# Patient Record
Sex: FEMALE | Race: WHITE | NOT HISPANIC OR LATINO | Employment: OTHER | URBAN - METROPOLITAN AREA
[De-identification: names, ages, dates, MRNs, and addresses within clinical notes are randomized per-mention and may not be internally consistent; named-entity substitution may affect disease eponyms.]

---

## 2022-11-07 ENCOUNTER — APPOINTMENT (EMERGENCY)
Dept: RADIOLOGY | Facility: HOSPITAL | Age: 82
End: 2022-11-07

## 2022-11-07 ENCOUNTER — APPOINTMENT (EMERGENCY)
Dept: CT IMAGING | Facility: HOSPITAL | Age: 82
End: 2022-11-07

## 2022-11-07 ENCOUNTER — HOSPITAL ENCOUNTER (INPATIENT)
Facility: HOSPITAL | Age: 82
LOS: 8 days | Discharge: HOME | End: 2022-11-16
Attending: EMERGENCY MEDICINE | Admitting: STUDENT IN AN ORGANIZED HEALTH CARE EDUCATION/TRAINING PROGRAM

## 2022-11-07 DIAGNOSIS — R77.8 ELEVATED TROPONIN: ICD-10-CM

## 2022-11-07 DIAGNOSIS — I50.9 CHF EXACERBATION (HCC): Primary | ICD-10-CM

## 2022-11-07 PROBLEM — R53.1 WEAKNESS: Status: ACTIVE | Noted: 2022-11-07

## 2022-11-07 PROBLEM — I48.91 ATRIAL FIBRILLATION (HCC): Status: ACTIVE | Noted: 2022-11-07

## 2022-11-07 PROBLEM — I10 HYPERTENSION: Status: ACTIVE | Noted: 2022-11-07

## 2022-11-07 PROBLEM — I48.0 PAROXYSMAL A-FIB (HCC): Status: ACTIVE | Noted: 2022-11-07

## 2022-11-07 PROBLEM — R79.89 ELEVATED TROPONIN: Status: ACTIVE | Noted: 2022-11-07

## 2022-11-07 PROBLEM — N18.9 CKD (CHRONIC KIDNEY DISEASE): Status: ACTIVE | Noted: 2022-11-07

## 2022-11-07 PROBLEM — I50.32 CHRONIC DIASTOLIC HEART FAILURE (HCC): Status: ACTIVE | Noted: 2022-11-07

## 2022-11-07 LAB
2HR DELTA HS TROPONIN: 69 NG/L
4HR DELTA HS TROPONIN: 106 NG/L
ALBUMIN SERPL BCP-MCNC: 4.1 G/DL (ref 3.5–5)
ALP SERPL-CCNC: 69 U/L (ref 46–116)
ALT SERPL W P-5'-P-CCNC: 21 U/L (ref 12–78)
ANION GAP SERPL CALCULATED.3IONS-SCNC: 11 MMOL/L (ref 4–13)
AST SERPL W P-5'-P-CCNC: 33 U/L (ref 5–45)
BASOPHILS # BLD AUTO: 0.06 THOUSANDS/ÂΜL (ref 0–0.1)
BASOPHILS NFR BLD AUTO: 1 % (ref 0–1)
BILIRUB SERPL-MCNC: 0.58 MG/DL (ref 0.2–1)
BUN SERPL-MCNC: 26 MG/DL (ref 5–25)
CALCIUM SERPL-MCNC: 9.7 MG/DL (ref 8.3–10.1)
CARDIAC TROPONIN I PNL SERPL HS: 132 NG/L
CARDIAC TROPONIN I PNL SERPL HS: 169 NG/L
CARDIAC TROPONIN I PNL SERPL HS: 63 NG/L
CHLORIDE SERPL-SCNC: 103 MMOL/L (ref 96–108)
CO2 SERPL-SCNC: 28 MMOL/L (ref 21–32)
CREAT SERPL-MCNC: 1.37 MG/DL (ref 0.6–1.3)
CRP SERPL QL: 3.1 MG/L
EOSINOPHIL # BLD AUTO: 0.08 THOUSAND/ÂΜL (ref 0–0.61)
EOSINOPHIL NFR BLD AUTO: 1 % (ref 0–6)
ERYTHROCYTE [DISTWIDTH] IN BLOOD BY AUTOMATED COUNT: 14.3 % (ref 11.6–15.1)
ERYTHROCYTE [SEDIMENTATION RATE] IN BLOOD: 30 MM/HOUR (ref 0–29)
FLUAV RNA RESP QL NAA+PROBE: NEGATIVE
FLUBV RNA RESP QL NAA+PROBE: NEGATIVE
GFR SERPL CREATININE-BSD FRML MDRD: 35 ML/MIN/1.73SQ M
GLUCOSE SERPL-MCNC: 129 MG/DL (ref 65–140)
HCT VFR BLD AUTO: 40.7 % (ref 34.8–46.1)
HGB BLD-MCNC: 13.3 G/DL (ref 11.5–15.4)
IMM GRANULOCYTES # BLD AUTO: 0.04 THOUSAND/UL (ref 0–0.2)
IMM GRANULOCYTES NFR BLD AUTO: 1 % (ref 0–2)
INR PPP: 2.32 (ref 0.84–1.19)
LYMPHOCYTES # BLD AUTO: 1.06 THOUSANDS/ÂΜL (ref 0.6–4.47)
LYMPHOCYTES NFR BLD AUTO: 13 % (ref 14–44)
MAGNESIUM SERPL-MCNC: 2.3 MG/DL (ref 1.6–2.6)
MCH RBC QN AUTO: 29.4 PG (ref 26.8–34.3)
MCHC RBC AUTO-ENTMCNC: 32.7 G/DL (ref 31.4–37.4)
MCV RBC AUTO: 90 FL (ref 82–98)
MONOCYTES # BLD AUTO: 0.46 THOUSAND/ÂΜL (ref 0.17–1.22)
MONOCYTES NFR BLD AUTO: 6 % (ref 4–12)
NEUTROPHILS # BLD AUTO: 6.54 THOUSANDS/ÂΜL (ref 1.85–7.62)
NEUTS SEG NFR BLD AUTO: 78 % (ref 43–75)
NRBC BLD AUTO-RTO: 0 /100 WBCS
NT-PROBNP SERPL-MCNC: 1040 PG/ML
PLATELET # BLD AUTO: 166 THOUSANDS/UL (ref 149–390)
PMV BLD AUTO: 10 FL (ref 8.9–12.7)
POTASSIUM SERPL-SCNC: 3.5 MMOL/L (ref 3.5–5.3)
PROCALCITONIN SERPL-MCNC: 0.06 NG/ML
PROT SERPL-MCNC: 7.8 G/DL (ref 6.4–8.4)
PROTHROMBIN TIME: 24.9 SECONDS (ref 11.6–14.5)
RBC # BLD AUTO: 4.52 MILLION/UL (ref 3.81–5.12)
RSV RNA RESP QL NAA+PROBE: NEGATIVE
SARS-COV-2 RNA RESP QL NAA+PROBE: NEGATIVE
SODIUM SERPL-SCNC: 142 MMOL/L (ref 135–147)
WBC # BLD AUTO: 8.24 THOUSAND/UL (ref 4.31–10.16)

## 2022-11-07 RX ORDER — METOPROLOL SUCCINATE 25 MG/1
25 TABLET, EXTENDED RELEASE ORAL DAILY
Status: DISCONTINUED | OUTPATIENT
Start: 2022-11-07 | End: 2022-11-16 | Stop reason: HOSPADM

## 2022-11-07 RX ORDER — ONDANSETRON 2 MG/ML
4 INJECTION INTRAMUSCULAR; INTRAVENOUS ONCE
Status: COMPLETED | OUTPATIENT
Start: 2022-11-07 | End: 2022-11-07

## 2022-11-07 RX ORDER — WARFARIN SODIUM 2 MG/1
2 TABLET ORAL
Status: DISCONTINUED | OUTPATIENT
Start: 2022-11-07 | End: 2022-11-08

## 2022-11-07 RX ORDER — ONDANSETRON 2 MG/ML
1 INJECTION INTRAMUSCULAR; INTRAVENOUS ONCE
Status: DISCONTINUED | OUTPATIENT
Start: 2022-11-07 | End: 2022-11-12

## 2022-11-07 RX ORDER — FUROSEMIDE 10 MG/ML
40 INJECTION INTRAMUSCULAR; INTRAVENOUS ONCE
Status: COMPLETED | OUTPATIENT
Start: 2022-11-07 | End: 2022-11-07

## 2022-11-07 RX ADMIN — FUROSEMIDE 40 MG: 10 INJECTION, SOLUTION INTRAMUSCULAR; INTRAVENOUS at 18:35

## 2022-11-07 RX ADMIN — METOPROLOL SUCCINATE 25 MG: 25 TABLET, EXTENDED RELEASE ORAL at 21:20

## 2022-11-07 RX ADMIN — IOHEXOL 100 ML: 350 INJECTION, SOLUTION INTRAVENOUS at 16:40

## 2022-11-07 RX ADMIN — ONDANSETRON 4 MG: 2 INJECTION INTRAMUSCULAR; INTRAVENOUS at 16:04

## 2022-11-07 RX ADMIN — WARFARIN SODIUM 2 MG: 2 TABLET ORAL at 22:29

## 2022-11-07 RX ADMIN — SODIUM CHLORIDE 500 ML: 0.9 INJECTION, SOLUTION INTRAVENOUS at 16:05

## 2022-11-07 NOTE — ED PROVIDER NOTES
History  Chief Complaint   Patient presents with   • Dizziness     PT arrived from home VIA EMS  C/o of dizziness, N/V, and weakness  A&O x4  Patient is an 27-year-old female past medical history of hypertension, CHF, hyperlipidemia, CAD, aortic valve replacement on Coumadin presenting for lightheadedness and vomiting  Patient states that 1:00 p m  Roughly 2-1/2 hours ago she began feeling lightheaded, weak and states that at that time she had a left-sided frontal headache which was intermittent and has now wrist fully resolved  She notes that she vomited 5-6 times, bilious, nonbloody and is still feeling nauseous but states that she is not currently feeling lightheaded  She notes sweats today but denies fevers, abdominal pain, chest pain, shortness breath, rashes, vision changes, numbness or tingling, weakness, dysuria  Denies any upper respiratory symptoms or cough  Received fluid on route with some improvement  Notes 2-3 episodes of nonbloody diarrhea and same timeframe  Did not take any medications for her symptoms  None       No past medical history on file  No past surgical history on file  No family history on file  I have reviewed and agree with the history as documented  No existing history information found  No existing history information found  Review of Systems   All other systems reviewed and are negative  Physical Exam  Physical Exam  Vitals reviewed  Constitutional:       General: She is not in acute distress  Appearance: Normal appearance  She is not ill-appearing  HENT:      Mouth/Throat:      Mouth: Mucous membranes are moist    Eyes:      Extraocular Movements: Extraocular movements intact  Conjunctiva/sclera: Conjunctivae normal       Pupils: Pupils are equal, round, and reactive to light  Cardiovascular:      Rate and Rhythm: Normal rate and regular rhythm  Heart sounds: Normal heart sounds     Pulmonary:      Effort: Pulmonary effort is normal       Breath sounds: Normal breath sounds  Abdominal:      General: Abdomen is flat  Palpations: Abdomen is soft  Tenderness: There is no abdominal tenderness  Musculoskeletal:         General: No swelling  Normal range of motion  Cervical back: Neck supple  Skin:     General: Skin is warm and dry  Neurological:      General: No focal deficit present  Mental Status: She is alert  Cranial Nerves: No cranial nerve deficit  Sensory: No sensory deficit  Motor: No weakness        Coordination: Coordination normal       Comments: Became lightheaded with standing and not able to ambulate   Psychiatric:         Mood and Affect: Mood normal          Vital Signs  ED Triage Vitals   Temp Pulse Respirations Blood Pressure SpO2   -- 11/07/22 1515 11/07/22 1515 11/07/22 1515 11/07/22 1515    80 16 165/87 96 %      Temp src Heart Rate Source Patient Position - Orthostatic VS BP Location FiO2 (%)   -- 11/07/22 1515 11/07/22 1515 11/07/22 1515 --    Monitor Sitting Right arm       Pain Score       11/07/22 1520       7           Vitals:    11/07/22 1515   BP: 165/87   Pulse: 80   Patient Position - Orthostatic VS: Sitting         Visual Acuity      ED Medications  Medications   ondansetron (FOR EMS ONLY) (ZOFRAN) 4 mg/2 mL injection 4 mg (has no administration in time range)   furosemide (LASIX) injection 40 mg (has no administration in time range)   sodium chloride 0 9 % bolus 500 mL (0 mL Intravenous Stopped 11/7/22 1705)   ondansetron (ZOFRAN) injection 4 mg (4 mg Intravenous Given 11/7/22 1604)   iohexol (OMNIPAQUE) 350 MG/ML injection (SINGLE-DOSE) 100 mL (100 mL Intravenous Given 11/7/22 1640)       Diagnostic Studies  Results Reviewed     Procedure Component Value Units Date/Time    HS Troponin 0hr (reflex protocol) [[id number]]  (Abnormal) Collected: 11/07/22 1600    Lab Status: Final result Specimen: Blood from Arm, Left Updated: 11/07/22 1638     hs TnI 0hr 63 ng/L HS Troponin I 2hr [[id number]]     Lab Status: No result Specimen: Blood     HS Troponin I 4hr [[id number]]     Lab Status: No result Specimen: Blood     Magnesium [[id number]]  (Normal) Collected: 11/07/22 1600    Lab Status: Final result Specimen: Blood from Arm, Left Updated: 11/07/22 1638     Magnesium 2 3 mg/dL     NT-BNP PRO [[id number]]  (Abnormal) Collected: 11/07/22 1600    Lab Status: Final result Specimen: Blood from Arm, Left Updated: 11/07/22 1638     NT-proBNP 1,040 pg/mL     Comprehensive metabolic panel [[id number]]  (Abnormal) Collected: 11/07/22 1600    Lab Status: Final result Specimen: Blood from Arm, Left Updated: 11/07/22 1630     Sodium 142 mmol/L      Potassium 3 5 mmol/L      Chloride 103 mmol/L      CO2 28 mmol/L      ANION GAP 11 mmol/L      BUN 26 mg/dL      Creatinine 1 37 mg/dL      Glucose 129 mg/dL      Calcium 9 7 mg/dL      AST 33 U/L      ALT 21 U/L      Alkaline Phosphatase 69 U/L      Total Protein 7 8 g/dL      Albumin 4 1 g/dL      Total Bilirubin 0 58 mg/dL      eGFR 35 ml/min/1 73sq m     Narrative:      [facility] guidelines for Chronic Kidney Disease (CKD):   •  Stage 1 with normal or high GFR (GFR > 90 mL/min/1 73 square meters)  •  Stage 2 Mild CKD (GFR = 60-89 mL/min/1 73 square meters)  •  Stage 3A Moderate CKD (GFR = 45-59 mL/min/1 73 square meters)  •  Stage 3B Moderate CKD (GFR = 30-44 mL/min/1 73 square meters)  •  Stage 4 Severe CKD (GFR = 15-29 mL/min/1 73 square meters)  •  Stage 5 End Stage CKD (GFR <15 mL/min/1 73 square meters)  Note: GFR calculation is accurate only with a steady state creatinine    CBC and differential [[id number]]  (Abnormal) Collected: 11/07/22 1600    Lab Status: Final result Specimen: Blood from Arm, Left Updated: 11/07/22 1605     WBC 8 24 Thousand/uL      RBC 4 52 Million/uL      Hemoglobin 13 3 g/dL      Hematocrit 40 7 %      MCV 90 fL      MCH 29 4 pg      MCHC 32 7 g/dL      RDW 14 3 %      MPV 10 0 fL Platelets 698 Thousands/uL      nRBC 0 /100 WBCs      Neutrophils Relative 78 %      Immat GRANS % 1 %      Lymphocytes Relative 13 %      Monocytes Relative 6 %      Eosinophils Relative 1 %      Basophils Relative 1 %      Neutrophils Absolute 6 54 Thousands/µL      Immature Grans Absolute 0 04 Thousand/uL      Lymphocytes Absolute 1 06 Thousands/µL      Monocytes Absolute 0 46 Thousand/µL      Eosinophils Absolute 0 08 Thousand/µL      Basophils Absolute 0 06 Thousands/µL                  XR chest 2 views   ED Interpretation by Gray Beckwith DO (11/07 1656)   NAD      CT abdomen pelvis with contrast   Final Result by Sean Roberts MD (11/07 1711)      1  Liver appears mildly heterogeneous and there is mild periportal edema, nonspecific findings that can be seen with passive congestion  Additionally the liver contour appears slightly lobulated and cannot exclude underlying fibrotic/cirrhotic changes  Suggest correlation with liver function parameters and nonemergent Ultrasound elastography if clinically warranted  2   Endometrium is thickened in this postmenopausal patient measuring approximately 12 mm  Recommend nonemergent pelvic ultrasound  3   Right lower lobe 4 mm nodule  Based on current Fleischner Society 2017 Guidelines on incidental pulmonary nodule, no routine follow-up is needed if the patient is considered low risk for lung cancer  If the patient is considered high risk for lung    cancer, 12 month follow-up non-contrast chest CT is recommended  4   Cardiomegaly with biatrial dilation  The study was marked in EPIC for significant notification  Workstation performed: [id number]         CT head without contrast   Final Result by Carlyle Nunn DO (11/07 1702)      No acute intracranial abnormality  Chronic microangiopathic changes                    Workstation performed: [id number]                    Procedures  ECG 12 Lead Documentation Only    Date/Time: 11/7/2022 5:19 PM  Performed by: Dionna Chu DO  Authorized by: Dionna Chu DO     ECG reviewed by me, the ED Provider: yes    Patient location:  ED  Previous ECG:     Previous ECG:  Unavailable  Interpretation:     Interpretation: abnormal    Rate:     ECG rate assessment: normal    Rhythm:     Rhythm: atrial fibrillation    QRS:     QRS axis:  Normal    QRS intervals:  Normal  Conduction:     Conduction: normal    ST segments:     ST segments:  Normal  T waves:     T waves: non-specific               ED Course  ED Course as of 11/07/22 1833   Mon Nov 07, 2022   1717  patient presents with signs of CHF exacerbation, will give Lasix and admit  SBIRT 20yo+    Flowsheet Row Most Recent Value   SBIRT (23 yo +)    In order to provide better care to our patients, we are screening all of our patients for alcohol and drug use  Would it be okay to ask you these screening questions? Yes Filed at: 11/07/2022 1522   Initial Alcohol Screen: US AUDIT-C     1  How often do you have a drink containing alcohol? 0 Filed at: 11/07/2022 1522   2  How many drinks containing alcohol do you have on a typical day you are drinking? 0 Filed at: 11/07/2022 1522   3b  FEMALE Any Age, or MALE 65+: How often do you have 4 or more drinks on one occassion? 0 Filed at: 11/07/2022 1522   Audit-C Score 0 Filed at: 11/07/2022 1522   MAYUR: How many times in the past year have you    Used an illegal drug or used a prescription medication for non-medical reasons? Never Filed at: 11/07/2022 1522                    MDM  Number of Diagnoses or Management Options  Diagnosis management comments: Patient is an 22-year-old female past medical history of hypertension, hyperlipidemia, CAD, CHF, aortic valve replacement on Coumadin presenting with lightheadedness, vomiting  Patient is well-appearing bedside stable vitals and in no acute distress    She has no abdominal tenderness, no gross abnormalities on neurologic exam with the exception of difficulty ambulating, states she becomes lightheaded weak with standing  Have no concern for posterior stroke as patient denies vertigo, has normal neurologic exam, has associated diarrhea and describes dizziness as lightheadedness and weakness  Will obtain CT head as she notes headache and vomiting, CT abdomen pelvis, cardiac workup give fluids and Zofran and reassess  Disposition  Final diagnoses:   CHF exacerbation ([address] )     Time reflects when diagnosis was documented in both MDM as applicable and the Disposition within this note     Time User Action Codes Description Comment    11/7/2022  5:20 PM Sergio Mckeon Add [I50 9] CHF exacerbation [facility])       ED Disposition     ED Disposition   Admit    Condition   Stable    Date/Time   Mon Nov 7, 2022  5:20 PM    Comment   Case was discussed with Dr Glenford Denver and the patient's admission status was agreed to be Admission Status: observation status to the service of Dr Glenford Denver   Follow-up Information    None         Patient's Medications    No medications on file       No discharge procedures on file      PDMP Review     None          ED Provider  Electronically Signed by           Rita Leblanc DO  11/07/22 7754

## 2022-11-08 LAB
2HR DELTA HS TROPONIN: 67 NG/L
4HR DELTA HS TROPONIN: 75 NG/L
ANION GAP SERPL CALCULATED.3IONS-SCNC: 9 MMOL/L (ref 4–13)
ATRIAL RATE: 100 BPM
BUN SERPL-MCNC: 24 MG/DL (ref 5–25)
CALCIUM SERPL-MCNC: 9.4 MG/DL (ref 8.3–10.1)
CARDIAC TROPONIN I PNL SERPL HS: 350 NG/L
CARDIAC TROPONIN I PNL SERPL HS: 417 NG/L
CARDIAC TROPONIN I PNL SERPL HS: 425 NG/L
CHLORIDE SERPL-SCNC: 101 MMOL/L (ref 96–108)
CO2 SERPL-SCNC: 32 MMOL/L (ref 21–32)
CREAT SERPL-MCNC: 1.6 MG/DL (ref 0.6–1.3)
ERYTHROCYTE [DISTWIDTH] IN BLOOD BY AUTOMATED COUNT: 14.3 % (ref 11.6–15.1)
GFR SERPL CREATININE-BSD FRML MDRD: 29 ML/MIN/1.73SQ M
GLUCOSE SERPL-MCNC: 100 MG/DL (ref 65–140)
HCT VFR BLD AUTO: 40 % (ref 34.8–46.1)
HGB BLD-MCNC: 13.4 G/DL (ref 11.5–15.4)
INR PPP: 2.4 (ref 0.84–1.19)
MAGNESIUM SERPL-MCNC: 2.3 MG/DL (ref 1.6–2.6)
MCH RBC QN AUTO: 29.4 PG (ref 26.8–34.3)
MCHC RBC AUTO-ENTMCNC: 33.5 G/DL (ref 31.4–37.4)
MCV RBC AUTO: 88 FL (ref 82–98)
PLATELET # BLD AUTO: 194 THOUSANDS/UL (ref 149–390)
PMV BLD AUTO: 10 FL (ref 8.9–12.7)
POTASSIUM SERPL-SCNC: 3.8 MMOL/L (ref 3.5–5.3)
PROTHROMBIN TIME: 25.6 SECONDS (ref 11.6–14.5)
QRS AXIS: 55 DEGREES
QRSD INTERVAL: 96 MS
QT INTERVAL: 352 MS
QTC INTERVAL: 442 MS
RBC # BLD AUTO: 4.56 MILLION/UL (ref 3.81–5.12)
SODIUM SERPL-SCNC: 142 MMOL/L (ref 135–147)
T WAVE AXIS: 37 DEGREES
VENTRICULAR RATE: 95 BPM
WBC # BLD AUTO: 7.95 THOUSAND/UL (ref 4.31–10.16)

## 2022-11-08 RX ORDER — ASPIRIN 81 MG/1
81 TABLET ORAL DAILY
Status: DISCONTINUED | OUTPATIENT
Start: 2022-11-08 | End: 2022-11-16 | Stop reason: HOSPADM

## 2022-11-08 RX ORDER — SODIUM CHLORIDE 9 MG/ML
75 INJECTION, SOLUTION INTRAVENOUS CONTINUOUS
Status: DISPENSED | OUTPATIENT
Start: 2022-11-08 | End: 2022-11-08

## 2022-11-08 RX ORDER — WARFARIN SODIUM 1 MG/1
1 TABLET ORAL DAILY
COMMUNITY

## 2022-11-08 RX ORDER — FUROSEMIDE 20 MG/1
20 TABLET ORAL 2 TIMES DAILY
COMMUNITY

## 2022-11-08 RX ORDER — METOPROLOL SUCCINATE 25 MG/1
25 TABLET, EXTENDED RELEASE ORAL DAILY
COMMUNITY

## 2022-11-08 RX ADMIN — ASPIRIN 81 MG: 81 TABLET, COATED ORAL at 15:38

## 2022-11-08 RX ADMIN — METOPROLOL SUCCINATE 25 MG: 25 TABLET, EXTENDED RELEASE ORAL at 08:11

## 2022-11-08 RX ADMIN — SODIUM CHLORIDE 75 ML/HR: 0.9 INJECTION, SOLUTION INTRAVENOUS at 15:38

## 2022-11-08 NOTE — ED NOTES
Repeat Trop elevated, dr Inga Thompson aware     Clide Mail, RN  11/08/22 1713 [facility], RN  11/08/22 1073

## 2022-11-08 NOTE — PROGRESS NOTES
[address]  Progress Note - Chel Tolliver 1940, 80 y o  female MRN: [id number]  Unit/Bed#: -01 Encounter: [id number]  Primary Care Provider: No primary care provider on file  Date and time admitted to hospital: 11/7/2022  2:54 PM    * Weakness  Assessment & Plan  Pt presented to ED after an episode of weakness with associated dizziness and lightheadedness around 1pm followed by 5 episodes nbnb vomiting and diarrhea  CT head: no acute intracranial abnormality  CT A/P: cardiomegaly with biatrial dilation  CXR: no evidence of vascular congestion   Cardiology following  Echo ordered, cardiac catheterization recommended but patient is declining at this time  · IV zofran    Elevated troponin  Assessment & Plan  Likely in setting of intractable nausea and vomiting, continue to trend  tnl 0 hr: 63  tnl 2 hr: 132  · Repeat at 4 hr  · Cardiology following, recommended echo and cardiac cath, patient is declining cardiac catheterization at this time    Chronic diastolic heart failure (HCC)  Assessment & Plan  Wt Readings from Last 3 Encounters:   11/08/22 68 kg (150 lb)     Last echo 1/22: EF>70%,   · Will repeat echo while inpatient  · Monitor I/Os, daily weights  · Monitor electrolytes  · Continue metoprolol, torsemide    Atrial fibrillation (HCC)  Assessment & Plan  · Continue rate control meds  · Coumadin being withheld due to potential cardiac catheterization tomorrow, will re-evaluate tomorrow  · With holding heparin due to increased INR and patient's age    Hypertension  Assessment & Plan  · Continue home BP meds    CKD (chronic kidney disease)  Assessment & Plan  Lab Results   Component Value Date    EGFR 29 11/08/2022    EGFR 35 11/07/2022    CREATININE 1 60 (H) 11/08/2022    CREATININE 1 37 (H) 11/07/2022     Cr at baseline, continue to monitor        VTE Pharmacologic Prophylaxis: VTE Score: 4 Moderate Risk (Score 3-4) - Pharmacological DVT Prophylaxis Contraindicated  Sequential Compression Devices Ordered  Patient Centered Rounds: I performed bedside rounds with nursing staff today  Discussions with Specialists or Other Care Team Provider:  Cardiology    Education and Discussions with Family / Patient: Updated  () at bedside  Time Spent for Care: 30 minutes  More than 50% of total time spent on counseling and coordination of care as described above  Current Length of Stay: 0 day(s)  Current Patient Status: Inpatient   Certification Statement: The patient will continue to require additional inpatient hospital stay due to Cardiology workup for weakness  Discharge Plan: Anticipate discharge in 24-48 hrs to home  Code Status: Level 1 - Full Code    Subjective:   Patient claims to be feeling well  She reports no nausea, vomiting, weakness, near syncopal episodes, lightheadedness, palpitations, chest pain/pressure  Objective:     Vitals:   Temp (24hrs), Av 2 °F (36 8 °C), Min:97 8 °F (36 6 °C), Max:98 5 °F (36 9 °C)    Temp:  [97 8 °F (36 6 °C)-98 5 °F (36 9 °C)] 98 5 °F (36 9 °C)  HR:  [94-99] 94  Resp:  [15-18] 16  BP: ()/(58-85) 129/79  SpO2:  [93 %-96 %] 94 %  Body mass index is 29 29 kg/m²  Input and Output Summary (last 24 hours): Intake/Output Summary (Last 24 hours) at 2022 1649  Last data filed at 2022 1538  Gross per 24 hour   Intake 1460 ml   Output --   Net 1460 ml       Physical Exam:   Physical Exam  Vitals and nursing note reviewed  Constitutional:       Appearance: Normal appearance  HENT:      Head: Normocephalic  Nose: Nose normal       Mouth/Throat:      Mouth: Mucous membranes are moist       Pharynx: Oropharynx is clear  Eyes:      General: No scleral icterus  Conjunctiva/sclera: Conjunctivae normal       Pupils: Pupils are equal, round, and reactive to light  Cardiovascular:      Rate and Rhythm: Normal rate  Rhythm irregular  Pulses: Normal pulses  Heart sounds:  No murmur heard  No friction rub  No gallop  Pulmonary:      Effort: Pulmonary effort is normal  No respiratory distress  Breath sounds: Normal breath sounds  No stridor  No wheezing or rales  Abdominal:      General: Abdomen is flat  Palpations: Abdomen is soft  Musculoskeletal:         General: Normal range of motion  Cervical back: Normal range of motion and neck supple  Right lower leg: No edema  Left lower leg: No edema  Skin:     General: Skin is warm  Findings: No bruising or lesion  Neurological:      General: No focal deficit present  Mental Status: She is alert and oriented to person, place, and time  Mental status is at baseline     Psychiatric:         Mood and Affect: Mood normal           Additional Data:     Labs:  Results from last 7 days   Lab Units 11/08/22  0705 11/07/22  1600   WBC Thousand/uL 7 95 8 24   HEMOGLOBIN g/dL 13 4 13 3   HEMATOCRIT % 40 0 40 7   PLATELETS Thousands/uL 194 166   NEUTROS PCT %  --  78*   LYMPHS PCT %  --  13*   MONOS PCT %  --  6   EOS PCT %  --  1     Results from last 7 days   Lab Units 11/08/22  0705 11/07/22  1600   SODIUM mmol/L 142 142   POTASSIUM mmol/L 3 8 3 5   CHLORIDE mmol/L 101 103   CO2 mmol/L 32 28   BUN mg/dL 24 26*   CREATININE mg/dL 1 60* 1 37*   ANION GAP mmol/L 9 11   CALCIUM mg/dL 9 4 9 7   ALBUMIN g/dL  --  4 1   TOTAL BILIRUBIN mg/dL  --  0 58   ALK PHOS U/L  --  69   ALT U/L  --  21   AST U/L  --  33   GLUCOSE RANDOM mg/dL 100 129     Results from last 7 days   Lab Units 11/08/22  0713   INR  2 40*             Results from last 7 days   Lab Units 11/07/22  2241   PROCALCITONIN ng/ml 0 06       Lines/Drains:  Invasive Devices  Report    Peripheral Intravenous Line  Duration           Peripheral IV 11/07/22 Left Antecubital 1 day                      Imaging: Reviewed radiology reports from this admission including: chest xray, abdominal/pelvic CT and CT head    Recent Cultures (last 7 days):         Last 24 Hours Medication List:   Current Facility-Administered Medications   Medication Dose Route Frequency Provider Last Rate   • aspirin  81 mg Oral Daily Sneha Melara MD     • metoprolol succinate  25 mg Oral Daily Li Hogan PA-C     • ondansetron (FOR EMS ONLY)  1 each Does not apply Once Triage Protocol Emergency, MD     • sodium chloride  75 mL/hr Intravenous Continuous Sneha Melara MD 75 mL/hr (11/08/22 5847)        Today, Patient Was Seen By: Jane Ramirez    **Please Note: This note may have been constructed using a voice recognition system  ** Trilobed Flap Text: The defect edges were debeveled with a #15 scalpel blade.  Given the location of the defect and the proximity to free margins a trilobed flap was deemed most appropriate.  Using a sterile surgical marker, an appropriate trilobed flap drawn around the defect.    The area thus outlined was incised deep to adipose tissue with a #15 scalpel blade.  The skin margins were undermined to an appropriate distance in all directions utilizing iris scissors.

## 2022-11-08 NOTE — ASSESSMENT & PLAN NOTE
Pt presented to ED after an episode of weakness with associated dizziness and lightheadedness around 1pm followed by 5 episodes nbnb vomiting and diarrhea  CT head: no acute intracranial abnormality  CT A/P: cardiomegaly with biatrial dilation  CXR: no evidence of vascular congestion   Cardiology following    Echo ordered, cardiac catheterization recommended but patient is declining at this time  · IV zofran

## 2022-11-08 NOTE — ASSESSMENT & PLAN NOTE
Lab Results   Component Value Date    EGFR 35 11/07/2022    CREATININE 1 37 (H) 11/07/2022     Cr at baseline, continue to monitor

## 2022-11-08 NOTE — ASSESSMENT & PLAN NOTE
Wt Readings from Last 3 Encounters:   No data found for Wt     Last echo 1/22: EF>70%,   · Monitor I/Os, daily weights  · Monitor electrolytes  · Continue metoprolol, torsemide

## 2022-11-08 NOTE — ASSESSMENT & PLAN NOTE
Lab Results   Component Value Date    EGFR 29 11/08/2022    EGFR 35 11/07/2022    CREATININE 1 60 (H) 11/08/2022    CREATININE 1 37 (H) 11/07/2022     Cr at baseline, continue to monitor

## 2022-11-08 NOTE — PLAN OF CARE
Problem: MOBILITY - ADULT  Goal: Maintain or return to baseline ADL function  Description: INTERVENTIONS:  -  Assess patient's ability to carry out ADLs; assess patient's baseline for ADL function and identify physical deficits which impact ability to perform ADLs (bathing, care of mouth/teeth, toileting, grooming, dressing, etc )  - Assess/evaluate cause of self-care deficits   - Assess range of motion  - Assess patient's mobility; develop plan if impaired  - Assess patient's need for assistive devices and provide as appropriate  - Encourage maximum independence but intervene and supervise when necessary  - Involve family in performance of ADLs  - Assess for home care needs following discharge   - Consider OT consult to assist with ADL evaluation and planning for discharge  - Provide patient education as appropriate  11/8/2022 1520 by Tsering Womack  Outcome: Progressing  11/8/2022 1516 by Tsering Woamck  Outcome: Progressing  Goal: Maintains/Returns to pre admission functional level  Description: INTERVENTIONS:  - Perform BMAT or MOVE assessment daily    - Set and communicate daily mobility goal to care team and patient/family/caregiver  - Collaborate with rehabilitation services on mobility goals if consulted  - Perform Range of Motion 3 times a day  - Reposition patient every 3 hours    - Dangle patient 3 times a day  - Stand patient 3 times a day  - Ambulate patient 3 times a day  - Out of bed to chair 3 times a day   - Out of bed for meals 3 times a day  - Out of bed for toileting  - Record patient progress and toleration of activity level   11/8/2022 1520 by Tsering Womack  Outcome: Progressing  11/8/2022 1516 by Tsering Womack  Outcome: Progressing     Problem: Potential for Falls  Goal: Patient will remain free of falls  Description: INTERVENTIONS:  - Educate patient/family on patient safety including physical limitations  - Instruct patient to call for assistance with activity   - Consult OT/PT to assist with strengthening/mobility   - Keep Call bell within reach  - Keep bed low and locked with side rails adjusted as appropriate  - Keep care items and personal belongings within reach  - Initiate and maintain comfort rounds  - Make Fall Risk Sign visible to staff  - Offer Toileting every 3 Hours, in advance of need  - Initiate/Maintain bed alarm  - Obtain necessary fall risk management equipment:  - Apply yellow socks and bracelet for high fall risk patients  - Consider moving patient to room near nurses station  11/8/2022 1520 by Roma Redmond  Outcome: Progressing  11/8/2022 1516 by Roma Redmond  Outcome: Progressing     Problem: PAIN - ADULT  Goal: Verbalizes/displays adequate comfort level or baseline comfort level  Description: Interventions:  - Encourage patient to monitor pain and request assistance  - Assess pain using appropriate pain scale  - Administer analgesics based on type and severity of pain and evaluate response  - Implement non-pharmacological measures as appropriate and evaluate response  - Consider cultural and social influences on pain and pain management  - Notify physician/advanced practitioner if interventions unsuccessful or patient reports new pain  Outcome: Progressing     Problem: INFECTION - ADULT  Goal: Absence or prevention of progression during hospitalization  Description: INTERVENTIONS:  - Assess and monitor for signs and symptoms of infection  - Monitor lab/diagnostic results  - Monitor all insertion sites, i e  indwelling lines, tubes, and drains  - Monitor endotracheal if appropriate and nasal secretions for changes in amount and color  - Lowgap appropriate cooling/warming therapies per order  - Administer medications as ordered  - Instruct and encourage patient and family to use good hand hygiene technique  - Identify and instruct in appropriate isolation precautions for identified infection/condition  Outcome: Progressing  Goal: Absence of fever/infection during neutropenic period  Description: INTERVENTIONS:  - Monitor WBC    Outcome: Progressing     Problem: SAFETY ADULT  Goal: Maintain or return to baseline ADL function  Description: INTERVENTIONS:  -  Assess patient's ability to carry out ADLs; assess patient's baseline for ADL function and identify physical deficits which impact ability to perform ADLs (bathing, care of mouth/teeth, toileting, grooming, dressing, etc )  - Assess/evaluate cause of self-care deficits   - Assess range of motion  - Assess patient's mobility; develop plan if impaired  - Assess patient's need for assistive devices and provide as appropriate  - Encourage maximum independence but intervene and supervise when necessary  - Involve family in performance of ADLs  - Assess for home care needs following discharge   - Consider OT consult to assist with ADL evaluation and planning for discharge  - Provide patient education as appropriate  11/8/2022 1520 by Jes Bell  Outcome: Progressing  11/8/2022 1516 by Jes Bell  Outcome: Progressing  Goal: Maintains/Returns to pre admission functional level  Description: INTERVENTIONS:  - Perform BMAT or MOVE assessment daily    - Set and communicate daily mobility goal to care team and patient/family/caregiver  - Collaborate with rehabilitation services on mobility goals if consulted  - Perform Range of Motion 3 times a day  - Reposition patient every 3 hours    - Dangle patient 3 times a day  - Stand patient 3 times a day  - Ambulate patient 3 times a day  - Out of bed to chair 3 times a day   - Out of bed for meals 3 times a day  - Out of bed for toileting  - Record patient progress and toleration of activity level   11/8/2022 1520 by Jes Bell  Outcome: Progressing  11/8/2022 1516 by Jes Bell  Outcome: Progressing  Goal: Patient will remain free of falls  Description: INTERVENTIONS:  - Educate patient/family on patient safety including physical limitations  - Instruct patient to call for assistance with activity   - Consult OT/PT to assist with strengthening/mobility   - Keep Call bell within reach  - Keep bed low and locked with side rails adjusted as appropriate  - Keep care items and personal belongings within reach  - Initiate and maintain comfort rounds  - Make Fall Risk Sign visible to staff  - Offer Toileting every 3 Hours, in advance of need  - Initiate/Maintain bed alarm  - Obtain necessary fall risk management equipment:   - Apply yellow socks and bracelet for high fall risk patients  - Consider moving patient to room near nurses station  11/8/2022 1520 by Kylee Poag  Outcome: Progressing  11/8/2022 1516 by Kylee Poag  Outcome: Progressing     Problem: DISCHARGE PLANNING  Goal: Discharge to home or other facility with appropriate resources  Description: INTERVENTIONS:  - Identify barriers to discharge w/patient and caregiver  - Arrange for needed discharge resources and transportation as appropriate  - Identify discharge learning needs (meds, wound care, etc )  - Arrange for interpretive services to assist at discharge as needed  - Refer to Case Management Department for coordinating discharge planning if the patient needs post-hospital services based on physician/advanced practitioner order or complex needs related to functional status, cognitive ability, or social support system  Outcome: Progressing     Problem: Knowledge Deficit  Goal: Patient/family/caregiver demonstrates understanding of disease process, treatment plan, medications, and discharge instructions  Description: Complete learning assessment and assess knowledge base    Interventions:  - Provide teaching at level of understanding  - Provide teaching via preferred learning methods  Outcome: Progressing

## 2022-11-08 NOTE — CONSULTS
Reason for Consult / Principal Problem:Elevated troponin    Physician Requesting Consult:  Ann-Marie Sims MD    Cardiologist: Vivi        Assessment and Plan      Current Problem List   Principal Problem:    Weakness  Active Problems:    Atrial fibrillation ([address] )    Hypertension    Chronic diastolic heart failure (HCC)    Elevated troponin    CKD (chronic kidney disease)    Assessment/Plan:    1  Elevated Troponin  · Trop 63 ->132 -> 169 ->350  · Past hx of CAD and cath in 2021 that per care everywhere was negative, but do not have access to full records  · BNP 1040  · Due to history, abnormal EKG findings, and increase in troponins-cardiac catheterization recommended   · Patient declined cardiac catheterization currently and states she would like to get a second opinion with her other cardiologist  Reviewed risks including MI and even mortality, patient aware  Will watch for next 24 hours and reassess   2  Atrial Fibrillation  · Chronic A fib on Coumadin  · EKG shows A fib  3  Aortic Valve Replacement  · Patient has history of TAVR (valve in valve) in 2021  · [facility]  · On coumadin        Subjective     CC: Shortness of Breath    HPI: Marlen [facility] 80y o  year old female PMH Afib, HTN, CHF, HLD, CAD, TAVR who presents with shortness of breath and dizziness  Patient states she suddenly felt dizzy and vomited multiple times  Patient was found to have elevated troponin  EKG: A fib, nonspecific ST and T wave abnormalities    ECHO: pending    Cardiac Catheterization: 11/2021 cath was negative    CHEST X-RAY: negative    Family History: non-contributory  Historical Information   History reviewed  No pertinent past medical history  History reviewed  No pertinent surgical history    Social History   Social History     Substance and Sexual Activity   Alcohol Use None     Social History     Substance and Sexual Activity   Drug Use Not on file     Social History     Tobacco Use   Smoking Status Never Smoker   Smokeless Tobacco Never Used     Family History: History reviewed  No pertinent family history  Review of Systems:  Review of Systems   Constitutional: Positive for fatigue  Respiratory: Positive for shortness of breath  Cardiovascular: Negative for chest pain, palpitations and leg swelling  Gastrointestinal: Negative for abdominal pain  Genitourinary: Negative for dysuria  Scheduled Meds:  Current Facility-Administered Medications   Medication Dose Route Frequency Provider Last Rate   • metoprolol succinate  25 mg Oral Daily Oralia Contreras PA-C     • ondansetron (FOR EMS ONLY)  1 each Does not apply Once Triage Protocol Emergency, MD     • warfarin  2 mg Oral Daily (warfarin) Lavelle Izaguirre MD       Continuous Infusions:   PRN Meds:   all current active meds have been reviewed    No Known Allergies    Objective   Vitals: Temp (24hrs), Av 9 °F (36 6 °C), Min:97 8 °F (36 6 °C), Max:97 9 °F (36 6 °C)  Current: Temperature: 97 8 °F (36 6 °C)  Patient Vitals for the past 24 hrs:   BP Temp Temp src Pulse Resp SpO2   22 0956 96/58 -- -- 99 16 95 %   22 0811 129/80 -- -- 97 18 94 %   22 0723 124/81 97 8 °F (36 6 °C) Oral 95 16 95 %   22 0559 133/84 -- -- 95 15 96 %   22 2345 131/78 97 9 °F (36 6 °C) Oral 95 18 93 %   22 2019 153/85 -- -- 95 16 96 %   22 1515 165/87 -- -- 80 16 96 %    There is no height or weight on file to calculate BMI  Orthostatic Blood Pressures    Flowsheet Row Most Recent Value   Blood Pressure 96/58 filed at 2022 2512   Patient Position - Orthostatic VS Sitting filed at 2022 0956              Invasive Devices  Report    Peripheral Intravenous Line  Duration           Peripheral IV 22 Left Antecubital <1 day                Physical Exam:  Physical Exam  Vitals and nursing note reviewed  Constitutional:       General: She is not in acute distress       Appearance: She is well-developed  HENT:      Head: Normocephalic and atraumatic  Eyes:      Conjunctiva/sclera: Conjunctivae normal    Cardiovascular:      Rate and Rhythm: Normal rate and regular rhythm  Heart sounds: No murmur heard  Pulmonary:      Effort: Pulmonary effort is normal  No respiratory distress  Breath sounds: Normal breath sounds  Abdominal:      Palpations: Abdomen is soft  Tenderness: There is no abdominal tenderness  Musculoskeletal:      Cervical back: Neck supple  Right lower leg: No edema  Left lower leg: No edema  Skin:     General: Skin is warm and dry  Neurological:      Mental Status: She is alert and oriented to person, place, and time               Lab Results:   Results from last 7 days   Lab Units 11/08/22  0705 11/07/22  1600   WBC Thousand/uL 7 95 8 24   HEMOGLOBIN g/dL 13 4 13 3   HEMATOCRIT % 40 0 40 7   PLATELETS Thousands/uL 194 166   NEUTROS PCT %  --  78*   MONOS PCT %  --  6      Results from last 7 days   Lab Units 11/08/22  0713 11/08/22 0705 11/07/22 2112 11/07/22  1600   SODIUM mmol/L  --  142  --  142   POTASSIUM mmol/L  --  3 8  --  3 5   CHLORIDE mmol/L  --  101  --  103   CO2 mmol/L  --  32  --  28   BUN mg/dL  --  24  --  26*   CREATININE mg/dL  --  1 60*  --  1 37*   CALCIUM mg/dL  --  9 4  --  9 7   ALK PHOS U/L  --   --   --  69   ALT U/L  --   --   --  21   AST U/L  --   --   --  33   SED RATE mm/hour  --   --  30*  --    CRP mg/L  --   --   --  3 1*   MAGNESIUM mg/dL  --  2 3  --  2 3   INR  2 40*  --  2 32*  --    EGFR ml/min/1 73sq m  --  29  --  35     Results from last 7 days   Lab Units 11/08/22  0713 11/07/22 2112   INR  2 40* 2 32*             No results found for: PHART, XWW3LGU, PO2ART, NPW3VGQ, W4FBETBA, BEART, SOURCE  No components found for: HIV1X2  No results found for: HAV, HEPAIGM, HEPBIGM, HEPBCAB, HBEAG, HEPCAB  No results found for: SPEP, UPEP No results found for: HGBA1C  No results found for: CHOL No results found for: HDL No results found for: LDLCALC No results found for: TRIG  No components found for: PROCAL          Imaging: I have personally reviewed pertinent reports          [facility],[address]  Internal Medicine Residency PGY-1

## 2022-11-08 NOTE — CASE MANAGEMENT
Case Management Assessment & Discharge Planning Note    Patient name Gayle Lares  Location [address] [id number] MRN [id number]  : 1940 Date 2022       Current Admission Date: 2022  Current Admission Diagnosis:Weakness   Patient Active Problem List    Diagnosis Date Noted   • Atrial fibrillation ([facility] [address] ) 2022   • Hypertension 2022   • Weakness 2022   • Chronic diastolic heart failure ([facility] [address] ) 2022   • Elevated troponin 2022   • CKD (chronic kidney disease) 2022      LOS (days): 0  Geometric Mean LOS (GMLOS) (days):   Days to GMLOS:     OBJECTIVE:              Current admission status: Observation       Preferred Pharmacy:   [facility] [address]  [place] [address]  Phone: [phone] Fax: [phone]    Primary Care Provider: No primary care provider on file  Primary Insurance: MEDICARE  Secondary Insurance: [address],[facility] E SHIELD    ASSESSMENT:  Active Health Care Proxies    There are no active Health Care Proxies on file  Advance Directives  Does patient have a [address]?: Yes (Patient stated that everything has been done with an )  Does patient have Advance Directives?: Yes  Advance Directives: Living will, Power of  for health care  Primary Contact:  светлана "LYSSA' Borek         Readmission Root Cause  30 Day Readmission: No    Patient Information  Admitted from[de-identified] Home  Mental Status: Alert  During Assessment patient was accompanied by: Spouse  Assessment information provided by[de-identified] Patient, Spouse  Primary Caregiver: Self  Support Systems: Self, Spouse/significant other, [address] of Residence: [place] [address] do you live in?: [address] [facility] entry access options   Select all that apply : No steps to enter home  Type of Current Residence: 2 story home  Upon entering residence, is there a bedroom on the main floor (no further steps)?: Yes (Patient stated there is no need to go upstairs to loft )  Upon entering residence, is there a bathroom on the main floor (no further steps)?: Yes  In the last 12 months, was there a time when you were not able to pay the mortgage or rent on time?: No  In the last 12 months, how many places have you lived?: 1  In the last 12 months, was there a time when you did not have a steady place to sleep or slept in a shelter (including now)?: No  Homeless/housing insecurity resource given?: N/A  Living Arrangements: Lives w/ Spouse/significant other  Is patient a ?: No    Activities of Daily Living Prior to Admission  Functional Status: Independent  Completes ADLs independently?: Yes  Ambulates independently?: Yes  Does patient use assisted devices?: Yes  Assisted Devices (DME) used: [employer]  Does patient currently own DME?: Yes  What DME does the patient currently own?: [employer]  Does patient have a history of Outpatient Therapy (PT/OT)?: No  Does the patient have a history of Short-Term Rehab?: No  Does patient have a history of HHC?: Yes (Patient stated a VNA came to home  Patient could not remember name of agency used   Patient also stated she did not want [address] )  Does patient currently have [address]?: No         Patient Information Continued  Income Source: Pension/alf  Does patient have prescription coverage?: Yes  Within the past 12 months, you worried that your food would run out before you got the money to buy more : Never true  Within the past 12 months, the food you bought just didn't last and you didn't have money to get more : Never true  Food insecurity resource given?: N/A  Does patient receive dialysis treatments?: No  Does patient have a history of substance abuse?: No  Does patient have a history of Mental Health Diagnosis?: No    PHQ 2/9 Screening   Reviewed PHQ 2/9 Depression Screening Score?: No    Means of Transportation  Means of Transport to Appts[de-identified] Drives Self  In the past 12 months, has lack of transportation kept you from medical appointments or from getting medications?: No  In the past 12 months, has lack of transportation kept you from meetings, work, or from getting things needed for daily living?: No  Was application for public transport provided?: N/A        DISCHARGE DETAILS:    Discharge planning discussed with[de-identified] Patient discussed discharge with patient at bedside with   Freedom of Choice: Yes  Comments - Freedom of Choice: CM discussed freedom of choice with patient at bedside along with  as it pertains to discharge planning  CM contacted family/caregiver?: Yes ( was at bedside )  Were Treatment Team discharge recommendations reviewed with patient/caregiver?: Yes  Did patient/caregiver verbalize understanding of patient care needs?: Yes  Were patient/caregiver advised of the risks associated with not following Treatment Team discharge recommendations?: Yes         Requested [address]         Is the patient interested in [place] [address] at discharge?: No    DME Referral Provided  Referral made for DME?: No    Other Referral/Resources/Interventions Provided:  Interventions: None Indicated  Referral Comments: Patient declined needing any services  Patient stated they plan on leaving for a long vacation in january so they did not want VNA services      Would you like to participate in our [address] service program?  : No - Declined

## 2022-11-08 NOTE — ASSESSMENT & PLAN NOTE
Likely in setting of intractable nausea and vomiting, continue to trend  tnl 0 hr: 63  tnl 2 hr: 132  · Repeat at 4 hr

## 2022-11-08 NOTE — ASSESSMENT & PLAN NOTE
· Continue rate control meds  · Coumadin being withheld due to potential cardiac catheterization tomorrow, will re-evaluate tomorrow  · With holding heparin due to increased INR and patient's age

## 2022-11-08 NOTE — ASSESSMENT & PLAN NOTE
Pt presented to ED after an episode of weakness with associated dizziness and lightheadedness around 1pm followed by 5 episodes nbnb vomiting and diarrhea  CT head: no acute intracranial abnormality  CT A/P: cardiomegaly with biatrial dilation  CXR: no evidence of vascular congestion  · ESR, CRP, covid test pending  · IV zofran

## 2022-11-08 NOTE — H&P
[address] [facility] 1940, 80 y o  female MRN: [id number]  Unit/Bed#: IKS86 Encounter: [id number]  Primary Care Provider: No primary care provider on file  Date and time admitted to hospital: 11/7/2022  2:54 PM    * Weakness  Assessment & Plan  Pt presented to ED after an episode of weakness with associated dizziness and lightheadedness around 1pm followed by 5 episodes nbnb vomiting and diarrhea  CT head: no acute intracranial abnormality  CT A/P: cardiomegaly with biatrial dilation  CXR: no evidence of vascular congestion  · ESR, CRP, covid test pending  · IV zofran    Elevated troponin  Assessment & Plan  Likely in setting of intractable nausea and vomiting, continue to trend  tnl 0 hr: 63  tnl 2 hr: 132  · Repeat at 4 hr    Chronic diastolic heart failure (HCC)  Assessment & Plan  Wt Readings from Last 3 Encounters:   No data found for Wt     Last echo 1/22: EF>70%,   · Monitor I/Os, daily weights  · Monitor electrolytes  · Continue metoprolol, torsemide    Atrial fibrillation (HCC)  Assessment & Plan  · Continue rate control meds  · Continue warfarin    CKD (chronic kidney disease)  Assessment & Plan  Lab Results   Component Value Date    EGFR 35 11/07/2022    CREATININE 1 37 (H) 11/07/2022     Cr at baseline, continue to monitor    Hypertension  Assessment & Plan  · Continue home BP meds    VTE Pharmacologic Prophylaxis:   Moderate Risk (Score 3-4) - Pharmacological DVT Prophylaxis Ordered: warfarin (Coumadin)  Code Status: Level 1 - Full Code   Discussion with family: Updated  () at bedside  Anticipated Length of Stay: Patient will be admitted on an observation basis with an anticipated length of stay of less than 2 midnights secondary to lightheadedness, vomiting      Total Time for Visit, including Counseling / Coordination of Care: 60 minutes Greater than 50% of this total time spent on direct patient counseling and coordination of care     Chief Complaint: lightheadedness, vomiting    History of Present Illness:  Sage Bashir is a 80 y o  female with a PMH of hypertension, chronic diastolic heart failure, hyperlipidemia, aortic stenosis s/p AVR, afib on warfarin who presents with lightheadedness and vomiting  Around 1:00pm this afternoon, patient reports she became weak with associated dizziness and lightheadedness  She also had a headache that has since resolved  She also reports about 5 episodes of NBNB emesis with nausea, that has also resolved and about 3 episodes of diarrhea  She denies fever, chills, upper respiratory symptoms, chest pain, shortness of breath, abdominal pain, paresthesias, or vision changes  Review of Systems:  Review of Systems   Constitutional: Negative for chills, diaphoresis and fever  HENT: Negative for ear pain and sore throat  Eyes: Negative for pain and visual disturbance  Respiratory: Negative for cough and shortness of breath  Cardiovascular: Negative for chest pain, palpitations and leg swelling  Gastrointestinal: Negative for abdominal pain and vomiting  Genitourinary: Negative for dysuria and hematuria  Musculoskeletal: Negative for arthralgias and back pain  Skin: Negative for color change and rash  Neurological: Negative for seizures and syncope  All other systems reviewed and are negative  Past Medical and Surgical History:   No past medical history on file  No past surgical history on file  Meds/Allergies:  Prior to Admission medications    Not on File     I have reviewed home medications with patient personally      Allergies: No Known Allergies    Social History:  Marital Status: /Civil Union   Patient Pre-hospital Living Situation: Home  Patient Pre-hospital Level of Mobility: walks  Patient Pre-hospital Diet Restrictions: none  Substance Use History:   Social History     Substance and Sexual Activity   Alcohol Use Not on file     Social History Tobacco Use   Smoking Status Not on file   Smokeless Tobacco Not on file     Social History     Substance and Sexual Activity   Drug Use Not on file       Family History:  No family history on file  Physical Exam:     Vitals:   Blood Pressure: 153/85 (11/07/22 2019)  Pulse: 95 (11/07/22 2019)  Respirations: 16 (11/07/22 2019)  SpO2: 96 % (11/07/22 2019)    Physical Exam  Vitals and nursing note reviewed  Constitutional:       General: She is not in acute distress  Appearance: Normal appearance  She is well-developed  She is not ill-appearing or diaphoretic  HENT:      Head: Normocephalic and atraumatic  Eyes:      Conjunctiva/sclera: Conjunctivae normal    Cardiovascular:      Rate and Rhythm: Normal rate  Rhythm irregular  Heart sounds: Normal heart sounds  Pulmonary:      Effort: Pulmonary effort is normal  No respiratory distress  Breath sounds: Normal breath sounds  No rales  Abdominal:      General: Bowel sounds are normal       Palpations: Abdomen is soft  Tenderness: There is no abdominal tenderness  Musculoskeletal:      Cervical back: Normal range of motion  Right lower leg: No edema  Left lower leg: No edema  Skin:     General: Skin is warm and dry  Neurological:      General: No focal deficit present  Mental Status: She is alert and oriented to person, place, and time     Psychiatric:         Mood and Affect: Mood normal          Behavior: Behavior normal          Additional Data:     Lab Results:  Results from last 7 days   Lab Units 11/07/22  1600   WBC Thousand/uL 8 24   HEMOGLOBIN g/dL 13 3   HEMATOCRIT % 40 7   PLATELETS Thousands/uL 166   NEUTROS PCT % 78*   LYMPHS PCT % 13*   MONOS PCT % 6   EOS PCT % 1     Results from last 7 days   Lab Units 11/07/22  1600   SODIUM mmol/L 142   POTASSIUM mmol/L 3 5   CHLORIDE mmol/L 103   CO2 mmol/L 28   BUN mg/dL 26*   CREATININE mg/dL 1 37*   ANION GAP mmol/L 11   CALCIUM mg/dL 9 7   ALBUMIN g/dL 4 1 TOTAL BILIRUBIN mg/dL 0 58   ALK PHOS U/L 69   ALT U/L 21   AST U/L 33   GLUCOSE RANDOM mg/dL 129                       Imaging: Reviewed radiology reports from this admission including: CT A/P, CT head, CXR  XR chest 2 views   ED Interpretation by Paul Ash DO (11/07 1656)   NAD      CT abdomen pelvis with contrast   Final Result by Ralph Sibley MD (11/07 1711)      1  Liver appears mildly heterogeneous and there is mild periportal edema, nonspecific findings that can be seen with passive congestion  Additionally the liver contour appears slightly lobulated and cannot exclude underlying fibrotic/cirrhotic changes  Suggest correlation with liver function parameters and nonemergent Ultrasound elastography if clinically warranted  2   Endometrium is thickened in this postmenopausal patient measuring approximately 12 mm  Recommend nonemergent pelvic ultrasound  3   Right lower lobe 4 mm nodule  Based on current Fleischner Society 2017 Guidelines on incidental pulmonary nodule, no routine follow-up is needed if the patient is considered low risk for lung cancer  If the patient is considered high risk for lung    cancer, 12 month follow-up non-contrast chest CT is recommended  4   Cardiomegaly with biatrial dilation  The study was marked in EPIC for significant notification  Workstation performed: [id number]         CT head without contrast   Final Result by Army Sicard, DO (11/07 1702)      No acute intracranial abnormality  Chronic microangiopathic changes  Workstation performed: [id number]             EKG and Other Studies Reviewed on Admission:   · EKG: Atrial fibrillation  HR 95      ** Please Note: This note has been constructed using a voice recognition system   **

## 2022-11-08 NOTE — ASSESSMENT & PLAN NOTE
Wt Readings from Last 3 Encounters:   11/08/22 68 kg (150 lb)     Last echo 1/22: EF>70%,   · Will repeat echo while inpatient  · Monitor I/Os, daily weights  · Monitor electrolytes  · Continue metoprolol, torsemide

## 2022-11-09 ENCOUNTER — APPOINTMENT (INPATIENT)
Dept: NON INVASIVE DIAGNOSTICS | Facility: HOSPITAL | Age: 82
End: 2022-11-09

## 2022-11-09 LAB
ANION GAP SERPL CALCULATED.3IONS-SCNC: 7 MMOL/L (ref 4–13)
AORTIC ROOT: 3.1 CM
AORTIC VALVE MEAN VELOCITY: 9.9 M/S
APICAL FOUR CHAMBER EJECTION FRACTION: 66 %
ASCENDING AORTA: 3.6 CM
AV AREA BY CONTINUOUS VTI: 1.4 CM2
AV AREA PEAK VELOCITY: 1.4 CM2
AV LVOT MEAN GRADIENT: 1 MMHG
AV LVOT PEAK GRADIENT: 3 MMHG
AV MEAN GRADIENT: 5 MMHG
AV PEAK GRADIENT: 9 MMHG
AV VALVE AREA: 1.39 CM2
BUN SERPL-MCNC: 26 MG/DL (ref 5–25)
CALCIUM SERPL-MCNC: 9 MG/DL (ref 8.3–10.1)
CARDIAC TROPONIN I PNL SERPL HS: 234 NG/L (ref 8–18)
CHLORIDE SERPL-SCNC: 103 MMOL/L (ref 96–108)
CO2 SERPL-SCNC: 29 MMOL/L (ref 21–32)
CREAT SERPL-MCNC: 1.5 MG/DL (ref 0.6–1.3)
DOP CALC AO VTI: 31.8 CM
DOP CALC LVOT AREA: 2.54
DOP CALC LVOT DIAMETER: 1.8 CM
DOP CALC LVOT PEAK VEL VTI: 17.4 CM
DOP CALC LVOT PEAK VEL: 0.84 M/S
DOP CALC LVOT STROKE INDEX: 26.7 ML/M2
DOP CALC LVOT STROKE VOLUME: 44.26
ERYTHROCYTE [DISTWIDTH] IN BLOOD BY AUTOMATED COUNT: 14.1 % (ref 11.6–15.1)
FRACTIONAL SHORTENING: 36 (ref 28–44)
GFR SERPL CREATININE-BSD FRML MDRD: 32 ML/MIN/1.73SQ M
GLUCOSE SERPL-MCNC: 96 MG/DL (ref 65–140)
HCT VFR BLD AUTO: 39.3 % (ref 34.8–46.1)
HGB BLD-MCNC: 12.8 G/DL (ref 11.5–15.4)
INR PPP: 2.72 (ref 0.84–1.19)
INTERVENTRICULAR SEPTUM IN DIASTOLE (PARASTERNAL SHORT AXIS VIEW): 1 CM
INTERVENTRICULAR SEPTUM: 1 CM (ref 0.6–1.1)
LA/AORTA RATIO 2D: 1.45
LAAS-AP2: 28.9 CM2
LAAS-AP4: 25.3 CM2
LEFT ATRIUM SIZE: 4.5 CM
LEFT INTERNAL DIMENSION IN SYSTOLE: 2.7 CM (ref 2.1–4)
LEFT VENTRICULAR INTERNAL DIMENSION IN DIASTOLE: 4.2 CM (ref 3.5–6)
LEFT VENTRICULAR POSTERIOR WALL IN END DIASTOLE: 1.2 CM
LEFT VENTRICULAR STROKE VOLUME: 50 ML
LVSV (TEICH): 50 ML
MCH RBC QN AUTO: 28.9 PG (ref 26.8–34.3)
MCHC RBC AUTO-ENTMCNC: 32.6 G/DL (ref 31.4–37.4)
MCV RBC AUTO: 89 FL (ref 82–98)
PA SYSTOLIC PRESSURE: 50 MMHG
PLATELET # BLD AUTO: 171 THOUSANDS/UL (ref 149–390)
PMV BLD AUTO: 9.8 FL (ref 8.9–12.7)
POTASSIUM SERPL-SCNC: 3.9 MMOL/L (ref 3.5–5.3)
PROTHROMBIN TIME: 28.3 SECONDS (ref 11.6–14.5)
RBC # BLD AUTO: 4.43 MILLION/UL (ref 3.81–5.12)
SL CV LV EF: 55
SL CV PED ECHO LEFT VENTRICLE DIASTOLIC VOLUME (MOD BIPLANE) 2D: 77 ML
SL CV PED ECHO LEFT VENTRICLE SYSTOLIC VOLUME (MOD BIPLANE) 2D: 27 ML
SODIUM SERPL-SCNC: 139 MMOL/L (ref 135–147)
TR MAX PG: 37 MMHG
TR PEAK VELOCITY: 3.1 M/S
TRICUSPID ANNULAR PLANE SYSTOLIC EXCURSION: 2.2 CM
TRICUSPID VALVE PEAK REGURGITATION VELOCITY: 3.06 M/S
WBC # BLD AUTO: 5.84 THOUSAND/UL (ref 4.31–10.16)

## 2022-11-09 RX ADMIN — METOPROLOL SUCCINATE 25 MG: 25 TABLET, EXTENDED RELEASE ORAL at 08:46

## 2022-11-09 RX ADMIN — ASPIRIN 81 MG: 81 TABLET, COATED ORAL at 08:46

## 2022-11-09 NOTE — ASSESSMENT & PLAN NOTE
Likely in setting of intractable nausea and vomiting, continue to trend  tnl 0 hr: 63  tnl 2 hr: 132  · Repeat at 4 hr  · Cardiology following, read recommendations under weakness

## 2022-11-09 NOTE — PROGRESS NOTES
[facility]  Progress Note - Jason Garcia 1940, 80 y o  female MRN: [id number]  Unit/Bed#: -01 Encounter: [id number]  Primary Care Provider: No primary care provider on file  Date and time admitted to hospital: 11/7/2022  2:54 PM    * Weakness  Assessment & Plan  Pt presented to ED after an episode of weakness with associated dizziness and lightheadedness around 1pm followed by 5 episodes nbnb vomiting and diarrhea  CT head: no acute intracranial abnormality  CT A/P: cardiomegaly with biatrial dilation  CXR: no evidence of vascular congestion   Cardiology following  Awaiting for echo to be read by cardiologist, patient now agreeable to cardiac catheterization  Will need INR to decrease prior to cardiac catheterization  · IV zofran    Elevated troponin  Assessment & Plan  Likely in setting of intractable nausea and vomiting, continue to trend  tnl 0 hr: 63  tnl 2 hr: 132  · Repeat at 4 hr  · Cardiology following, read recommendations under weakness    Chronic diastolic heart failure ([address] )  Assessment & Plan  Wt Readings from Last 3 Encounters:   11/09/22 68 kg (150 lb)     Last echo 1/22: EF>70%,   · Will repeat echo while inpatient    Awaiting echo to be read by cardiologist  · Monitor I/Os, daily weights  · Monitor electrolytes  · Continue metoprolol, torsemide    Atrial fibrillation (HCC)  Assessment & Plan  · Continue rate control meds  · Coumadin being withheld due to potential cardiac catheterization tomorrow, continue to hold due to cardiac catheterization  · Will start heparin    Hypertension  Assessment & Plan  · Continue home BP meds    CKD (chronic kidney disease)  Assessment & Plan  Lab Results   Component Value Date    EGFR 32 11/09/2022    EGFR 29 11/08/2022    EGFR 35 11/07/2022    CREATININE 1 50 (H) 11/09/2022    CREATININE 1 60 (H) 11/08/2022    CREATININE 1 37 (H) 11/07/2022     Cr at baseline, continue to monitor        VTE Pharmacologic Prophylaxis: VTE Score: 4 Moderate Risk (Score 3-4) - Pharmacological DVT Prophylaxis Contraindicated  Sequential Compression Devices Ordered  Patient Centered Rounds: I performed bedside rounds with nursing staff today  Discussions with Specialists or Other Care Team Provider:  Cardiology    Education and Discussions with Family / Patient: Updated  () at bedside  Time Spent for Care: 30 minutes  More than 50% of total time spent on counseling and coordination of care as described above  Current Length of Stay: 1 day(s)  Current Patient Status: Inpatient   Certification Statement: The patient will continue to require additional inpatient hospital stay due to Cardiac catheterization  Discharge Plan: Anticipate discharge in 48 hrs to home  Code Status: Level 1 - Full Code    Subjective:   Patient is feeling well today  Initially was declining a cardiac catheterization, however talk to her outpatient cardiologist who recommended she follow the advice of the inpatient cardiology team to get a cardiac catheterization  She is now agreeable to cardiac catheterization  She denies shortness of breath, nausea, vomiting, lightheadedness, presyncopal events, chest pain/pressure, or palpitations  Objective:     Vitals:   Temp (24hrs), Av 4 °F (36 9 °C), Min:98 2 °F (36 8 °C), Max:98 5 °F (36 9 °C)    Temp:  [98 2 °F (36 8 °C)-98 5 °F (36 9 °C)] 98 2 °F (36 8 °C)  HR:  [85-94] 91  Resp:  [16] 16  BP: (105-154)/(57-95) 154/95  SpO2:  [92 %-94 %] 92 %  Body mass index is 29 29 kg/m²  Input and Output Summary (last 24 hours): Intake/Output Summary (Last 24 hours) at 2022 1305  Last data filed at 2022 0851  Gross per 24 hour   Intake 1920 ml   Output 0 ml   Net 1920 ml       Physical Exam:   Physical Exam  Vitals and nursing note reviewed  Constitutional:       Appearance: Normal appearance  HENT:      Head: Normocephalic and atraumatic        Right Ear: External ear normal       Nose: Nose normal       Mouth/Throat:      Mouth: Mucous membranes are moist       Pharynx: Oropharynx is clear  Eyes:      General: No scleral icterus  Conjunctiva/sclera: Conjunctivae normal       Pupils: Pupils are equal, round, and reactive to light  Cardiovascular:      Rate and Rhythm: Normal rate  Rhythm irregular  Pulses: Normal pulses  Heart sounds: No murmur heard  No friction rub  No gallop  Pulmonary:      Effort: Pulmonary effort is normal  No respiratory distress  Breath sounds: No stridor  No wheezing, rhonchi or rales  Musculoskeletal:         General: Normal range of motion  Cervical back: Normal range of motion and neck supple  Right lower leg: No edema  Left lower leg: No edema  Skin:     General: Skin is warm  Coloration: Skin is not jaundiced or pale  Findings: No bruising, erythema or lesion  Neurological:      General: No focal deficit present  Mental Status: She is alert and oriented to person, place, and time  Mental status is at baseline  Psychiatric:         Mood and Affect: Mood normal           Additional Data:     Labs:  Results from last 7 days   Lab Units 11/09/22  0708 11/08/22  0705 11/07/22  1600   WBC Thousand/uL 5 84   < > 8 24   HEMOGLOBIN g/dL 12 8   < > 13 3   HEMATOCRIT % 39 3   < > 40 7   PLATELETS Thousands/uL 171   < > 166   NEUTROS PCT %  --   --  78*   LYMPHS PCT %  --   --  13*   MONOS PCT %  --   --  6   EOS PCT %  --   --  1    < > = values in this interval not displayed       Results from last 7 days   Lab Units 11/09/22  0708 11/08/22  0705 11/07/22  1600   SODIUM mmol/L 139   < > 142   POTASSIUM mmol/L 3 9   < > 3 5   CHLORIDE mmol/L 103   < > 103   CO2 mmol/L 29   < > 28   BUN mg/dL 26*   < > 26*   CREATININE mg/dL 1 50*   < > 1 37*   ANION GAP mmol/L 7   < > 11   CALCIUM mg/dL 9 0   < > 9 7   ALBUMIN g/dL  --   --  4 1   TOTAL BILIRUBIN mg/dL  --   --  0 58   ALK PHOS U/L  --   --  69   ALT U/L  --   -- 21   AST U/L  --   --  33   GLUCOSE RANDOM mg/dL 96   < > 129    < > = values in this interval not displayed  Results from last 7 days   Lab Units 11/09/22  0708   INR  2 72*             Results from last 7 days   Lab Units 11/07/22  2241   PROCALCITONIN ng/ml 0 06       Lines/Drains:  Invasive Devices  Report    Peripheral Intravenous Line  Duration           Peripheral IV 11/07/22 Left Antecubital 1 day                  Telemetry:  Telemetry Orders (From admission, onward)             48 Hour Telemetry Monitoring  Continuous x 48 hours        References:    Telemetry Guidelines   Question:  Reason for 48 Hour Telemetry  Answer:  Arrhythmias Requiring Medical Therapy (eg  SVT, Vtach/fib, Bradycardia, Uncontrolled A-fib)                 Telemetry Reviewed: Not currently placed  Indication for Continued Telemetry Use: Arrthymias requiring medical therapy             Imaging: Reviewed radiology reports from this admission including: chest xray, abdominal/pelvic CT and CT head    Recent Cultures (last 7 days):         Last 24 Hours Medication List:   Current Facility-Administered Medications   Medication Dose Route Frequency Provider Last Rate   • aspirin  81 mg Oral Daily Apoorva Merino MD     • metoprolol succinate  25 mg Oral Daily Oralia Contreras PA-C     • ondansetron (FOR EMS ONLY)  1 each Does not apply Once Gray Beckwith,           Today, Patient Was Seen By: Sergio Ramirez    **Please Note: This note may have been constructed using a voice recognition system  **

## 2022-11-09 NOTE — PROGRESS NOTES
Cardiology Progress Note - Ranjan Duke 80 y o  female MRN: [id number]    Unit/Bed#: -01 Encounter: [id number]      Assessment/Plan:  1  Elevated troponins, peak troponin 425, will check 1 more troponin  Patient was symptoms suspicious for angina  Echocardiogram ordered and pending  Discussed need for cardiac catheterization to assess for CAD  Risk and benefits reviewed  Patient was initially hesitant but has now agreed  Patient was on Coumadin and INR is currently 2 7, cardiac catheterization can be done when INR is less than or equal to 1 8     2  Chronic atrial fibrillation, heart rate stable in the 90s  Continue with aspirin, Toprol  3  Aortic stenosis status post TAVR, new echocardiogram ordered and pending  Continue aspirin, metoprolol    4  Hypertension, stable 125/71  Continue with Toprol    5  STEWART, Cr 1 5, continue to monitor  Pt will need renal optimization prior to cardiac cath  Subjective:   Patient seen and examined  No significant events overnight  Im feeling good  Denies chest pain    Objective:     Vitals: Blood pressure 154/95, pulse 91, temperature 98 2 °F (36 8 °C), temperature source Oral, resp  rate 16, height 5' (1 524 m), weight 68 kg (150 lb), SpO2 92 %  , Body mass index is 29 29 kg/m² ,   Orthostatic Blood Pressures    Flowsheet Row Most Recent Value   Blood Pressure 154/95 filed at 11/09/2022 0900   Patient Position - Orthostatic VS Sitting filed at 11/09/2022 0700            Intake/Output Summary (Last 24 hours) at 11/9/2022 1008  Last data filed at 11/9/2022 0851  Gross per 24 hour   Intake 1920 ml   Output 0 ml   Net 1920 ml         Physical Exam:    GEN: Ranjan Duke appears well, alert and oriented x 3, pleasant and cooperative   HEENT: pupils equal, round, and reactive to light; extraocular muscles intact  NECK: supple, no carotid bruits   HEART: Irregularly irregular, normal S1 and S2, no murmurs, clicks, gallops or rubs   LUNGS: clear to auscultation bilaterally; no wheezes, rales, or rhonchi   ABDOMEN: normal bowel sounds, soft, no tenderness, no distention  EXTREMITIES: peripheral pulses normal; no clubbing, cyanosis, or edema      Medications:      Current Facility-Administered Medications:   •  aspirin (ECOTRIN LOW STRENGTH) EC tablet 81 mg, 81 mg, Oral, Daily, Oswald Kussmaul, MD, 81 mg at 11/09/22 0846  •  metoprolol succinate (TOPROL-XL) 24 hr tablet 25 mg, 25 mg, Oral, Daily, Trevor Senior PA-C, 25 mg at 11/09/22 0846  •  ondansetron (FOR EMS ONLY) (ZOFRAN) 4 mg/2 mL injection 4 mg, 1 each, Does not apply, Once, Luisa Darby,      Labs & Results:    Results from last 7 days   Lab Units 11/08/22  1350 11/08/22  1151 11/08/22  0909 11/07/22  2112 11/07/22  1833 11/07/22  1600   HS TNI 0HR ng/L  --   --  350*  --   --  63*   HS TNI 2HR ng/L  --  417*  --   --  132*  --    HS TNI 4HR ng/L 425*  --   --  169*  --   --    HSTNI D4 ng/L 75*  --   --  106*  --   --    NT-PRO BNP pg/mL  --   --   --   --   --  1,040*     Results from last 7 days   Lab Units 11/09/22  0708 11/08/22  0705 11/07/22  1600   WBC Thousand/uL 5 84 7 95 8 24   HEMOGLOBIN g/dL 12 8 13 4 13 3   HEMATOCRIT % 39 3 40 0 40 7   PLATELETS Thousands/uL 171 194 166         Results from last 7 days   Lab Units 11/09/22  0708 11/08/22  0705 11/07/22  1600   POTASSIUM mmol/L 3 9 3 8 3 5   CHLORIDE mmol/L 103 101 103   CO2 mmol/L 29 32 28   BUN mg/dL 26* 24 26*   CREATININE mg/dL 1 50* 1 60* 1 37*   CALCIUM mg/dL 9 0 9 4 9 7   ALK PHOS U/L  --   --  69   ALT U/L  --   --  21   AST U/L  --   --  33     Results from last 7 days   Lab Units 11/09/22  0708 11/08/22  0713 11/07/22  2112   INR  2 72* 2 40* 2 32*     Results from last 7 days   Lab Units 11/08/22  0705 11/07/22  1600   MAGNESIUM mg/dL 2 3 2 3

## 2022-11-09 NOTE — ASSESSMENT & PLAN NOTE
· Continue rate control meds  · Coumadin being withheld due to potential cardiac catheterization tomorrow, continue to hold due to cardiac catheterization  · Will start heparin

## 2022-11-09 NOTE — ASSESSMENT & PLAN NOTE
Wt Readings from Last 3 Encounters:   11/09/22 68 kg (150 lb)     Last echo 1/22: EF>70%,   · Will repeat echo while inpatient    Awaiting echo to be read by cardiologist  · Monitor I/Os, daily weights  · Monitor electrolytes  · Continue metoprolol, torsemide

## 2022-11-09 NOTE — PLAN OF CARE
Problem: MOBILITY - ADULT  Goal: Maintain or return to baseline ADL function  Description: INTERVENTIONS:  -  Assess patient's ability to carry out ADLs; assess patient's baseline for ADL function and identify physical deficits which impact ability to perform ADLs (bathing, care of mouth/teeth, toileting, grooming, dressing, etc )  - Assess/evaluate cause of self-care deficits   - Assess range of motion  - Assess patient's mobility; develop plan if impaired  - Assess patient's need for assistive devices and provide as appropriate  - Encourage maximum independence but intervene and supervise when necessary  - Involve family in performance of ADLs  - Assess for home care needs following discharge   - Consider OT consult to assist with ADL evaluation and planning for discharge  - Provide patient education as appropriate  Outcome: Progressing     Problem: Potential for Falls  Goal: Patient will remain free of falls  Description: INTERVENTIONS:  -  Assess patient's ability to carry out ADLs; assess patient's baseline for ADL function and identify physical deficits which impact ability to perform ADLs (bathing, care of mouth/teeth, toileting, grooming, dressing, etc )  - Assess/evaluate cause of self-care deficits   - Assess range of motion  - Assess patient's mobility; develop plan if impaired  - Assess patient's need for assistive devices and provide as appropriate  - Encourage maximum independence but intervene and supervise when necessary  - Involve family in performance of ADLs  - Assess for home care needs following discharge   - Consider OT consult to assist with ADL evaluation and planning for discharge  - Provide patient education as appropriate  Outcome: Progressing     Problem: INFECTION - ADULT  Goal: Absence or prevention of progression during hospitalization  Description: INTERVENTIONS:  - Assess and monitor for signs and symptoms of infection  - Monitor lab/diagnostic results  - Monitor all insertion sites, i e  indwelling lines, tubes, and drains  - Monitor endotracheal if appropriate and nasal secretions for changes in amount and color  - [place] appropriate cooling/warming therapies per order  - Administer medications as ordered  - Instruct and encourage patient and family to use good hand hygiene technique  - Identify and instruct in appropriate isolation precautions for identified infection/condition  Outcome: Progressing

## 2022-11-09 NOTE — PLAN OF CARE
Problem: MOBILITY - ADULT  Goal: Maintain or return to baseline ADL function  Description: INTERVENTIONS:  -  Assess patient's ability to carry out ADLs; assess patient's baseline for ADL function and identify physical deficits which impact ability to perform ADLs (bathing, care of mouth/teeth, toileting, grooming, dressing, etc )  - Assess/evaluate cause of self-care deficits   - Assess range of motion  - Assess patient's mobility; develop plan if impaired  - Assess patient's need for assistive devices and provide as appropriate  - Encourage maximum independence but intervene and supervise when necessary  - Involve family in performance of ADLs  - Assess for home care needs following discharge   - Consider OT consult to assist with ADL evaluation and planning for discharge  - Provide patient education as appropriate  Outcome: Progressing  Goal: Maintains/Returns to pre admission functional level  Description: INTERVENTIONS:  - Perform BMAT or MOVE assessment daily    - Set and communicate daily mobility goal to care team and patient/family/caregiver  - Collaborate with rehabilitation services on mobility goals if consulted  - Perform Range of Motion 3 times a day  - Reposition patient every 2 hours    - Dangle patient 3 times a day  - Stand patient 3 times a day  - Ambulate patient 3 times a day  - Out of bed to chair 3 times a day   - Out of bed for meals 3 times a day  - Out of bed for toileting  - Record patient progress and toleration of activity level   Outcome: Progressing     Problem: Potential for Falls  Goal: Patient will remain free of falls  Description: INTERVENTIONS:  - Educate patient/family on patient safety including physical limitations  - Instruct patient to call for assistance with activity   - Consult OT/PT to assist with strengthening/mobility   - Keep Call bell within reach  - Keep bed low and locked with side rails adjusted as appropriate  - Keep care items and personal belongings within reach  - Initiate and maintain comfort rounds  - Make Fall Risk Sign visible to staff  - Offer Toileting every 2 Hours, in advance of need  - Initiate/Maintain alarm  - Obtain necessary fall risk management equipment  - Apply yellow socks and bracelet for high fall risk patients  - Consider moving patient to room near nurses station  Outcome: Progressing     Problem: PAIN - ADULT  Goal: Verbalizes/displays adequate comfort level or baseline comfort level  Description: Interventions:  - Encourage patient to monitor pain and request assistance  - Assess pain using appropriate pain scale  - Administer analgesics based on type and severity of pain and evaluate response  - Implement non-pharmacological measures as appropriate and evaluate response  - Consider cultural and social influences on pain and pain management  - Notify physician/advanced practitioner if interventions unsuccessful or patient reports new pain  Outcome: Progressing     Problem: INFECTION - ADULT  Goal: Absence or prevention of progression during hospitalization  Description: INTERVENTIONS:  - Assess and monitor for signs and symptoms of infection  - Monitor lab/diagnostic results  - Monitor all insertion sites, i e  indwelling lines, tubes, and drains  - Monitor endotracheal if appropriate and nasal secretions for changes in amount and color  - [place] appropriate cooling/warming therapies per order  - Administer medications as ordered  - Instruct and encourage patient and family to use good hand hygiene technique  - Identify and instruct in appropriate isolation precautions for identified infection/condition  Outcome: Progressing  Goal: Absence of fever/infection during neutropenic period  Description: INTERVENTIONS:  - Monitor WBC    Outcome: Progressing     Problem: SAFETY ADULT  Goal: Maintain or return to baseline ADL function  Description: INTERVENTIONS:  -  Assess patient's ability to carry out ADLs; assess patient's baseline for ADL function and identify physical deficits which impact ability to perform ADLs (bathing, care of mouth/teeth, toileting, grooming, dressing, etc )  - Assess/evaluate cause of self-care deficits   - Assess range of motion  - Assess patient's mobility; develop plan if impaired  - Assess patient's need for assistive devices and provide as appropriate  - Encourage maximum independence but intervene and supervise when necessary  - Involve family in performance of ADLs  - Assess for home care needs following discharge   - Consider OT consult to assist with ADL evaluation and planning for discharge  - Provide patient education as appropriate  Outcome: Progressing  Goal: Maintains/Returns to pre admission functional level  Description: INTERVENTIONS:  - Perform BMAT or MOVE assessment daily    - Set and communicate daily mobility goal to care team and patient/family/caregiver  - Collaborate with rehabilitation services on mobility goals if consulted  - Perform Range of Motion 3 times a day  - Reposition patient every 2 hours    - Dangle patient 3 times a day  - Stand patient 3 times a day  - Ambulate patient 3 times a day  - Out of bed to chair 3 times a day   - Out of bed for meals 3 times a day  - Out of bed for toileting  - Record patient progress and toleration of activity level   Outcome: Progressing  Goal: Patient will remain free of falls  Description: INTERVENTIONS:  - Educate patient/family on patient safety including physical limitations  - Instruct patient to call for assistance with activity   - Consult OT/PT to assist with strengthening/mobility   - Keep Call bell within reach  - Keep bed low and locked with side rails adjusted as appropriate  - Keep care items and personal belongings within reach  - Initiate and maintain comfort rounds  - Make Fall Risk Sign visible to staff  - Offer Toileting every 2 Hours, in advance of need  - Initiate/Maintain alarm  - Obtain necessary fall risk management equipment  - Apply yellow socks and bracelet for high fall risk patients  - Consider moving patient to room near nurses station  Outcome: Progressing     Problem: DISCHARGE PLANNING  Goal: Discharge to home or other facility with appropriate resources  Description: INTERVENTIONS:  - Identify barriers to discharge w/patient and caregiver  - Arrange for needed discharge resources and transportation as appropriate  - Identify discharge learning needs (meds, wound care, etc )  - Arrange for interpretive services to assist at discharge as needed  - Refer to Case Management Department for coordinating discharge planning if the patient needs post-hospital services based on physician/advanced practitioner order or complex needs related to functional status, cognitive ability, or social support system  Outcome: Progressing     Problem: Knowledge Deficit  Goal: Patient/family/caregiver demonstrates understanding of disease process, treatment plan, medications, and discharge instructions  Description: Complete learning assessment and assess knowledge base    Interventions:  - Provide teaching at level of understanding  - Provide teaching via preferred learning methods  Outcome: Progressing

## 2022-11-09 NOTE — ASSESSMENT & PLAN NOTE
Pt presented to ED after an episode of weakness with associated dizziness and lightheadedness around 1pm followed by 5 episodes nbnb vomiting and diarrhea  CT head: no acute intracranial abnormality  CT A/P: cardiomegaly with biatrial dilation  CXR: no evidence of vascular congestion   Cardiology following  Awaiting for echo to be read by cardiologist, patient now agreeable to cardiac catheterization    Will need INR to decrease prior to cardiac catheterization  · IV zofran

## 2022-11-09 NOTE — ASSESSMENT & PLAN NOTE
Lab Results   Component Value Date    EGFR 32 11/09/2022    EGFR 29 11/08/2022    EGFR 35 11/07/2022    CREATININE 1 50 (H) 11/09/2022    CREATININE 1 60 (H) 11/08/2022    CREATININE 1 37 (H) 11/07/2022     Cr at baseline, continue to monitor

## 2022-11-10 LAB
ANION GAP SERPL CALCULATED.3IONS-SCNC: 4 MMOL/L (ref 4–13)
ATRIAL RATE: 100 BPM
BUN SERPL-MCNC: 21 MG/DL (ref 5–25)
CALCIUM SERPL-MCNC: 8.9 MG/DL (ref 8.3–10.1)
CHLORIDE SERPL-SCNC: 105 MMOL/L (ref 96–108)
CO2 SERPL-SCNC: 32 MMOL/L (ref 21–32)
CREAT SERPL-MCNC: 1.36 MG/DL (ref 0.6–1.3)
ERYTHROCYTE [DISTWIDTH] IN BLOOD BY AUTOMATED COUNT: 14.1 % (ref 11.6–15.1)
GFR SERPL CREATININE-BSD FRML MDRD: 36 ML/MIN/1.73SQ M
GLUCOSE SERPL-MCNC: 91 MG/DL (ref 65–140)
HCT VFR BLD AUTO: 37.4 % (ref 34.8–46.1)
HGB BLD-MCNC: 12.2 G/DL (ref 11.5–15.4)
INR PPP: 2.38 (ref 0.84–1.19)
MCH RBC QN AUTO: 29.4 PG (ref 26.8–34.3)
MCHC RBC AUTO-ENTMCNC: 32.6 G/DL (ref 31.4–37.4)
MCV RBC AUTO: 90 FL (ref 82–98)
PLATELET # BLD AUTO: 172 THOUSANDS/UL (ref 149–390)
PMV BLD AUTO: 9.9 FL (ref 8.9–12.7)
POTASSIUM SERPL-SCNC: 4.1 MMOL/L (ref 3.5–5.3)
PROTHROMBIN TIME: 25.5 SECONDS (ref 11.6–14.5)
QRS AXIS: 55 DEGREES
QRSD INTERVAL: 96 MS
QT INTERVAL: 352 MS
QTC INTERVAL: 442 MS
RBC # BLD AUTO: 4.15 MILLION/UL (ref 3.81–5.12)
SODIUM SERPL-SCNC: 141 MMOL/L (ref 135–147)
T WAVE AXIS: 37 DEGREES
VENTRICULAR RATE: 95 BPM
WBC # BLD AUTO: 5.49 THOUSAND/UL (ref 4.31–10.16)

## 2022-11-10 RX ORDER — FUROSEMIDE 20 MG/1
20 TABLET ORAL DAILY
Status: DISCONTINUED | OUTPATIENT
Start: 2022-11-10 | End: 2022-11-11

## 2022-11-10 RX ADMIN — FUROSEMIDE 20 MG: 20 TABLET ORAL at 16:39

## 2022-11-10 RX ADMIN — METOPROLOL SUCCINATE 25 MG: 25 TABLET, EXTENDED RELEASE ORAL at 10:12

## 2022-11-10 RX ADMIN — ASPIRIN 81 MG: 81 TABLET, COATED ORAL at 10:12

## 2022-11-10 NOTE — ASSESSMENT & PLAN NOTE
· Pt presented to ED after an episode of weakness with associated dizziness and lightheadedness around 1pm followed by 5 episodes nbnb vomiting and diarrhea  · CT head: no acute intracranial abnormality  · CT A/P: cardiomegaly with biatrial dilation  · CXR: no evidence of vascular congestion   · Cardiology following  patient now agreeable to cardiac catheterization    Will need INR to (currently 2 3) decrease (1 8)prior to cardiac catheterization  · Will start heparin when INR is 2 0  · Echo showed preserved ejection fraction of 55%, RV mildly dilated, biatrial dilation, bioprosthetic TAVR valve, and moderate pulmonary hypertension  · IV zofran

## 2022-11-10 NOTE — PLAN OF CARE
Problem: MOBILITY - ADULT  Goal: Maintain or return to baseline ADL function  Description: INTERVENTIONS:  -  Assess patient's ability to carry out ADLs; assess patient's baseline for ADL function and identify physical deficits which impact ability to perform ADLs (bathing, care of mouth/teeth, toileting, grooming, dressing, etc )  - Assess/evaluate cause of self-care deficits   - Assess range of motion  - Assess patient's mobility; develop plan if impaired  - Assess patient's need for assistive devices and provide as appropriate  - Encourage maximum independence but intervene and supervise when necessary  - Involve family in performance of ADLs  - Assess for home care needs following discharge   - Consider OT consult to assist with ADL evaluation and planning for discharge  - Provide patient education as appropriate  Outcome: Progressing     Problem: PAIN - ADULT  Goal: Verbalizes/displays adequate comfort level or baseline comfort level  Description: Interventions:  - Encourage patient to monitor pain and request assistance  - Assess pain using appropriate pain scale  - Administer analgesics based on type and severity of pain and evaluate response  - Implement non-pharmacological measures as appropriate and evaluate response  - Consider cultural and social influences on pain and pain management  - Notify physician/advanced practitioner if interventions unsuccessful or patient reports new pain  Outcome: Progressing     Problem: INFECTION - ADULT  Goal: Absence or prevention of progression during hospitalization  Description: INTERVENTIONS:  - Assess and monitor for signs and symptoms of infection  - Monitor lab/diagnostic results  - Monitor all insertion sites, i e  indwelling lines, tubes, and drains  - Monitor endotracheal if appropriate and nasal secretions for changes in amount and color  - [place] appropriate cooling/warming therapies per order  - Administer medications as ordered  - Instruct and encourage patient and family to use good hand hygiene technique  - Identify and instruct in appropriate isolation precautions for identified infection/condition  Outcome: Progressing     Problem: DISCHARGE PLANNING  Goal: Discharge to home or other facility with appropriate resources  Description: INTERVENTIONS:  - Identify barriers to discharge w/patient and caregiver  - Arrange for needed discharge resources and transportation as appropriate  - Identify discharge learning needs (meds, wound care, etc )  - Arrange for interpretive services to assist at discharge as needed  - Refer to Case Management Department for coordinating discharge planning if the patient needs post-hospital services based on physician/advanced practitioner order or complex needs related to functional status, cognitive ability, or social support system  Outcome: Progressing     Problem: Knowledge Deficit  Goal: Patient/family/caregiver demonstrates understanding of disease process, treatment plan, medications, and discharge instructions  Description: Complete learning assessment and assess knowledge base    Interventions:  - Provide teaching at level of understanding  - Provide teaching via preferred learning methods  Outcome: Progressing

## 2022-11-10 NOTE — ASSESSMENT & PLAN NOTE
Likely in setting of intractable nausea and vomiting, continue to trend  tnl 0 hr: 63  tnl 2 hr: 132  · Cardiology following, read recommendations under weakness

## 2022-11-10 NOTE — ASSESSMENT & PLAN NOTE
Wt Readings from Last 3 Encounters:   11/10/22 69 8 kg (153 lb 14 1 oz)     Last echo 1/22: EF>70%,   · Echo results under weakness  · Monitor I/Os, daily weights  · Monitor electrolytes  · Continue metoprolol, torsemide

## 2022-11-10 NOTE — PROGRESS NOTES
[facility]  Progress Note - Cynthia Zhu 1940, 80 y o  female MRN: [id number]  Unit/Bed#: -01 Encounter: [id number]  Primary Care Provider: No primary care provider on file  Date and time admitted to hospital: 11/7/2022  2:54 PM    * Weakness  Assessment & Plan  · Pt presented to ED after an episode of weakness with associated dizziness and lightheadedness around 1pm followed by 5 episodes nbnb vomiting and diarrhea  · CT head: no acute intracranial abnormality  · CT A/P: cardiomegaly with biatrial dilation  · CXR: no evidence of vascular congestion   · Cardiology following  patient now agreeable to cardiac catheterization    Will need INR to (currently 2 3) decrease (1 8)prior to cardiac catheterization  · Will start heparin when INR is 2 0  · Echo showed preserved ejection fraction of 55%, RV mildly dilated, biatrial dilation, bioprosthetic TAVR valve, and moderate pulmonary hypertension  · IV zofran    Elevated troponin  Assessment & Plan  Likely in setting of intractable nausea and vomiting, continue to trend  tnl 0 hr: 63  tnl 2 hr: 132  · Cardiology following, read recommendations under weakness    Chronic diastolic heart failure (HCC)  Assessment & Plan  Wt Readings from Last 3 Encounters:   11/10/22 69 8 kg (153 lb 14 1 oz)     Last echo 1/22: EF>70%,   · Echo results under weakness  · Monitor I/Os, daily weights  · Monitor electrolytes  · Continue metoprolol, torsemide    Atrial fibrillation (HCC)  Assessment & Plan  · Continue rate control meds  · Coumadin being withheld due to potential cardiac catheterization tomorrow, continue to hold due to cardiac catheterization  · Will start heparin at 2 0 INR    Hypertension  Assessment & Plan  · Continue home BP meds    CKD (chronic kidney disease)  Assessment & Plan  Lab Results   Component Value Date    EGFR 36 11/10/2022    EGFR 32 11/09/2022    EGFR 29 11/08/2022    CREATININE 1 36 (H) 11/10/2022    CREATININE 1 50 (H) 2022    CREATININE 1 60 (H) 2022     Cr at baseline, continue to monitor        VTE Pharmacologic Prophylaxis: VTE Score: 4 Moderate Risk (Score 3-4) - Pharmacological DVT Prophylaxis Contraindicated  Sequential Compression Devices Ordered  Patient Centered Rounds: I performed bedside rounds with nursing staff today  Discussions with Specialists or Other Care Team Provider:  Cardiology    Education and Discussions with Family / Patient: Updated  () at bedside  Time Spent for Care: 30 minutes  More than 50% of total time spent on counseling and coordination of care as described above  Current Length of Stay: 2 day(s)  Current Patient Status: Inpatient   Certification Statement: The patient will continue to require additional inpatient hospital stay due to Cardiac catheterization  Discharge Plan: Anticipate discharge in 48 hrs to home  Code Status: Level 1 - Full Code    Subjective:   Patient continues to feel well  Denies any palpitations, lightheadedness, chest pain/pressure, or shortness of breath  Objective:     Vitals:   Temp (24hrs), Av 1 °F (36 7 °C), Min:97 9 °F (36 6 °C), Max:98 2 °F (36 8 °C)    Temp:  [97 9 °F (36 6 °C)-98 2 °F (36 8 °C)] 98 1 °F (36 7 °C)  HR:  [69-83] 69  Resp:  [13-18] 17  BP: (126-134)/(71-97) 132/71  SpO2:  [93 %-94 %] 93 %  Body mass index is 30 05 kg/m²  Input and Output Summary (last 24 hours): Intake/Output Summary (Last 24 hours) at 11/10/2022 1531  Last data filed at 11/10/2022 1301  Gross per 24 hour   Intake 480 ml   Output --   Net 480 ml       Physical Exam:   Physical Exam  Vitals and nursing note reviewed  Constitutional:       Appearance: She is normal weight  HENT:      Head: Normocephalic  Nose: Nose normal       Mouth/Throat:      Mouth: Mucous membranes are moist       Pharynx: Oropharynx is clear  Eyes:      General: No scleral icterus       Conjunctiva/sclera: Conjunctivae normal       Pupils: Pupils are equal, round, and reactive to light  Cardiovascular:      Rate and Rhythm: Normal rate  Rhythm irregular  Heart sounds: No murmur heard  No friction rub  No gallop  Pulmonary:      Effort: Pulmonary effort is normal  No respiratory distress  Breath sounds: Normal breath sounds  No stridor  No wheezing, rhonchi or rales  Abdominal:      General: Abdomen is flat  Palpations: Abdomen is soft  Musculoskeletal:         General: Normal range of motion  Cervical back: Normal range of motion and neck supple  Right lower leg: Edema (Nonpitting edema) present  Left lower leg: Edema ( nonpitting edema) present  Lymphadenopathy:      Cervical: No cervical adenopathy  Skin:     General: Skin is warm  Coloration: Skin is not jaundiced or pale  Findings: No bruising, erythema or lesion  Neurological:      General: No focal deficit present  Mental Status: She is alert and oriented to person, place, and time  Mental status is at baseline  Cranial Nerves: No cranial nerve deficit  Motor: No weakness  Psychiatric:         Mood and Affect: Mood normal          Behavior: Behavior normal          Thought Content: Thought content normal           Additional Data:     Labs:  Results from last 7 days   Lab Units 11/10/22  0609 11/08/22  0705 11/07/22  1600   WBC Thousand/uL 5 49   < > 8 24   HEMOGLOBIN g/dL 12 2   < > 13 3   HEMATOCRIT % 37 4   < > 40 7   PLATELETS Thousands/uL 172   < > 166   NEUTROS PCT %  --   --  78*   LYMPHS PCT %  --   --  13*   MONOS PCT %  --   --  6   EOS PCT %  --   --  1    < > = values in this interval not displayed       Results from last 7 days   Lab Units 11/10/22  0609 11/08/22  0705 11/07/22  1600   SODIUM mmol/L 141   < > 142   POTASSIUM mmol/L 4 1   < > 3 5   CHLORIDE mmol/L 105   < > 103   CO2 mmol/L 32   < > 28   BUN mg/dL 21   < > 26*   CREATININE mg/dL 1 36*   < > 1 37*   ANION GAP mmol/L 4   < > 11   CALCIUM mg/dL 8 9 < > 9 7   ALBUMIN g/dL  --   --  4 1   TOTAL BILIRUBIN mg/dL  --   --  0 58   ALK PHOS U/L  --   --  69   ALT U/L  --   --  21   AST U/L  --   --  33   GLUCOSE RANDOM mg/dL 91   < > 129    < > = values in this interval not displayed  Results from last 7 days   Lab Units 11/10/22  0609   INR  2 38*             Results from last 7 days   Lab Units 11/07/22  2241   PROCALCITONIN ng/ml 0 06       Lines/Drains:  Invasive Devices  Report    Peripheral Intravenous Line  Duration           Peripheral IV 11/07/22 Left Antecubital 3 days                  Telemetry:  Telemetry Orders (From admission, onward)             48 Hour Telemetry Monitoring  Continuous x 48 hours        References:    Telemetry Guidelines   Question:  Reason for 48 Hour Telemetry  Answer:  Arrhythmias Requiring Medical Therapy (eg  SVT, Vtach/fib, Bradycardia, Uncontrolled A-fib)                 Telemetry Reviewed: Atrial fibrillation  HR averaging 70s to 80s  Indication for Continued Telemetry Use: Arrthymias requiring medical therapy             Imaging: Reviewed radiology reports from this admission including: chest xray, abdominal/pelvic CT and CT head    Recent Cultures (last 7 days):         Last 24 Hours Medication List:   Current Facility-Administered Medications   Medication Dose Route Frequency Provider Last Rate   • aspirin  81 mg Oral Daily Rhys Wade MD     • furosemide  20 mg Oral Daily Cesia Ramirez PA-C     • metoprolol succinate  25 mg Oral Daily Rosalie Soares PA-C     • ondansetron (FOR EMS ONLY)  1 each Does not apply Once Rascon Endo, DO          Today, Patient Was Seen By: Cesia Ramirez    **Please Note: This note may have been constructed using a voice recognition system  **

## 2022-11-10 NOTE — PROGRESS NOTES
Cardiology Progress Note - Rosangela Cuevas 80 y o  female MRN: [id number]    Unit/Bed#: -01 Encounter: [id number]      Assessment/Plan:  1  Elevated troponins, peak troponin 425, next 1 down trending to 234  Symptoms are suspicious for angina  Echocardiogram done EF 55%, RV is mildly dilated, biatrial dilation, bioprosthetic TAVR valve noted, mild MR, moderate TR, moderate pulmonary hypertension, PASP 50mmhg  Plan for cardiac catheterization when INR is less than or equal to 1 8  Start heparin when INR is less than 2      2  Chronic atrial fibrillation, heart rate stable in the 70s to 80s  Continue with aspirin, Toprol  3  Aortic stenosis status post TAVR, new echocardiogram as above  4  Hypertension, stable continue with Toprol    5  Acute kidney injury, creatinine today 1 3 down from 1 5  Patient will need renal optimization prior to cardiac catheterization  Subjective:   Patient seen and examined  No significant events overnight  Denies chest pain    Objective:     Vitals: Blood pressure 134/97, pulse 75, temperature 98 2 °F (36 8 °C), resp  rate 13, height 5' (1 524 m), weight 69 8 kg (153 lb 14 1 oz), SpO2 94 %  , Body mass index is 30 05 kg/m² ,   Orthostatic Blood Pressures    Flowsheet Row Most Recent Value   Blood Pressure 134/97 filed at 11/10/2022 4866   Patient Position - Orthostatic VS Sitting filed at 11/09/2022 0700            Intake/Output Summary (Last 24 hours) at 11/10/2022 1302  Last data filed at 11/10/2022 0800  Gross per 24 hour   Intake 240 ml   Output --   Net 240 ml         Physical Exam:    GEN: Rosangela Cuevas appears well, alert and oriented x 3, pleasant and cooperative   HEENT: pupils equal, round, and reactive to light; extraocular muscles intact  NECK: supple, no carotid bruits   HEART: Irregularly irregular, normal S1 and S2, no murmurs, clicks, gallops or rubs   LUNGS: clear to auscultation bilaterally; no wheezes, rales, or rhonchi   ABDOMEN: normal bowel sounds, soft, no tenderness, no distention  EXTREMITIES: peripheral pulses normal; no clubbing, cyanosis, or edema      Medications:      Current Facility-Administered Medications:   •  aspirin (ECOTRIN LOW STRENGTH) EC tablet 81 mg, 81 mg, Oral, Daily, Gissell Escobar MD, 81 mg at 11/10/22 1012  •  metoprolol succinate (TOPROL-XL) 24 hr tablet 25 mg, 25 mg, Oral, Daily, Joe Noguera PA-C, 25 mg at 11/10/22 1012  •  ondansetron (FOR EMS ONLY) (ZOFRAN) 4 mg/2 mL injection 4 mg, 1 each, Does not apply, Once, Gracie Beckhamtingham,      Labs & Results:    Results from last 7 days   Lab Units 11/08/22  1350 11/08/22  1151 11/08/22  0909 11/07/22  2112 11/07/22  1833 11/07/22  1600   HS TNI 0HR ng/L  --   --  350*  --   --  63*   HS TNI 2HR ng/L  --  417*  --   --  132*  --    HS TNI 4HR ng/L 425*  --   --  169*  --   --    HSTNI D4 ng/L 75*  --   --  106*  --   --    NT-PRO BNP pg/mL  --   --   --   --   --  1,040*     Results from last 7 days   Lab Units 11/10/22  0609 11/09/22  0708 11/08/22  0705   WBC Thousand/uL 5 49 5 84 7 95   HEMOGLOBIN g/dL 12 2 12 8 13 4   HEMATOCRIT % 37 4 39 3 40 0   PLATELETS Thousands/uL 172 171 194         Results from last 7 days   Lab Units 11/10/22  0609 11/09/22  0708 11/08/22  0705 11/07/22  1600   POTASSIUM mmol/L 4 1 3 9 3 8 3 5   CHLORIDE mmol/L 105 103 101 103   CO2 mmol/L 32 29 32 28   BUN mg/dL 21 26* 24 26*   CREATININE mg/dL 1 36* 1 50* 1 60* 1 37*   CALCIUM mg/dL 8 9 9 0 9 4 9 7   ALK PHOS U/L  --   --   --  69   ALT U/L  --   --   --  21   AST U/L  --   --   --  33     Results from last 7 days   Lab Units 11/10/22  0609 11/09/22  0708 11/08/22  0713   INR  2 38* 2 72* 2 40*     Results from last 7 days   Lab Units 11/08/22  0705 11/07/22  1600   MAGNESIUM mg/dL 2 3 2 3

## 2022-11-10 NOTE — PLAN OF CARE
Problem: MOBILITY - ADULT  Goal: Maintain or return to baseline ADL function  Description: INTERVENTIONS:  -  Assess patient's ability to carry out ADLs; assess patient's baseline for ADL function and identify physical deficits which impact ability to perform ADLs (bathing, care of mouth/teeth, toileting, grooming, dressing, etc )  - Assess/evaluate cause of self-care deficits   - Assess range of motion  - Assess patient's mobility; develop plan if impaired  - Assess patient's need for assistive devices and provide as appropriate  - Encourage maximum independence but intervene and supervise when necessary  - Involve family in performance of ADLs  - Assess for home care needs following discharge   - Consider OT consult to assist with ADL evaluation and planning for discharge  - Provide patient education as appropriate  Outcome: Progressing  Goal: Maintains/Returns to pre admission functional level  Description: INTERVENTIONS:  - Perform BMAT or MOVE assessment daily    - Set and communicate daily mobility goal to care team and patient/family/caregiver  - Collaborate with rehabilitation services on mobility goals if consulted  - Perform Range of Motion 3 times a day  - Reposition patient every 3 hours    - Dangle patient 3 times a day  - Stand patient 3 times a day  - Ambulate patient 3 times a day  - Out of bed to chair 3 times a day   - Out of bed for meals 3 times a day  - Out of bed for toileting  - Record patient progress and toleration of activity level   Outcome: Progressing     Problem: Potential for Falls  Goal: Patient will remain free of falls  Description: INTERVENTIONS:  - Educate patient/family on patient safety including physical limitations  - Instruct patient to call for assistance with activity   - Consult OT/PT to assist with strengthening/mobility   - Keep Call bell within reach  - Keep bed low and locked with side rails adjusted as appropriate  - Keep care items and personal belongings within reach  - Initiate and maintain comfort rounds  - Make Fall Risk Sign visible to staff  - Offer Toileting every 2 Hours, in advance of need  - Initiate/Maintain bed alarm  - Obtain necessary fall risk management equipment  - Apply yellow socks and bracelet for high fall risk patients  - Consider moving patient to room near nurses station  Outcome: Progressing     Problem: PAIN - ADULT  Goal: Verbalizes/displays adequate comfort level or baseline comfort level  Description: Interventions:  - Encourage patient to monitor pain and request assistance  - Assess pain using appropriate pain scale  - Administer analgesics based on type and severity of pain and evaluate response  - Implement non-pharmacological measures as appropriate and evaluate response  - Consider cultural and social influences on pain and pain management  - Notify physician/advanced practitioner if interventions unsuccessful or patient reports new pain  Outcome: Progressing     Problem: INFECTION - ADULT  Goal: Absence or prevention of progression during hospitalization  Description: INTERVENTIONS:  - Assess and monitor for signs and symptoms of infection  - Monitor lab/diagnostic results  - Monitor all insertion sites, i e  indwelling lines, tubes, and drains  - Monitor endotracheal if appropriate and nasal secretions for changes in amount and color  - [place] appropriate cooling/warming therapies per order  - Administer medications as ordered  - Instruct and encourage patient and family to use good hand hygiene technique  - Identify and instruct in appropriate isolation precautions for identified infection/condition  Outcome: Progressing  Goal: Absence of fever/infection during neutropenic period  Description: INTERVENTIONS:  - Monitor WBC    Outcome: Progressing     Problem: SAFETY ADULT  Goal: Maintain or return to baseline ADL function  Description: INTERVENTIONS:  -  Assess patient's ability to carry out ADLs; assess patient's baseline for ADL function and identify physical deficits which impact ability to perform ADLs (bathing, care of mouth/teeth, toileting, grooming, dressing, etc )  - Assess/evaluate cause of self-care deficits   - Assess range of motion  - Assess patient's mobility; develop plan if impaired  - Assess patient's need for assistive devices and provide as appropriate  - Encourage maximum independence but intervene and supervise when necessary  - Involve family in performance of ADLs  - Assess for home care needs following discharge   - Consider OT consult to assist with ADL evaluation and planning for discharge  - Provide patient education as appropriate  Outcome: Progressing  Goal: Maintains/Returns to pre admission functional level  Description: INTERVENTIONS:  - Perform BMAT or MOVE assessment daily    - Set and communicate daily mobility goal to care team and patient/family/caregiver  - Collaborate with rehabilitation services on mobility goals if consulted  - Perform Range of Motion 3 times a day  - Reposition patient every 3 hours    - Dangle patient 3 times a day  - Stand patient 3 times a day  - Ambulate patient 3 times a day  - Out of bed to chair 3 times a day   - Out of bed for meals 3 times a day  - Out of bed for toileting  - Record patient progress and toleration of activity level   Outcome: Progressing  Goal: Patient will remain free of falls  Description: INTERVENTIONS:  - Educate patient/family on patient safety including physical limitations  - Instruct patient to call for assistance with activity   - Consult OT/PT to assist with strengthening/mobility   - Keep Call bell within reach  - Keep bed low and locked with side rails adjusted as appropriate  - Keep care items and personal belongings within reach  - Initiate and maintain comfort rounds  - Make Fall Risk Sign visible to staff  - Offer Toileting every 2 Hours, in advance of need  - Initiate/Maintain bed alarm  - Obtain necessary fall risk management equipment  - Apply yellow socks and bracelet for high fall risk patients  - Consider moving patient to room near nurses station  Outcome: Progressing     Problem: DISCHARGE PLANNING  Goal: Discharge to home or other facility with appropriate resources  Description: INTERVENTIONS:  - Identify barriers to discharge w/patient and caregiver  - Arrange for needed discharge resources and transportation as appropriate  - Identify discharge learning needs (meds, wound care, etc )  - Arrange for interpretive services to assist at discharge as needed  - Refer to Case Management Department for coordinating discharge planning if the patient needs post-hospital services based on physician/advanced practitioner order or complex needs related to functional status, cognitive ability, or social support system  Outcome: Progressing     Problem: Knowledge Deficit  Goal: Patient/family/caregiver demonstrates understanding of disease process, treatment plan, medications, and discharge instructions  Description: Complete learning assessment and assess knowledge base    Interventions:  - Provide teaching at level of understanding  - Provide teaching via preferred learning methods  Outcome: Progressing

## 2022-11-10 NOTE — ASSESSMENT & PLAN NOTE
· Continue rate control meds  · Coumadin being withheld due to potential cardiac catheterization tomorrow, continue to hold due to cardiac catheterization  · Will start heparin at 2 0 INR

## 2022-11-10 NOTE — ASSESSMENT & PLAN NOTE
Lab Results   Component Value Date    EGFR 36 11/10/2022    EGFR 32 11/09/2022    EGFR 29 11/08/2022    CREATININE 1 36 (H) 11/10/2022    CREATININE 1 50 (H) 11/09/2022    CREATININE 1 60 (H) 11/08/2022     Cr at baseline, continue to monitor

## 2022-11-11 LAB
ANION GAP SERPL CALCULATED.3IONS-SCNC: 8 MMOL/L (ref 4–13)
APTT PPP: 203 SECONDS (ref 23–37)
APTT PPP: 35 SECONDS (ref 23–37)
BACTERIA UR QL AUTO: ABNORMAL /HPF
BILIRUB UR QL STRIP: NEGATIVE
BUN SERPL-MCNC: 22 MG/DL (ref 5–25)
CALCIUM SERPL-MCNC: 9 MG/DL (ref 8.3–10.1)
CHLORIDE SERPL-SCNC: 105 MMOL/L (ref 96–108)
CLARITY UR: CLEAR
CO2 SERPL-SCNC: 28 MMOL/L (ref 21–32)
COLOR UR: COLORLESS
CREAT SERPL-MCNC: 1.25 MG/DL (ref 0.6–1.3)
CREAT UR-MCNC: 31 MG/DL
ERYTHROCYTE [DISTWIDTH] IN BLOOD BY AUTOMATED COUNT: 13.9 % (ref 11.6–15.1)
GFR SERPL CREATININE-BSD FRML MDRD: 40 ML/MIN/1.73SQ M
GLUCOSE SERPL-MCNC: 91 MG/DL (ref 65–140)
GLUCOSE UR STRIP-MCNC: NEGATIVE MG/DL
HCT VFR BLD AUTO: 35.2 % (ref 34.8–46.1)
HGB BLD-MCNC: 11.9 G/DL (ref 11.5–15.4)
HGB UR QL STRIP.AUTO: NEGATIVE
HYALINE CASTS #/AREA URNS LPF: ABNORMAL /LPF
INR PPP: 1.83 (ref 0.84–1.19)
INR PPP: 1.95 (ref 0.84–1.19)
KETONES UR STRIP-MCNC: NEGATIVE MG/DL
LEUKOCYTE ESTERASE UR QL STRIP: ABNORMAL
MCH RBC QN AUTO: 29.5 PG (ref 26.8–34.3)
MCHC RBC AUTO-ENTMCNC: 33.8 G/DL (ref 31.4–37.4)
MCV RBC AUTO: 87 FL (ref 82–98)
MICROALBUMIN UR-MCNC: 5.5 MG/L (ref 0–20)
MICROALBUMIN/CREAT 24H UR: 18 MG/G CREATININE (ref 0–30)
MUCOUS THREADS UR QL AUTO: ABNORMAL
NITRITE UR QL STRIP: NEGATIVE
NON-SQ EPI CELLS URNS QL MICRO: ABNORMAL /HPF
PH UR STRIP.AUTO: 7 [PH]
PLATELET # BLD AUTO: 149 THOUSANDS/UL (ref 149–390)
PMV BLD AUTO: 10 FL (ref 8.9–12.7)
POTASSIUM SERPL-SCNC: 4.2 MMOL/L (ref 3.5–5.3)
PROT UR STRIP-MCNC: NEGATIVE MG/DL
PROTHROMBIN TIME: 20.7 SECONDS (ref 11.6–14.5)
PROTHROMBIN TIME: 21.9 SECONDS (ref 11.6–14.5)
RBC # BLD AUTO: 4.03 MILLION/UL (ref 3.81–5.12)
RBC #/AREA URNS AUTO: ABNORMAL /HPF
SODIUM SERPL-SCNC: 141 MMOL/L (ref 135–147)
SP GR UR STRIP.AUTO: 1.01 (ref 1–1.03)
UROBILINOGEN UR STRIP-ACNC: <2 MG/DL
WBC # BLD AUTO: 4.99 THOUSAND/UL (ref 4.31–10.16)
WBC #/AREA URNS AUTO: ABNORMAL /HPF

## 2022-11-11 RX ORDER — HEPARIN SODIUM 5000 [USP'U]/ML
5000 INJECTION, SOLUTION INTRAVENOUS; SUBCUTANEOUS EVERY 8 HOURS SCHEDULED
Status: DISCONTINUED | OUTPATIENT
Start: 2022-11-11 | End: 2022-11-11

## 2022-11-11 RX ORDER — SODIUM CHLORIDE 9 MG/ML
75 INJECTION, SOLUTION INTRAVENOUS CONTINUOUS
Status: DISCONTINUED | OUTPATIENT
Start: 2022-11-11 | End: 2022-11-11

## 2022-11-11 RX ORDER — HEPARIN SODIUM 1000 [USP'U]/ML
2800 INJECTION, SOLUTION INTRAVENOUS; SUBCUTANEOUS
Status: DISCONTINUED | OUTPATIENT
Start: 2022-11-11 | End: 2022-11-16

## 2022-11-11 RX ORDER — HEPARIN SODIUM 10000 [USP'U]/100ML
3-30 INJECTION, SOLUTION INTRAVENOUS
Status: DISCONTINUED | OUTPATIENT
Start: 2022-11-11 | End: 2022-11-16

## 2022-11-11 RX ORDER — HEPARIN SODIUM 1000 [USP'U]/ML
5600 INJECTION, SOLUTION INTRAVENOUS; SUBCUTANEOUS
Status: DISCONTINUED | OUTPATIENT
Start: 2022-11-11 | End: 2022-11-16

## 2022-11-11 RX ORDER — SODIUM CHLORIDE 9 MG/ML
125 INJECTION, SOLUTION INTRAVENOUS CONTINUOUS
Status: DISPENSED | OUTPATIENT
Start: 2022-11-11 | End: 2022-11-11

## 2022-11-11 RX ADMIN — HEPARIN SODIUM 18 UNITS/KG/HR: 10000 INJECTION, SOLUTION INTRAVENOUS at 14:07

## 2022-11-11 RX ADMIN — METOPROLOL SUCCINATE 25 MG: 25 TABLET, EXTENDED RELEASE ORAL at 08:19

## 2022-11-11 RX ADMIN — HEPARIN SODIUM 5000 UNITS: 5000 INJECTION INTRAVENOUS; SUBCUTANEOUS at 10:10

## 2022-11-11 RX ADMIN — SODIUM CHLORIDE 75 ML/HR: 0.9 INJECTION, SOLUTION INTRAVENOUS at 10:59

## 2022-11-11 RX ADMIN — FUROSEMIDE 20 MG: 20 TABLET ORAL at 08:19

## 2022-11-11 RX ADMIN — ASPIRIN 81 MG: 81 TABLET, COATED ORAL at 08:19

## 2022-11-11 NOTE — PROGRESS NOTES
General Cardiology   Progress Note   Jaron Ash 80 y o  female MRN: [id number]  Unit/Bed#: -01 Encounter: [id number]    Assessment/Plan:    1  Elevated troponin  -troponin with a peak of 425  -symptoms suspicious for angina  -TTE performed which revealed normal systolic function with an EF 55%, moderate tricuspid regurgitation, moderate pulmonary hypertension, and bioprosthetic aortic valve noted  -plan for coronary angiography today as INR is 1 83  -nephrology evaluated and okay to proceed  -telemetry monitoring    2  Chronic atrial fibrillation  -heart rates currently controlled in the 80s  -continue metoprolol succinate for rate control  -warfarin currently on hold, INR 1 38, start heparin drip for anticoagulation    3  Hypertension  -blood pressure is overall well controlled  -continue metoprolol succinate    4  Aortic stenosis s/p TAVR  -noted on TTE with no evidence of regurgitation or significant stenosis    5  Acute kidney injury on CKD sta ge III  -baseline creatinine 1 3  -evaluated by Nephrology and cleared to proceed with coronary angiography today, recommend holding diuretic temporarily    Subjective:   Patient seen and examined  No significant events since the last encounter       REVIEW OF SYSTEMS:  Constitutional:  Denies fever or chills   Eyes:  Denies change in visual acuity   HENT:  Denies nasal congestion or sore throat   Respiratory:  Denies cough, orthopnea, PND or shortness of breath   Cardiovascular:  Denies chest pain, palpitations or edema   GI:  Denies abdominal pain, nausea, vomiting, bloody stools, constipation or diarrhea   :  Denies dysuria, frequency, difficulty in urination or nocturia  Musculoskeletal:  Denies back pain or joint pain   Neurologic:  Denies headache, focal weakness or sensory changes   Endocrine:  Denies polyuria or polydipsia   Lymphatic:  Denies swollen glands   Psychiatric:  Denies depression or anxiety     Objective:   Vitals:  Vitals:    11/11/22 1115 BP: 142/88   Pulse: 73   Resp:    Temp: 98 2 °F (36 8 °C)   SpO2: 92%       Body mass index is 30 48 kg/m²  Systolic (68KCB), VKX:078 , Min:132 , AIH:245     Diastolic (06IRE), WNE:02, Min:71, Max:88      Intake/Output Summary (Last 24 hours) at 11/11/2022 1155  Last data filed at 11/11/2022 0332  Gross per 24 hour   Intake 240 ml   Output 1050 ml   Net -810 ml     Weight (last 2 days)     Date/Time Weight    11/11/22 0536 70 8 (156 09)    11/11/22 0500 70 8 (156 09)    11/10/22 0600 69 8 (153 88)    11/09/22 0900 68 (150)          Telemetry Review: No significant arrhythmias seen on telemetry review  PHYSICAL EXAMS  General:  Patient is not in acute distress, laying in the bed comfortably, awake  Head: Normocephalic, Atraumatic  HEENT: White sclera, pink conjunctiva  Neck:  Supple, no neck vein distention  Respiratory: clear to auscultation   Cardiovascular: S1-S2 normal, no murmurs, thrills, gallops, rubs, regular rhythm  GI:  Abdomen soft, nontender, non-distended  Extremities: No edema, normal pulses  Integument:  No skin rashes or ulceration  Neurologic:  Patient is awake alert, responding to command, oriented to person, place and time    LABORATORY RESULTS:      CBC with diff:   Results from last 7 days   Lab Units 11/11/22  0513 11/10/22  0609 11/09/22  0708 11/08/22  0705 11/07/22  1600   WBC Thousand/uL 4 99 5 49 5 84   < > 8 24   HEMOGLOBIN g/dL 11 9 12 2 12 8   < > 13 3   HEMATOCRIT % 35 2 37 4 39 3   < > 40 7   MCV fL 87 90 89   < > 90   PLATELETS Thousands/uL 149 172 171   < > 166   MCH pg 29 5 29 4 28 9   < > 29 4   MCHC g/dL 33 8 32 6 32 6   < > 32 7   RDW % 13 9 14 1 14 1   < > 14 3   MPV fL 10 0 9 9 9 8   < > 10 0   NRBC AUTO /100 WBCs  --   --   --   --  0    < > = values in this interval not displayed         CMP:  Results from last 7 days   Lab Units 11/11/22  0513 11/10/22  0609 11/09/22  0708 11/08/22  0705 11/07/22  1600   POTASSIUM mmol/L 4 2 4 1 3 9   < > 3 5   CHLORIDE mmol/L 105 105 103   < > 103   CO2 mmol/L 28 32 29   < > 28   BUN mg/dL 22 21 26*   < > 26*   CREATININE mg/dL 1 25 1 36* 1 50*   < > 1 37*   CALCIUM mg/dL 9 0 8 9 9 0   < > 9 7   AST U/L  --   --   --   --  33   ALT U/L  --   --   --   --  21   ALK PHOS U/L  --   --   --   --  69   EGFR ml/min/1 73sq m 40 36 32   < > 35    < > = values in this interval not displayed  BMP:  Results from last 7 days   Lab Units 11/11/22  0513 11/10/22  0609 11/09/22  0708   POTASSIUM mmol/L 4 2 4 1 3 9   CHLORIDE mmol/L 105 105 103   CO2 mmol/L 28 32 29   BUN mg/dL 22 21 26*   CREATININE mg/dL 1 25 1 36* 1 50*   CALCIUM mg/dL 9 0 8 9 9 0         Results from last 7 days   Lab Units 11/07/22  1600   NT-PRO BNP pg/mL 1,040*      Results from last 7 days   Lab Units 11/08/22  0705 11/07/22  1600   MAGNESIUM mg/dL 2 3 2 3             Results from last 7 days   Lab Units 11/11/22  0513 11/10/22  0609 11/09/22  0708 11/08/22  0713 11/07/22  2112   INR  1 83* 2 38* 2 72* 2 40* 2 32*       Lipid Profile:   No results found for: CHOL  No results found for: HDL  No results found for: LDLCALC  No results found for: TRIG    Cardiac testing:   No results found for this or any previous visit  No results found for this or any previous visit  No results found for this or any previous visit  No valid procedures specified  No results found for this or any previous visit  Meds/Allergies   all current active meds have been reviewed  Medications Prior to Admission   Medication   • furosemide (LASIX) 20 mg tablet   • metoprolol succinate (TOPROL-XL) 25 mg 24 hr tablet   • warfarin (COUMADIN) 1 mg tablet       sodium chloride, 125 mL/hr          ** Please Note: Dragon 360 Dictation voice to text software may have been used in the creation of this document   **

## 2022-11-11 NOTE — ASSESSMENT & PLAN NOTE
· Pt presented to ED after an episode of weakness with associated dizziness and lightheadedness around 1pm followed by 5 episodes nbnb vomiting and diarrhea  · CT head: no acute intracranial abnormality  · CT A/P: cardiomegaly with biatrial dilation  · CXR: no evidence of vascular congestion   · Cardiology following  patient now agreeable to cardiac catheterization    INR is 1 83, potential Cardiac cath today or monday  · Started heparin  · Echo showed preserved ejection fraction of 55%, RV mildly dilated, biatrial dilation, bioprosthetic TAVR valve, and moderate pulmonary hypertension  · IV zofran

## 2022-11-11 NOTE — ASSESSMENT & PLAN NOTE
· Pt presented to ED after an episode of weakness with associated dizziness and lightheadedness around 1pm followed by 5 episodes nbnb vomiting and diarrhea  · CT head: no acute intracranial abnormality  · CT A/P: cardiomegaly with biatrial dilation  · CXR: no evidence of vascular congestion   · Cardiology following  patient now agreeable to cardiac catheterization      · Continue heparin while holding Coumadin  · Echo showed preserved ejection fraction of 55%, RV mildly dilated, biatrial dilation, bioprosthetic TAVR valve, and moderate pulmonary hypertension  · IV zofran

## 2022-11-11 NOTE — PLAN OF CARE
Problem: MOBILITY - ADULT  Goal: Maintain or return to baseline ADL function  Description: INTERVENTIONS:  -  Assess patient's ability to carry out ADLs; assess patient's baseline for ADL function and identify physical deficits which impact ability to perform ADLs (bathing, care of mouth/teeth, toileting, grooming, dressing, etc )  - Assess/evaluate cause of self-care deficits   - Assess range of motion  - Assess patient's mobility; develop plan if impaired  - Assess patient's need for assistive devices and provide as appropriate  - Encourage maximum independence but intervene and supervise when necessary  - Involve family in performance of ADLs  - Assess for home care needs following discharge   - Consider OT consult to assist with ADL evaluation and planning for discharge  - Provide patient education as appropriate  Outcome: Progressing     Problem: MOBILITY - ADULT  Goal: Maintains/Returns to pre admission functional level  Description: INTERVENTIONS:  - Perform BMAT or MOVE assessment daily    - Set and communicate daily mobility goal to care team and patient/family/caregiver  - Collaborate with rehabilitation services on mobility goals if consulted  - Perform Range of Motion 3 times a day  - Reposition patient every 3 hours    - Dangle patient 3 times a day  - Stand patient 3 times a day  - Ambulate patient 3 times a day  - Out of bed to chair 3 times a day   - Out of bed for meals 3 times a day  - Out of bed for toileting  - Record patient progress and toleration of activity level   Outcome: Progressing

## 2022-11-11 NOTE — ASSESSMENT & PLAN NOTE
Wt Readings from Last 3 Encounters:   11/11/22 70 8 kg (156 lb 1 4 oz)     Last echo 1/22: EF>70%,   · Echo results under weakness  · Monitor I/Os, daily weights  · Monitor electrolytes  · Continue metoprolol, torsemide 16-May-2022 22:27

## 2022-11-11 NOTE — PROGRESS NOTES
[facility]  Progress Note - Terri  1940, 80 y o  female MRN: [id number]  Unit/Bed#: -01 Encounter: [id number]  Primary Care Provider: No primary care provider on file  Date and time admitted to hospital: 11/7/2022  2:54 PM    * Weakness  Assessment & Plan  · Pt presented to ED after an episode of weakness with associated dizziness and lightheadedness around 1pm followed by 5 episodes nbnb vomiting and diarrhea  · CT head: no acute intracranial abnormality  · CT A/P: cardiomegaly with biatrial dilation  · CXR: no evidence of vascular congestion   · Cardiology following  patient now agreeable to cardiac catheterization    INR is 1 83, potential Cardiac cath today or monday  · Started heparin  · Echo showed preserved ejection fraction of 55%, RV mildly dilated, biatrial dilation, bioprosthetic TAVR valve, and moderate pulmonary hypertension  · IV zofran    Elevated troponin  Assessment & Plan  Likely in setting of intractable nausea and vomiting, continue to trend  tnl 0 hr: 63  tnl 2 hr: 132  · Cardiology following, read recommendations under weakness    Chronic diastolic heart failure (HCC)  Assessment & Plan  Wt Readings from Last 3 Encounters:   11/11/22 70 8 kg (156 lb 1 4 oz)     Last echo 1/22: EF>70%,   · Echo results under weakness  · Monitor I/Os, daily weights  · Monitor electrolytes  · Continue metoprolol, torsemide    Atrial fibrillation (HCC)  Assessment & Plan  · Continue rate control meds  · Coumadin being withheld due to potential cardiac catheterization tomorrow, continue to hold due to cardiac catheterization  · Started heparin    Hypertension  Assessment & Plan  · Continue home BP meds    CKD (chronic kidney disease)  Assessment & Plan  Lab Results   Component Value Date    EGFR 40 11/11/2022    EGFR 36 11/10/2022    EGFR 32 11/09/2022    CREATININE 1 25 11/11/2022    CREATININE 1 36 (H) 11/10/2022    CREATININE 1 50 (H) 11/09/2022     Cr at baseline, continue to monitor        VTE Pharmacologic Prophylaxis: VTE Score: 4 Moderate Risk (Score 3-4) - Pharmacological DVT Prophylaxis Ordered: heparin  Patient Centered Rounds: I performed bedside rounds with nursing staff today  Discussions with Specialists or Other Care Team Provider:  Cardiology    Education and Discussions with Family / Patient: Updated  () at bedside  Time Spent for Care: 30 minutes  More than 50% of total time spent on counseling and coordination of care as described above  Current Length of Stay: 3 day(s)  Current Patient Status: Inpatient   Certification Statement: The patient will continue to require additional inpatient hospital stay due to Cardiac catheterization  Discharge Plan: Anticipate discharge in 24-48 hrs to home  Code Status: Level 1 - Full Code    Subjective:   Patient continues to feel well  Is eager to get the cardiac catheterization completed  Would like to go home  Objective:     Vitals:   Temp (24hrs), Av °F (36 7 °C), Min:97 7 °F (36 5 °C), Max:98 2 °F (36 8 °C)    Temp:  [97 7 °F (36 5 °C)-98 2 °F (36 8 °C)] 97 7 °F (36 5 °C)  HR:  [69-91] 84  Resp:  [14-17] 14  BP: (132-146)/(71-83) 146/83  SpO2:  [91 %-94 %] 94 %  Body mass index is 30 48 kg/m²  Input and Output Summary (last 24 hours): Intake/Output Summary (Last 24 hours) at 2022 1006  Last data filed at 2022 0332  Gross per 24 hour   Intake 240 ml   Output 1050 ml   Net -810 ml       Physical Exam:   Physical Exam  Vitals and nursing note reviewed  Constitutional:       Appearance: She is normal weight  HENT:      Head: Normocephalic  Nose: Nose normal       Mouth/Throat:      Mouth: Mucous membranes are moist       Pharynx: Oropharynx is clear  Eyes:      General: No scleral icterus  Conjunctiva/sclera: Conjunctivae normal       Pupils: Pupils are equal, round, and reactive to light  Cardiovascular:      Rate and Rhythm: Normal rate   Rhythm irregular  Heart sounds: No murmur heard  No friction rub  No gallop  Pulmonary:      Effort: Pulmonary effort is normal  No respiratory distress  Breath sounds: Normal breath sounds  No stridor  No wheezing, rhonchi or rales  Abdominal:      General: Abdomen is flat  Palpations: Abdomen is soft  Musculoskeletal:         General: Normal range of motion  Cervical back: Normal range of motion and neck supple  Right lower leg: No edema  Left lower leg: No edema  Lymphadenopathy:      Cervical: No cervical adenopathy  Skin:     General: Skin is warm  Coloration: Skin is not jaundiced or pale  Findings: No bruising, erythema or lesion  Neurological:      General: No focal deficit present  Mental Status: She is alert and oriented to person, place, and time  Mental status is at baseline  Cranial Nerves: No cranial nerve deficit  Motor: No weakness  Psychiatric:         Mood and Affect: Mood normal          Behavior: Behavior normal          Thought Content: Thought content normal           Additional Data:     Labs:  Results from last 7 days   Lab Units 11/11/22 0513 11/08/22 0705 11/07/22  1600   WBC Thousand/uL 4 99   < > 8 24   HEMOGLOBIN g/dL 11 9   < > 13 3   HEMATOCRIT % 35 2   < > 40 7   PLATELETS Thousands/uL 149   < > 166   NEUTROS PCT %  --   --  78*   LYMPHS PCT %  --   --  13*   MONOS PCT %  --   --  6   EOS PCT %  --   --  1    < > = values in this interval not displayed       Results from last 7 days   Lab Units 11/11/22 0513 11/08/22 0705 11/07/22  1600   SODIUM mmol/L 141   < > 142   POTASSIUM mmol/L 4 2   < > 3 5   CHLORIDE mmol/L 105   < > 103   CO2 mmol/L 28   < > 28   BUN mg/dL 22   < > 26*   CREATININE mg/dL 1 25   < > 1 37*   ANION GAP mmol/L 8   < > 11   CALCIUM mg/dL 9 0   < > 9 7   ALBUMIN g/dL  --   --  4 1   TOTAL BILIRUBIN mg/dL  --   --  0 58   ALK PHOS U/L  --   --  69   ALT U/L  --   --  21   AST U/L  --   --  33 GLUCOSE RANDOM mg/dL 91   < > 129    < > = values in this interval not displayed  Results from last 7 days   Lab Units 11/11/22  0513   INR  1 83*             Results from last 7 days   Lab Units 11/07/22  2241   PROCALCITONIN ng/ml 0 06       Lines/Drains:  Invasive Devices  Report    Peripheral Intravenous Line  Duration           Peripheral IV 11/07/22 Left Antecubital 3 days                  Telemetry:  Telemetry Orders (From admission, onward)             48 Hour Telemetry Monitoring  Continuous x 48 hours        Expiring   References:    Telemetry Guidelines   Question:  Reason for 48 Hour Telemetry  Answer:  Arrhythmias Requiring Medical Therapy (eg  SVT, Vtach/fib, Bradycardia, Uncontrolled A-fib)                 Telemetry Reviewed:  Atrial fibrillation with average heart rate 60s to 70s  Indication for Continued Telemetry Use: Arrthymias requiring medical therapy             Imaging: Reviewed radiology reports from this admission including: chest xray, abdominal/pelvic CT and CT head    Recent Cultures (last 7 days):         Last 24 Hours Medication List:   Current Facility-Administered Medications   Medication Dose Route Frequency Provider Last Rate   • aspirin  81 mg Oral Daily Alesha Dwyer MD     • furosemide  20 mg Oral Daily Jose Enrique Ramirez PA-C     • heparin (porcine)  5,000 Units Subcutaneous [facility] [address] Jose Enrique Ramirez PA-C     • metoprolol succinate  25 mg Oral Daily Ha Neil PA-C     • ondansetron (FOR EMS ONLY)  1 each Does not apply Once Millie Dash DO          Today, Patient Was Seen By: Jose Enrique Ramirez    **Please Note: This note may have been constructed using a voice recognition system  **

## 2022-11-11 NOTE — CONSULTS
Consultation - Nephrology   Trisha Walker 80 y o  female MRN: [id number]  Unit/Bed#: -01 Encounter: [id number]    Referring PHYSICIAN:  [address] [place] [address] Pkwy:  CKD and need for IV contrast    DATE OF CONSULTATION:  November 11, 2022    ADMISSION DIAGNOSIS: Weakness     CHIEF COMPLAINT     Patient admitted the hospital after weakness and dizziness and was found to a abnormal troponin requiring cardiac catheterization    HPI     Patient id possible history of CKD based on past data    Came to the hospital with dizziness and lightheadedness and on admission was found to abnormal troponin    Patient does history of aortic wall surgery in the past also history of diastolic heart failure on diuretic  Patient overall seems to be quite comfortable  Just worried about dizziness    Denies chest pain palpitation or shortness of breath now    No nausea no vomiting    PAST MEDICAL HISTORY     History reviewed  No pertinent past medical history  PAST SURGICAL HISTORY     Past Surgical History:   Procedure Laterality Date   • CARDIAC SURGERY         ALLERGIES     No Known Allergies    SOCIAL HISTORY     Social History     Substance and Sexual Activity   Alcohol Use Yes   • Alcohol/week: 3 0 standard drinks   • Types: 3 Glasses of wine per week     Social History     Substance and Sexual Activity   Drug Use Never     Social History     Tobacco Use   Smoking Status Never Smoker   Smokeless Tobacco Never Used       FAMILY HISTORY     History reviewed  No pertinent family history      CURRENT MEDICATIONS       Current Facility-Administered Medications:   •  aspirin (ECOTRIN LOW STRENGTH) EC tablet 81 mg, 81 mg, Oral, Daily, Graham Angel MD, 81 mg at 11/11/22 1072  •  furosemide (LASIX) tablet 20 mg, 20 mg, Oral, Daily, Batshevadania Fitzgerald Prator, PA-C, 20 mg at 11/11/22 0927  •  heparin (porcine) subcutaneous injection 5,000 Units, 5,000 Units, Subcutaneous, Q8H [address], Batsheva Crooked Prator, PA-C, 5,000 Units at 11/11/22 1010  •  metoprolol succinate (TOPROL-XL) 24 hr tablet 25 mg, 25 mg, Oral, Daily, Valentino Patel PA-C, 25 mg at 11/11/22 5201  •  ondansetron (FOR EMS ONLY) (ZOFRAN) 4 mg/2 mL injection 4 mg, 1 each, Does not apply, Once, [employer], DO  •  sodium chloride 0 9 % infusion, 75 mL/hr, Intravenous, Continuous, NOE Cooley, Last Rate: 75 mL/hr at 11/11/22 1059, 75 mL/hr at 11/11/22 1059    REVIEW OF SYSTEMS     Review of Systems   Constitutional: Negative for activity change and fatigue  HENT: Negative for congestion and ear discharge  Eyes: Negative for photophobia and pain  Respiratory: Negative for apnea and choking  Cardiovascular: Negative for chest pain and palpitations  Gastrointestinal: Negative for abdominal distention and blood in stool  Endocrine: Negative for heat intolerance and polyphagia  Genitourinary: Negative for flank pain and urgency  Musculoskeletal: Negative for neck pain and neck stiffness  Skin: Negative for color change and wound  Allergic/Immunologic: Negative for food allergies and immunocompromised state  Neurological: Positive for weakness and light-headedness  Negative for seizures and facial asymmetry  Hematological: Negative for adenopathy  Does not bruise/bleed easily  Psychiatric/Behavioral: Negative for self-injury and suicidal ideas         LAB RESULTS        Results from last 7 days   Lab Units 11/11/22  0513 11/10/22  0609 11/09/22  0708 11/08/22  0705 11/07/22  1600   WBC Thousand/uL 4 99 5 49 5 84 7 95 8 24   HEMOGLOBIN g/dL 11 9 12 2 12 8 13 4 13 3   HEMATOCRIT % 35 2 37 4 39 3 40 0 40 7   PLATELETS Thousands/uL 149 172 171 194 166   POTASSIUM mmol/L 4 2 4 1 3 9 3 8 3 5   CHLORIDE mmol/L 105 105 103 101 103   CO2 mmol/L 28 32 29 32 28   BUN mg/dL 22 21 26* 24 26*   CREATININE mg/dL 1 25 1 36* 1 50* 1 60* 1 37*   EGFR ml/min/1 73sq m 40 36 32 29 35   CALCIUM mg/dL 9 0 8 9 9 0 9 4 9 7   MAGNESIUM mg/dL  --   --   --  2 3 2 3 I have personally reviewed the old medical records and patient's previously known baseline creatinine level is ~ 1 3    RADIOLOGY RESULTS     No results found for this or any previous visit  Results for orders placed during the hospital encounter of 11/07/22    XR chest 2 views    Narrative  CHEST    INDICATION:   dizziness  Weakness    COMPARISON:  None    EXAM PERFORMED/VIEWS:  XR CHEST PA & LATERAL  Images: 2    FINDINGS:    Cardiomediastinal silhouette appears enlarged  A median sternotomy has been performed  Prosthetic aortic valve  The lungs are clear  No pneumothorax or pleural effusion  Osseous structures appear within normal limits for patient age  Impression  No acute cardiopulmonary disease  Workstation performed: [id number]    No results found for this or any previous visit  No results found for this or any previous visit  No results found for this or any previous visit  No results found for this or any previous visit  OBJECTIVE     Current Weight: Weight - Scale: 70 8 kg (156 lb 1 4 oz)  Vitals:    11/11/22 1115   BP: 142/88   Pulse: 73   Resp:    Temp: 98 2 °F (36 8 °C)   SpO2: 92%       Intake/Output Summary (Last 24 hours) at 11/11/2022 1149  Last data filed at 11/11/2022 0033  Gross per 24 hour   Intake 240 ml   Output 1050 ml   Net -810 ml       PHYSICAL EXAMINATION     Physical Exam  Constitutional:       General: She is not in acute distress  Appearance: She is well-developed  HENT:      Head: Normocephalic  Eyes:      General: No scleral icterus  Conjunctiva/sclera: Conjunctivae normal    Neck:      Vascular: No JVD  Cardiovascular:      Rate and Rhythm: Normal rate  Heart sounds: Murmur heard  Pulmonary:      Effort: Pulmonary effort is normal       Breath sounds: Normal breath sounds  No wheezing  Abdominal:      General: Bowel sounds are normal       Palpations: Abdomen is soft  Tenderness:  There is no abdominal tenderness  Musculoskeletal:         General: Normal range of motion  Cervical back: Neck supple  Skin:     General: Skin is warm  Findings: No rash  Neurological:      Mental Status: She is alert and oriented to person, place, and time  Psychiatric:         Behavior: Behavior normal           PLAN / RECOMMENDATIONS      CKD stage 3:  Patient baseline creatinine is about 1 3 and seems to be stable at this point  Possibly hypertensive nephrosclerosis  Patient claims she was told about possible kidney disease when she had cardiac surgery done in 2013    Abnormal troponin:  Patient will require cardiac catheterization  As kidney function is stable at this point I think patient can a cardiac catheterization  Will provide IV fluid as part of the renal protection and monitor patient closely  Will also hold diuretic temporary    Everything discussed at length with the patient and cardiologist    Hypertension:  Reasonably well controlled    Atrial fibrillation:  Rate seems to be well control at this point        Thank you for the consultation to participate in patient's care  I have personally discussed my plan with the referring physician  Jeffrey Christy MD  Nephrology  11/11/2022        Portions of the record may have been created with voice recognition software  Occasional wrong word or "sound a like" substitutions may have occurred due to the inherent limitations of voice recognition software  Read the chart carefully and recognize, using context, where substitutions have occurred

## 2022-11-11 NOTE — CASE MANAGEMENT
Case Management Discharge Planning Note    Patient name Waleska Bermudez  Location [facility] 417/-[id number] MRN [id number]  : 1940 Date 2022       Current Admission Date: 2022  Current Admission Diagnosis:Weakness   Patient Active Problem List    Diagnosis Date Noted   • Atrial fibrillation ([facility] ) 2022   • Hypertension 2022   • Weakness 2022   • Chronic diastolic heart failure ([facility] ) 2022   • Elevated troponin 2022   • CKD (chronic kidney disease) 2022      LOS (days): 3  Geometric Mean LOS (GMLOS) (days): 2 50  Days to GMLOS:-0 3     OBJECTIVE:  Risk of Unplanned Readmission Score: 9 58      Current admission status: Inpatient   Preferred Pharmacy:   [facility] DR ALEK Pope [address] [place] [address]  [address]  Phone: [phone] Fax: [phone]    Primary Care Provider: No primary care provider on file  Primary Insurance: MEDICARE  Secondary Insurance: [address] [place] [address],[facility] SHIELD    DISCHARGE DETAILS:     IMM Given (Date):: 22  IMM Given to[de-identified] Patient     Additional Comments: Per the medical team, the patient will be medically ready for discharge pending the Cardiac Cath results  The CM explained and provided a copy of the IMM  The patients  will provide transportation home at discharge

## 2022-11-11 NOTE — ASSESSMENT & PLAN NOTE
Lab Results   Component Value Date    EGFR 40 11/11/2022    EGFR 36 11/10/2022    EGFR 32 11/09/2022    CREATININE 1 25 11/11/2022    CREATININE 1 36 (H) 11/10/2022    CREATININE 1 50 (H) 11/09/2022     Cr at baseline, continue to monitor

## 2022-11-11 NOTE — PLAN OF CARE
Problem: Potential for Falls  Goal: Patient will remain free of falls  Description: INTERVENTIONS:  -  Assess patient's ability to carry out ADLs; assess patient's baseline for ADL function and identify physical deficits which impact ability to perform ADLs (bathing, care of mouth/teeth, toileting, grooming, dressing, etc )  - Assess/evaluate cause of self-care deficits   - Assess range of motion  - Assess patient's mobility; develop plan if impaired  - Assess patient's need for assistive devices and provide as appropriate  - Encourage maximum independence but intervene and supervise when necessary  - Involve family in performance of ADLs  - Assess for home care needs following discharge   - Consider OT consult to assist with ADL evaluation and planning for discharge  - Provide patient education as appropriate  Outcome: Progressing     Problem: INFECTION - ADULT  Goal: Absence or prevention of progression during hospitalization  Description: INTERVENTIONS:  - Assess and monitor for signs and symptoms of infection  - Monitor lab/diagnostic results  - Monitor all insertion sites, i e  indwelling lines, tubes, and drains  - Monitor endotracheal if appropriate and nasal secretions for changes in amount and color  - [place] appropriate cooling/warming therapies per order  - Administer medications as ordered  - Instruct and encourage patient and family to use good hand hygiene technique  - Identify and instruct in appropriate isolation precautions for identified infection/condition  Outcome: Progressing     Problem: DISCHARGE PLANNING  Goal: Discharge to home or other facility with appropriate resources  Description: INTERVENTIONS:  - Identify barriers to discharge w/patient and caregiver  - Arrange for needed discharge resources and transportation as appropriate  - Identify discharge learning needs (meds, wound care, etc )  - Arrange for interpretive services to assist at discharge as needed  - Refer to Case Management Department for coordinating discharge planning if the patient needs post-hospital services based on physician/advanced practitioner order or complex needs related to functional status, cognitive ability, or social support system  Outcome: Progressing

## 2022-11-11 NOTE — ASSESSMENT & PLAN NOTE
· Continue rate control meds  · Coumadin being withheld due to potential cardiac catheterization tomorrow, continue to hold due to cardiac catheterization  · Started heparin

## 2022-11-12 LAB
ANION GAP SERPL CALCULATED.3IONS-SCNC: 6 MMOL/L (ref 4–13)
ANION GAP SERPL CALCULATED.3IONS-SCNC: 9 MMOL/L (ref 4–13)
APTT PPP: 191 SECONDS (ref 23–37)
APTT PPP: 83 SECONDS (ref 23–37)
APTT PPP: >210 SECONDS (ref 23–37)
BASOPHILS # BLD AUTO: 0.05 THOUSANDS/ÂΜL (ref 0–0.1)
BASOPHILS # BLD AUTO: 0.06 THOUSANDS/ÂΜL (ref 0–0.1)
BASOPHILS NFR BLD AUTO: 1 % (ref 0–1)
BASOPHILS NFR BLD AUTO: 1 % (ref 0–1)
BUN SERPL-MCNC: 16 MG/DL (ref 5–25)
BUN SERPL-MCNC: 18 MG/DL (ref 5–25)
CALCIUM SERPL-MCNC: 9.1 MG/DL (ref 8.3–10.1)
CALCIUM SERPL-MCNC: 9.3 MG/DL (ref 8.3–10.1)
CHLORIDE SERPL-SCNC: 105 MMOL/L (ref 96–108)
CHLORIDE SERPL-SCNC: 108 MMOL/L (ref 96–108)
CO2 SERPL-SCNC: 25 MMOL/L (ref 21–32)
CO2 SERPL-SCNC: 28 MMOL/L (ref 21–32)
CREAT SERPL-MCNC: 1.13 MG/DL (ref 0.6–1.3)
CREAT SERPL-MCNC: 1.13 MG/DL (ref 0.6–1.3)
EOSINOPHIL # BLD AUTO: 0.27 THOUSAND/ÂΜL (ref 0–0.61)
EOSINOPHIL # BLD AUTO: 0.28 THOUSAND/ÂΜL (ref 0–0.61)
EOSINOPHIL NFR BLD AUTO: 5 % (ref 0–6)
EOSINOPHIL NFR BLD AUTO: 5 % (ref 0–6)
ERYTHROCYTE [DISTWIDTH] IN BLOOD BY AUTOMATED COUNT: 14.2 % (ref 11.6–15.1)
ERYTHROCYTE [DISTWIDTH] IN BLOOD BY AUTOMATED COUNT: 14.4 % (ref 11.6–15.1)
GFR SERPL CREATININE-BSD FRML MDRD: 45 ML/MIN/1.73SQ M
GFR SERPL CREATININE-BSD FRML MDRD: 45 ML/MIN/1.73SQ M
GLUCOSE SERPL-MCNC: 121 MG/DL (ref 65–140)
GLUCOSE SERPL-MCNC: 91 MG/DL (ref 65–140)
HCT VFR BLD AUTO: 35.8 % (ref 34.8–46.1)
HCT VFR BLD AUTO: 39.4 % (ref 34.8–46.1)
HGB BLD-MCNC: 11.8 G/DL (ref 11.5–15.4)
HGB BLD-MCNC: 12.8 G/DL (ref 11.5–15.4)
IMM GRANULOCYTES # BLD AUTO: 0.01 THOUSAND/UL (ref 0–0.2)
IMM GRANULOCYTES # BLD AUTO: 0.02 THOUSAND/UL (ref 0–0.2)
IMM GRANULOCYTES NFR BLD AUTO: 0 % (ref 0–2)
IMM GRANULOCYTES NFR BLD AUTO: 0 % (ref 0–2)
INR PPP: 1.82 (ref 0.84–1.19)
INR PPP: 2.11 (ref 0.84–1.19)
LYMPHOCYTES # BLD AUTO: 1.29 THOUSANDS/ÂΜL (ref 0.6–4.47)
LYMPHOCYTES # BLD AUTO: 1.61 THOUSANDS/ÂΜL (ref 0.6–4.47)
LYMPHOCYTES NFR BLD AUTO: 22 % (ref 14–44)
LYMPHOCYTES NFR BLD AUTO: 29 % (ref 14–44)
MCH RBC QN AUTO: 28.8 PG (ref 26.8–34.3)
MCH RBC QN AUTO: 29.1 PG (ref 26.8–34.3)
MCHC RBC AUTO-ENTMCNC: 32.5 G/DL (ref 31.4–37.4)
MCHC RBC AUTO-ENTMCNC: 33 G/DL (ref 31.4–37.4)
MCV RBC AUTO: 88 FL (ref 82–98)
MCV RBC AUTO: 89 FL (ref 82–98)
MONOCYTES # BLD AUTO: 0.48 THOUSAND/ÂΜL (ref 0.17–1.22)
MONOCYTES # BLD AUTO: 0.54 THOUSAND/ÂΜL (ref 0.17–1.22)
MONOCYTES NFR BLD AUTO: 10 % (ref 4–12)
MONOCYTES NFR BLD AUTO: 8 % (ref 4–12)
NEUTROPHILS # BLD AUTO: 3.05 THOUSANDS/ÂΜL (ref 1.85–7.62)
NEUTROPHILS # BLD AUTO: 3.74 THOUSANDS/ÂΜL (ref 1.85–7.62)
NEUTS SEG NFR BLD AUTO: 55 % (ref 43–75)
NEUTS SEG NFR BLD AUTO: 64 % (ref 43–75)
NRBC BLD AUTO-RTO: 0 /100 WBCS
NRBC BLD AUTO-RTO: 0 /100 WBCS
PLATELET # BLD AUTO: 154 THOUSANDS/UL (ref 149–390)
PLATELET # BLD AUTO: 183 THOUSANDS/UL (ref 149–390)
PMV BLD AUTO: 9.8 FL (ref 8.9–12.7)
PMV BLD AUTO: 9.9 FL (ref 8.9–12.7)
POTASSIUM SERPL-SCNC: 4 MMOL/L (ref 3.5–5.3)
POTASSIUM SERPL-SCNC: 4 MMOL/L (ref 3.5–5.3)
PROTHROMBIN TIME: 20.7 SECONDS (ref 11.6–14.5)
PROTHROMBIN TIME: 23.2 SECONDS (ref 11.6–14.5)
RBC # BLD AUTO: 4.05 MILLION/UL (ref 3.81–5.12)
RBC # BLD AUTO: 4.45 MILLION/UL (ref 3.81–5.12)
SODIUM SERPL-SCNC: 139 MMOL/L (ref 135–147)
SODIUM SERPL-SCNC: 142 MMOL/L (ref 135–147)
WBC # BLD AUTO: 5.54 THOUSAND/UL (ref 4.31–10.16)
WBC # BLD AUTO: 5.86 THOUSAND/UL (ref 4.31–10.16)

## 2022-11-12 RX ORDER — HYDRALAZINE HYDROCHLORIDE 20 MG/ML
5 INJECTION INTRAMUSCULAR; INTRAVENOUS ONCE
Status: COMPLETED | OUTPATIENT
Start: 2022-11-12 | End: 2022-11-12

## 2022-11-12 RX ADMIN — METOPROLOL SUCCINATE 25 MG: 25 TABLET, EXTENDED RELEASE ORAL at 08:25

## 2022-11-12 RX ADMIN — HEPARIN SODIUM 9 UNITS/KG/HR: 10000 INJECTION, SOLUTION INTRAVENOUS at 11:58

## 2022-11-12 RX ADMIN — ASPIRIN 81 MG: 81 TABLET, COATED ORAL at 08:25

## 2022-11-12 RX ADMIN — HYDRALAZINE HYDROCHLORIDE 5 MG: 20 INJECTION INTRAMUSCULAR; INTRAVENOUS at 23:56

## 2022-11-12 NOTE — PROGRESS NOTES
NEPHROLOGY PROGRESS NOTE    Patient: Kayden Hu               Sex: female          DOA: 11/7/2022  2:54 PM   YOB: 1940        Age:  80 y o         LOS:  LOS: 4 days       HPI     Patient with CKD requiring cardiac catheterization    SUBJECTIVE     Patient overall seems to be stable    Denies any acute complaint    No chest pain no palpitation    CURRENT MEDICATIONS       Current Facility-Administered Medications:   •  aspirin (ECOTRIN LOW STRENGTH) EC tablet 81 mg, 81 mg, Oral, Daily, Alesha Dwyer MD, 81 mg at 11/12/22 0825  •  heparin (porcine) 25,000 units in 0 45% NaCl 250 mL infusion (premix), 3-30 Units/kg/hr (Order-Specific), Intravenous, Titrated, NOE Cooley, Stopped at 11/12/22 1109  •  heparin (porcine) injection 2,800 Units, 2,800 Units, Intravenous, Q1H PRN, NOE Cooley  •  heparin (porcine) injection 5,600 Units, 5,600 Units, Intravenous, Q1H PRN, NOE Cooley  •  metoprolol succinate (TOPROL-XL) 24 hr tablet 25 mg, 25 mg, Oral, Daily, Ha Neil PA-C, 25 mg at 11/12/22 0825    OBJECTIVE     Current Weight: Weight - Scale: 69 7 kg (153 lb 10 6 oz)  Vitals:    11/12/22 0716   BP: (!) 168/101   Pulse: 87   Resp: 16   Temp: 97 8 °F (36 6 °C)   SpO2: 96%       Intake/Output Summary (Last 24 hours) at 11/12/2022 1134  Last data filed at 11/12/2022 0720  Gross per 24 hour   Intake 1047 63 ml   Output 400 ml   Net 647 63 ml       PHYSICAL EXAMINATION     Physical Exam  Constitutional:       General: She is not in acute distress  Appearance: She is well-developed  HENT:      Head: Normocephalic  Eyes:      General: No scleral icterus  Conjunctiva/sclera: Conjunctivae normal    Neck:      Vascular: No JVD  Cardiovascular:      Rate and Rhythm: Normal rate  Heart sounds: Normal heart sounds  Pulmonary:      Effort: Pulmonary effort is normal       Breath sounds: No wheezing  Abdominal:      Palpations: Abdomen is soft  Tenderness: There is no abdominal tenderness  Musculoskeletal:         General: Normal range of motion  Cervical back: Neck supple  Skin:     General: Skin is warm  Findings: No rash  Neurological:      Mental Status: She is alert and oriented to person, place, and time  Psychiatric:         Behavior: Behavior normal           LAB RESULTS     Results from last 7 days   Lab Units 11/12/22  1042 11/12/22  0358 11/11/22  0513 11/10/22  0609 11/09/22  0708 11/08/22  0705 11/07/22  1600   WBC Thousand/uL 5 86 5 54 4 99 5 49 5 84 7 95 8 24   HEMOGLOBIN g/dL 12 8 11 8 11 9 12 2 12 8 13 4 13 3   HEMATOCRIT % 39 4 35 8 35 2 37 4 39 3 40 0 40 7   PLATELETS Thousands/uL 183 154 149 172 171 194 166   POTASSIUM mmol/L 4 0 4 0 4 2 4 1 3 9 3 8 3 5   CHLORIDE mmol/L 105 108 105 105 103 101 103   CO2 mmol/L 25 28 28 32 29 32 28   BUN mg/dL 16 18 22 21 26* 24 26*   CREATININE mg/dL 1 13 1 13 1 25 1 36* 1 50* 1 60* 1 37*   EGFR ml/min/1 73sq m 45 45 40 36 32 29 35   CALCIUM mg/dL 9 3 9 1 9 0 8 9 9 0 9 4 9 7   MAGNESIUM mg/dL  --   --   --   --   --  2 3 2 3       RADIOLOGY RESULTS      No results found for this or any previous visit  Results for orders placed during the hospital encounter of 11/07/22    XR chest 2 views    Narrative  CHEST    INDICATION:   dizziness  Weakness    COMPARISON:  None    EXAM PERFORMED/VIEWS:  XR CHEST PA & LATERAL  Images: 2    FINDINGS:    Cardiomediastinal silhouette appears enlarged  A median sternotomy has been performed  Prosthetic aortic valve  The lungs are clear  No pneumothorax or pleural effusion  Osseous structures appear within normal limits for patient age  Impression  No acute cardiopulmonary disease  Workstation performed: [id number]    No results found for this or any previous visit  No results found for this or any previous visit  No results found for this or any previous visit  No results found for this or any previous visit        PLAN / RECOMMENDATIONS      CKD stage 3:  Seems to be stable    Coronary artery disease:  Need for cardiac catheterization which could not be done because of abnormal quite profile  Will provide IV fluid when it is being done    Hypertension:  Fluctuating but overall stable    Will follow    Bryan Sosa MD  Nephrology  11/12/2022        Portions of the record may have been created with voice recognition software  Occasional wrong word or "sound a like" substitutions may have occurred due to the inherent limitations of voice recognition software  Read the chart carefully and recognize, using context, where substitutions have occurred

## 2022-11-12 NOTE — PROGRESS NOTES
General Cardiology   Progress Note   Kayden Hu 80 y o  female MRN: [id number]  Unit/Bed#: -01 Encounter: [id number]    Assessment/Plan:    1  Elevated troponin  -troponin with a peak of 425  -symptoms suspicious for angina  -TTE performed which revealed normal systolic function with an EF 55%, moderate tricuspid regurgitation, moderate pulmonary hypertension, and bioprosthetic aortic valve noted  -plan for coronary angiography on Monday if  INR stays below 1 8 (repeat INRs yesterday noted to be 1 9 and 2 1)  -nephrology on board  -telemetry monitoring     2  Chronic atrial fibrillation  -heart rates currently controlled in the 80s  -continue metoprolol succinate for rate control  -warfarin currently on hold, INR 1 8 today, continue with heparin drip for anticoagulation     3  Hypertension  -blood pressure is overall well controlled  -continue metoprolol succinate     4  Aortic stenosis s/p TAVR  -noted on TTE with no evidence of regurgitation or significant stenosis     5  Acute kidney injury on CKD sta ge III  -baseline creatinine 1 3  -nephrology following for coronary angiography on Monday      Subjective:   Patient seen and examined  No significant events since the last encounter       REVIEW OF SYSTEMS:  Constitutional:  Denies fever or chills   Eyes:  Denies change in visual acuity   HENT:  Denies nasal congestion or sore throat   Respiratory:  Denies cough, orthopnea, PND or shortness of breath   Cardiovascular:  Denies chest pain, palpitations or edema   GI:  Denies abdominal pain, nausea, vomiting, bloody stools, constipation or diarrhea   :  Denies dysuria, frequency, difficulty in urination or nocturia  Musculoskeletal:  Denies back pain or joint pain   Neurologic:  Denies headache, focal weakness or sensory changes   Endocrine:  Denies polyuria or polydipsia   Lymphatic:  Denies swollen glands   Psychiatric:  Denies depression or anxiety     Objective:   Vitals:  Vitals:    11/12/22 0716 BP: (!) 168/101   Pulse: 87   Resp: 16   Temp: 97 8 °F (36 6 °C)   SpO2: 96%       Body mass index is 30 01 kg/m²  Systolic (99GOQ), ZZL:297 , Min:143 , GJW:275     Diastolic (26DMS), TJJ:22, Min:85, Max:101      Intake/Output Summary (Last 24 hours) at 11/12/2022 1209  Last data filed at 11/12/2022 0720  Gross per 24 hour   Intake 1047 63 ml   Output 400 ml   Net 647 63 ml     Weight (last 2 days)     Date/Time Weight    11/12/22 07:16:59 69 7 (153 66)    11/11/22 0536 70 8 (156 09)    11/11/22 0500 70 8 (156 09)    11/10/22 0600 69 8 (153 88)          Telemetry Review: No significant arrhythmias seen on telemetry review  PHYSICAL EXAMS  General:  Patient is not in acute distress, laying in the bed comfortably, awake  Head: Normocephalic, Atraumatic  HEENT: White sclera, pink conjunctiva  Neck:  Supple, no neck vein distention  Respiratory: clear to auscultation   Cardiovascular: S1-S2 normal, no murmurs, thrills, gallops, rubs, regular rhythm  GI:  Abdomen soft, nontender, non-distended  Extremities: No edema, normal pulses  Integument:  No skin rashes or ulceration  Neurologic:  Patient is awake alert, responding to command, oriented to person, place and time    LABORATORY RESULTS:      CBC with diff:   Results from last 7 days   Lab Units 11/12/22  1042 11/12/22  0358 11/11/22  0513 11/08/22  0705 11/07/22  1600   WBC Thousand/uL 5 86 5 54 4 99   < > 8 24   HEMOGLOBIN g/dL 12 8 11 8 11 9   < > 13 3   HEMATOCRIT % 39 4 35 8 35 2   < > 40 7   MCV fL 89 88 87   < > 90   PLATELETS Thousands/uL 183 154 149   < > 166   MCH pg 28 8 29 1 29 5   < > 29 4   MCHC g/dL 32 5 33 0 33 8   < > 32 7   RDW % 14 4 14 2 13 9   < > 14 3   MPV fL 9 9 9 8 10 0   < > 10 0   NRBC AUTO /100 WBCs 0 0  --   --  0    < > = values in this interval not displayed         CMP:  Results from last 7 days   Lab Units 11/12/22  1042 11/12/22  0358 11/11/22  0513 11/08/22  0705 11/07/22  1600   POTASSIUM mmol/L 4 0 4 0 4 2   < > 3 5 CHLORIDE mmol/L 105 108 105   < > 103   CO2 mmol/L 25 28 28   < > 28   BUN mg/dL 16 18 22   < > 26*   CREATININE mg/dL 1 13 1 13 1 25   < > 1 37*   CALCIUM mg/dL 9 3 9 1 9 0   < > 9 7   AST U/L  --   --   --   --  33   ALT U/L  --   --   --   --  21   ALK PHOS U/L  --   --   --   --  69   EGFR ml/min/1 73sq m 45 45 40   < > 35    < > = values in this interval not displayed  BMP:  Results from last 7 days   Lab Units 11/12/22  1042 11/12/22  0358 11/11/22  0513   POTASSIUM mmol/L 4 0 4 0 4 2   CHLORIDE mmol/L 105 108 105   CO2 mmol/L 25 28 28   BUN mg/dL 16 18 22   CREATININE mg/dL 1 13 1 13 1 25   CALCIUM mg/dL 9 3 9 1 9 0         Results from last 7 days   Lab Units 11/07/22  1600   NT-PRO BNP pg/mL 1,040*      Results from last 7 days   Lab Units 11/08/22  0705 11/07/22  1600   MAGNESIUM mg/dL 2 3 2 3             Results from last 7 days   Lab Units 11/12/22  1042 11/12/22  0353 11/11/22  1245 11/11/22  0513 11/10/22  0609 11/09/22  0708 11/08/22  0713   INR  1 82* 2 11* 1 95* 1 83* 2 38* 2 72* 2 40*       Lipid Profile:   No results found for: CHOL  No results found for: HDL  No results found for: LDLCALC  No results found for: TRIG    Cardiac testing:   No results found for this or any previous visit  No results found for this or any previous visit  No results found for this or any previous visit  No valid procedures specified  No results found for this or any previous visit  Meds/Allergies   all current active meds have been reviewed  Medications Prior to Admission   Medication   • furosemide (LASIX) 20 mg tablet   • metoprolol succinate (TOPROL-XL) 25 mg 24 hr tablet   • warfarin (COUMADIN) 1 mg tablet       heparin (porcine), 3-30 Units/kg/hr (Order-Specific), Last Rate: 9 Units/kg/hr (11/12/22 1158)          ** Please Note: Dragon 360 Dictation voice to text software may have been used in the creation of this document   **

## 2022-11-12 NOTE — PROGRESS NOTES
[facility]  Progress Note - Addi Sanders 1940, 80 y o  female MRN: [id number]  Unit/Bed#: -01 Encounter: [id number]  Primary Care Provider: No primary care provider on file  Date and time admitted to hospital: 11/7/2022  2:54 PM    * Weakness  Assessment & Plan  · Pt presented to ED after an episode of weakness with associated dizziness and lightheadedness around 1pm followed by 5 episodes nbnb vomiting and diarrhea  · CT head: no acute intracranial abnormality  · CT A/P: cardiomegaly with biatrial dilation  · CXR: no evidence of vascular congestion   · Cardiology following  patient now agreeable to cardiac catheterization  · Continue heparin while holding Coumadin  · Echo showed preserved ejection fraction of 55%, RV mildly dilated, biatrial dilation, bioprosthetic TAVR valve, and moderate pulmonary hypertension  · IV zofran    CKD (chronic kidney disease)  Assessment & Plan  Lab Results   Component Value Date    EGFR 45 11/12/2022    EGFR 45 11/12/2022    EGFR 40 11/11/2022    CREATININE 1 13 11/12/2022    CREATININE 1 13 11/12/2022    CREATININE 1 25 11/11/2022     · Cr at baseline, continue to monitor  · Nephrology on board for renal optimization prior to cardiac catheterization    Elevated troponin  Assessment & Plan  · Concerning for angina  · Cardiology on board, appreciate recommendations  · Plan for cardiac catheterization on Monday    Awaiting INR to be below 1 8    Chronic diastolic heart failure (HCC)  Assessment & Plan  Wt Readings from Last 3 Encounters:   11/12/22 69 7 kg (153 lb 10 6 oz)     Last echo 1/22: EF>70%,   · Echo results under weakness  · Monitor I/Os, daily weights  · Monitor electrolytes  · Continue metoprolol  · Diuretics on hold in anticipation of cardiac catheterization on Monday    Hypertension  Assessment & Plan  · Continue home BP meds      Atrial fibrillation ([facility] [address] )  Assessment & Plan  · Continue rate control meds  · Coumadin being withheld due to potential cardiac catheterization tomorrow, continue to hold due to cardiac catheterization  · Started heparin        VTE Pharmacologic Prophylaxis:   Pharmacologic: Heparin Drip  Mechanical VTE Prophylaxis in Place: Yes    Review of Systems:    Review of Systems   Constitutional: Negative for chills and fever  HENT: Negative for ear pain and sore throat  Eyes: Negative for pain and visual disturbance  Respiratory: Negative for cough and shortness of breath  Cardiovascular: Negative for chest pain and palpitations  Gastrointestinal: Negative for abdominal pain and vomiting  Genitourinary: Negative for dysuria and hematuria  Musculoskeletal: Negative for arthralgias and back pain  Skin: Negative for color change and rash  Neurological: Negative for seizures and syncope  All other systems reviewed and are negative  Past Medical and Surgical History:     History reviewed  No pertinent past medical history  Past Surgical History:   Procedure Laterality Date   • CARDIAC SURGERY         Patient Centered Rounds: I have performed bedside rounds with nursing staff today  Discussions with Specialists or Other Care Team Provider:  Nursing, nephrology    Education and Discussions with Family / Patient:  Patient and family at bedside    Time Spent for Care: 45 minutes  More than 50% of total time spent on counseling and coordination of care as described above      Current Length of Stay: 4 day(s)    Current Patient Status: Inpatient   Certification Statement: The patient will continue to require additional inpatient hospital stay due to Plan for cardiac catheterization on Monday    Discharge Plan:  Likely Monday pending cardiac catheterization    Code Status: Level 1 - Full Code      Subjective:   Denies any complaints    Objective:     Vitals:   Temp (24hrs), Av 9 °F (36 6 °C), Min:97 8 °F (36 6 °C), Max:98 1 °F (36 7 °C)    Temp:  [97 8 °F (36 6 °C)-98 1 °F (36 7 °C)] 98 1 °F (36 7 °C)  HR:  [78-89] 78  Resp:  [16-19] 19  BP: (143-168)/() 161/90  SpO2:  [94 %-96 %] 95 %  Body mass index is 30 01 kg/m²  Input and Output Summary (last 24 hours): Intake/Output Summary (Last 24 hours) at 11/12/2022 1608  Last data filed at 11/12/2022 1444  Gross per 24 hour   Intake 1047 63 ml   Output 400 ml   Net 647 63 ml       Physical Exam:     Physical Exam  Vitals and nursing note reviewed  Constitutional:       General: She is not in acute distress  Appearance: She is well-developed  She is not ill-appearing or diaphoretic  HENT:      Head: Normocephalic and atraumatic  Mouth/Throat:      Mouth: Mucous membranes are moist       Pharynx: Oropharynx is clear  Eyes:      General: No scleral icterus  Conjunctiva/sclera: Conjunctivae normal    Cardiovascular:      Rate and Rhythm: Normal rate and regular rhythm  Heart sounds: No murmur heard  Pulmonary:      Effort: Pulmonary effort is normal  No respiratory distress  Breath sounds: Normal breath sounds  No wheezing or rales  Abdominal:      General: Bowel sounds are normal       Palpations: Abdomen is soft  Tenderness: There is no abdominal tenderness  Musculoskeletal:         General: Normal range of motion  Cervical back: Normal range of motion and neck supple  Right lower leg: No edema  Left lower leg: No edema  Skin:     General: Skin is warm and dry  Neurological:      General: No focal deficit present  Mental Status: She is alert and oriented to person, place, and time     Psychiatric:         Mood and Affect: Mood normal          Behavior: Behavior normal            Additional Data:     Labs:    Results from last 7 days   Lab Units 11/12/22  1042   WBC Thousand/uL 5 86   HEMOGLOBIN g/dL 12 8   HEMATOCRIT % 39 4   PLATELETS Thousands/uL 183   NEUTROS PCT % 64   LYMPHS PCT % 22   MONOS PCT % 8   EOS PCT % 5     Results from last 7 days   Lab Units 11/12/22  1042 11/08/22  0705 11/07/22  1600   SODIUM mmol/L 139   < > 142   POTASSIUM mmol/L 4 0   < > 3 5   CHLORIDE mmol/L 105   < > 103   CO2 mmol/L 25   < > 28   BUN mg/dL 16   < > 26*   CREATININE mg/dL 1 13   < > 1 37*   ANION GAP mmol/L 9   < > 11   CALCIUM mg/dL 9 3   < > 9 7   ALBUMIN g/dL  --   --  4 1   TOTAL BILIRUBIN mg/dL  --   --  0 58   ALK PHOS U/L  --   --  69   ALT U/L  --   --  21   AST U/L  --   --  33   GLUCOSE RANDOM mg/dL 121   < > 129    < > = values in this interval not displayed  Results from last 7 days   Lab Units 11/12/22  1042   INR  1 82*             Results from last 7 days   Lab Units 11/07/22  2241   PROCALCITONIN ng/ml 0 06           * I Have Reviewed All Lab Data Listed Above  * Additional Pertinent Lab Tests Reviewed: Trevor 66 Admission Reviewed    Imaging:    Imaging Reports Reviewed Today Include:  None  Imaging Personally Reviewed by Myself Includes:  None    Recent Cultures (last 7 days):           Last 24 Hours Medication List:   Current Facility-Administered Medications   Medication Dose Route Frequency Provider Last Rate   • aspirin  81 mg Oral Daily Alesha Dwyer MD     • heparin (porcine)  3-30 Units/kg/hr (Order-Specific) Intravenous Titrated Corrie A Maryjane Hodgkin, CRNP 9 Units/kg/hr (11/12/22 1158)   • heparin (porcine)  2,800 Units Intravenous Q1H PRN NOE Cooley     • heparin (porcine)  5,600 Units Intravenous Q1H PRN NOE Cooley     • metoprolol succinate  25 mg Oral Daily Ha Neil PA-C          Today, Patient Was Seen By: Tiago Dumont MD    ** Please Note: Dictation voice to text software may have been used in the creation of this document   **

## 2022-11-12 NOTE — ASSESSMENT & PLAN NOTE
· Concerning for angina  · Cardiology on board, appreciate recommendations  · Plan for cardiac catheterization on Monday    Awaiting INR to be below 1 8

## 2022-11-12 NOTE — ASSESSMENT & PLAN NOTE
Wt Readings from Last 3 Encounters:   11/12/22 69 7 kg (153 lb 10 6 oz)     Last echo 1/22: EF>70%,   · Echo results under weakness  · Monitor I/Os, daily weights  · Monitor electrolytes  · Continue metoprolol  · Diuretics on hold in anticipation of cardiac catheterization on Monday

## 2022-11-12 NOTE — ASSESSMENT & PLAN NOTE
Lab Results   Component Value Date    EGFR 45 11/12/2022    EGFR 45 11/12/2022    EGFR 40 11/11/2022    CREATININE 1 13 11/12/2022    CREATININE 1 13 11/12/2022    CREATININE 1 25 11/11/2022     · Cr at baseline, continue to monitor  · Nephrology on board for renal optimization prior to cardiac catheterization

## 2022-11-12 NOTE — PLAN OF CARE
Problem: INFECTION - ADULT  Goal: Absence or prevention of progression during hospitalization  Description: INTERVENTIONS:  - Assess and monitor for signs and symptoms of infection  - Monitor lab/diagnostic results  - Monitor all insertion sites, i e  indwelling lines, tubes, and drains  - Monitor endotracheal if appropriate and nasal secretions for changes in amount and color  - Sylva appropriate cooling/warming therapies per order  - Administer medications as ordered  - Instruct and encourage patient and family to use good hand hygiene technique  - Identify and instruct in appropriate isolation precautions for identified infection/condition  Outcome: Progressing

## 2022-11-12 NOTE — PLAN OF CARE
Problem: CARDIOVASCULAR - ADULT  Goal: Maintains optimal cardiac output and hemodynamic stability  Description: INTERVENTIONS:  - Monitor I/O, vital signs and rhythm  - Monitor for S/S and trends of decreased cardiac output  - Administer and titrate ordered vasoactive medications to optimize hemodynamic stability  - Assess quality of pulses, skin color and temperature  - Assess for signs of decreased coronary artery perfusion  - Instruct patient to report change in severity of symptoms  Outcome: Progressing     Problem: METABOLIC, FLUID AND ELECTROLYTES - ADULT  Goal: Fluid balance maintained  Description: INTERVENTIONS:  - Monitor labs   - Monitor I/O and WT  - Instruct patient on fluid and nutrition as appropriate  - Assess for signs & symptoms of volume excess or deficit  Outcome: Progressing     Problem: HEMATOLOGIC - ADULT  Goal: Maintains hematologic stability  Description: INTERVENTIONS  - Assess for signs and symptoms of bleeding or hemorrhage  - Monitor labs  - Administer supportive blood products/factors as ordered and appropriate  Outcome: Progressing

## 2022-11-13 LAB
ANION GAP SERPL CALCULATED.3IONS-SCNC: 10 MMOL/L (ref 4–13)
APTT PPP: 69 SECONDS (ref 23–37)
APTT PPP: 80 SECONDS (ref 23–37)
BASOPHILS # BLD AUTO: 0.06 THOUSANDS/ÂΜL (ref 0–0.1)
BASOPHILS NFR BLD AUTO: 1 % (ref 0–1)
BUN SERPL-MCNC: 12 MG/DL (ref 5–25)
CALCIUM SERPL-MCNC: 9.3 MG/DL (ref 8.3–10.1)
CHLORIDE SERPL-SCNC: 108 MMOL/L (ref 96–108)
CO2 SERPL-SCNC: 24 MMOL/L (ref 21–32)
CREAT SERPL-MCNC: 1.17 MG/DL (ref 0.6–1.3)
EOSINOPHIL # BLD AUTO: 0.23 THOUSAND/ÂΜL (ref 0–0.61)
EOSINOPHIL NFR BLD AUTO: 4 % (ref 0–6)
ERYTHROCYTE [DISTWIDTH] IN BLOOD BY AUTOMATED COUNT: 14.4 % (ref 11.6–15.1)
GFR SERPL CREATININE-BSD FRML MDRD: 43 ML/MIN/1.73SQ M
GLUCOSE SERPL-MCNC: 93 MG/DL (ref 65–140)
HCT VFR BLD AUTO: 36.5 % (ref 34.8–46.1)
HGB BLD-MCNC: 12.2 G/DL (ref 11.5–15.4)
IMM GRANULOCYTES # BLD AUTO: 0.01 THOUSAND/UL (ref 0–0.2)
IMM GRANULOCYTES NFR BLD AUTO: 0 % (ref 0–2)
INR PPP: 1.79 (ref 0.84–1.19)
LYMPHOCYTES # BLD AUTO: 1.53 THOUSANDS/ÂΜL (ref 0.6–4.47)
LYMPHOCYTES NFR BLD AUTO: 27 % (ref 14–44)
MCH RBC QN AUTO: 29.3 PG (ref 26.8–34.3)
MCHC RBC AUTO-ENTMCNC: 33.4 G/DL (ref 31.4–37.4)
MCV RBC AUTO: 88 FL (ref 82–98)
MONOCYTES # BLD AUTO: 0.5 THOUSAND/ÂΜL (ref 0.17–1.22)
MONOCYTES NFR BLD AUTO: 9 % (ref 4–12)
NEUTROPHILS # BLD AUTO: 3.39 THOUSANDS/ÂΜL (ref 1.85–7.62)
NEUTS SEG NFR BLD AUTO: 59 % (ref 43–75)
NRBC BLD AUTO-RTO: 0 /100 WBCS
PLATELET # BLD AUTO: 162 THOUSANDS/UL (ref 149–390)
PMV BLD AUTO: 9.8 FL (ref 8.9–12.7)
POTASSIUM SERPL-SCNC: 4.1 MMOL/L (ref 3.5–5.3)
PROTHROMBIN TIME: 20.4 SECONDS (ref 11.6–14.5)
RBC # BLD AUTO: 4.17 MILLION/UL (ref 3.81–5.12)
SODIUM SERPL-SCNC: 142 MMOL/L (ref 135–147)
WBC # BLD AUTO: 5.72 THOUSAND/UL (ref 4.31–10.16)

## 2022-11-13 RX ORDER — SODIUM CHLORIDE 9 MG/ML
50 INJECTION, SOLUTION INTRAVENOUS CONTINUOUS
Status: DISCONTINUED | OUTPATIENT
Start: 2022-11-13 | End: 2022-11-14

## 2022-11-13 RX ORDER — AMLODIPINE BESYLATE 5 MG/1
5 TABLET ORAL DAILY
Status: DISCONTINUED | OUTPATIENT
Start: 2022-11-13 | End: 2022-11-16 | Stop reason: HOSPADM

## 2022-11-13 RX ORDER — HYDRALAZINE HYDROCHLORIDE 20 MG/ML
5 INJECTION INTRAMUSCULAR; INTRAVENOUS EVERY 6 HOURS PRN
Status: DISCONTINUED | OUTPATIENT
Start: 2022-11-13 | End: 2022-11-16 | Stop reason: HOSPADM

## 2022-11-13 RX ADMIN — SODIUM CHLORIDE 50 ML/HR: 0.9 INJECTION, SOLUTION INTRAVENOUS at 21:23

## 2022-11-13 RX ADMIN — ASPIRIN 81 MG: 81 TABLET, COATED ORAL at 09:05

## 2022-11-13 RX ADMIN — METOPROLOL SUCCINATE 25 MG: 25 TABLET, EXTENDED RELEASE ORAL at 09:06

## 2022-11-13 RX ADMIN — AMLODIPINE BESYLATE 5 MG: 5 TABLET ORAL at 11:11

## 2022-11-13 NOTE — PROGRESS NOTES
[address]  Progress Note - Ashley Phillips 1940, 80 y o  female MRN: [id number]  Unit/Bed#: -01 Encounter: [id number]  Primary Care Provider: No primary care provider on file  Date and time admitted to hospital: 11/7/2022  2:54 PM    * Weakness  Assessment & Plan  · Pt presented to ED after an episode of weakness with associated dizziness and lightheadedness around 1pm followed by 5 episodes nbnb vomiting and diarrhea  · CT head: no acute intracranial abnormality  · CT A/P: cardiomegaly with biatrial dilation  · CXR: no evidence of vascular congestion   · Cardiology following  patient now agreeable to cardiac catheterization  · Continue heparin while holding Coumadin  · Echo showed preserved ejection fraction of 55%, RV mildly dilated, biatrial dilation, bioprosthetic TAVR valve, and moderate pulmonary hypertension  · IV zofran    CKD (chronic kidney disease)  Assessment & Plan  Lab Results   Component Value Date    EGFR 43 11/13/2022    EGFR 45 11/12/2022    EGFR 45 11/12/2022    CREATININE 1 17 11/13/2022    CREATININE 1 13 11/12/2022    CREATININE 1 13 11/12/2022     · Cr at baseline, continue to monitor  · Nephrology on board for renal optimization prior to cardiac catheterization    Elevated troponin  Assessment & Plan  · Concerning for angina  · Cardiology on board, appreciate recommendations  · Plan for cardiac catheterization on Monday    Awaiting INR to be below 1 8    Chronic diastolic heart failure (HCC)  Assessment & Plan  Wt Readings from Last 3 Encounters:   11/13/22 69 2 kg (152 lb 8 9 oz)     Last echo 1/22: EF>70%,   · Echo results under weakness  · Monitor I/Os, daily weights  · Monitor electrolytes  · Continue metoprolol  · Diuretics on hold in anticipation of cardiac catheterization on Monday    Hypertension  Assessment & Plan  · Blood pressure above goal  · Continue metoprolol, diuretics on hold  · Amlodipine started  · IV hydralazine p r n   · Monitor blood pressure      Atrial fibrillation (HCC)  Assessment & Plan  · Continue rate control meds  · Coumadin being withheld due to potential cardiac catheterization tomorrow, continue to hold due to cardiac catheterization  · Continue heparin      VTE Pharmacologic Prophylaxis:   Pharmacologic: Heparin Drip  Mechanical VTE Prophylaxis in Place: Yes    Review of Systems:    Review of Systems   Constitutional: Negative for chills and fever  HENT: Negative for ear pain and sore throat  Eyes: Negative for pain and visual disturbance  Respiratory: Negative for cough and shortness of breath  Cardiovascular: Negative for chest pain and palpitations  Gastrointestinal: Negative for abdominal pain and vomiting  Genitourinary: Negative for dysuria and hematuria  Musculoskeletal: Negative for arthralgias and back pain  Skin: Negative for color change and rash  Neurological: Negative for seizures and syncope  All other systems reviewed and are negative  Past Medical and Surgical History:     History reviewed  No pertinent past medical history  Past Surgical History:   Procedure Laterality Date   • CARDIAC SURGERY         Patient Centered Rounds:     Discussions with Specialists or Other Care Team Provider:     Education and Discussions with Family / Patient:  Patient and  at bedside    Time Spent for Care: 30 minutes  More than 50% of total time spent on counseling and coordination of care as described above      Current Length of Stay: 5 day(s)    Current Patient Status: Inpatient   Certification Statement: The patient will continue to require additional inpatient hospital stay due to Cardiac catheterization planned for tomorrow    Discharge Plan:  Likely tomorrow pending cardiac cath     Code Status: Level 1 - Full Code      Subjective:   Patient denies any complaints    Objective:     Vitals:   Temp (24hrs), Av 3 °F (36 8 °C), Min:97 8 °F (36 6 °C), Max:99 2 °F (37 3 °C)    Temp:  [97 8 °F (36 6 °C)-99 2 °F (37 3 °C)] 99 2 °F (37 3 °C)  HR:  [] 100  Resp:  [18-19] 18  BP: (149-173)/() 173/108  SpO2:  [94 %-97 %] 96 %  Body mass index is 29 79 kg/m²  Input and Output Summary (last 24 hours): Intake/Output Summary (Last 24 hours) at 11/13/2022 1138  Last data filed at 11/13/2022 0709  Gross per 24 hour   Intake 632 92 ml   Output 1000 ml   Net -367 08 ml       Physical Exam:     Physical Exam  Vitals and nursing note reviewed  Constitutional:       General: She is not in acute distress  Appearance: She is well-developed  She is not ill-appearing or diaphoretic  HENT:      Head: Normocephalic and atraumatic  Mouth/Throat:      Mouth: Mucous membranes are moist       Pharynx: Oropharynx is clear  Eyes:      General: No scleral icterus  Conjunctiva/sclera: Conjunctivae normal    Cardiovascular:      Rate and Rhythm: Normal rate and regular rhythm  Heart sounds: Normal heart sounds  No murmur heard  Pulmonary:      Effort: Pulmonary effort is normal  No respiratory distress  Breath sounds: Normal breath sounds  No wheezing  Abdominal:      General: Bowel sounds are normal       Palpations: Abdomen is soft  Tenderness: There is no abdominal tenderness  Musculoskeletal:      Cervical back: Normal range of motion and neck supple  Right lower leg: No edema  Left lower leg: No edema  Skin:     General: Skin is warm and dry  Neurological:      General: No focal deficit present  Mental Status: She is alert and oriented to person, place, and time     Psychiatric:         Mood and Affect: Mood normal          Behavior: Behavior normal            Additional Data:     Labs:    Results from last 7 days   Lab Units 11/13/22  0618   WBC Thousand/uL 5 72   HEMOGLOBIN g/dL 12 2   HEMATOCRIT % 36 5   PLATELETS Thousands/uL 162   NEUTROS PCT % 59   LYMPHS PCT % 27   MONOS PCT % 9   EOS PCT % 4     Results from last 7 days   Lab Units 11/13/22  0618 11/08/22  0705 11/07/22  1600   SODIUM mmol/L 142   < > 142   POTASSIUM mmol/L 4 1   < > 3 5   CHLORIDE mmol/L 108   < > 103   CO2 mmol/L 24   < > 28   BUN mg/dL 12   < > 26*   CREATININE mg/dL 1 17   < > 1 37*   ANION GAP mmol/L 10   < > 11   CALCIUM mg/dL 9 3   < > 9 7   ALBUMIN g/dL  --   --  4 1   TOTAL BILIRUBIN mg/dL  --   --  0 58   ALK PHOS U/L  --   --  69   ALT U/L  --   --  21   AST U/L  --   --  33   GLUCOSE RANDOM mg/dL 93   < > 129    < > = values in this interval not displayed  Results from last 7 days   Lab Units 11/13/22  0618   INR  1 79*             Results from last 7 days   Lab Units 11/07/22  2241   PROCALCITONIN ng/ml 0 06           * I Have Reviewed All Lab Data Listed Above  * Additional Pertinent Lab Tests Reviewed: Trevor [address] Admission Reviewed    Imaging:    Imaging Reports Reviewed Today Include:  None  Imaging Personally Reviewed by Myself Includes:  None    Recent Cultures (last 7 days):           Last 24 Hours Medication List:   Current Facility-Administered Medications   Medication Dose Route Frequency Provider Last Rate   • amLODIPine  5 mg Oral Daily Kecia Logan MD     • aspirin  81 mg Oral Daily Colby Montalvo MD     • heparin (porcine)  3-30 Units/kg/hr (Order-Specific) Intravenous Titrated NOE Brock 9 Units/kg/hr (11/13/22 0709)   • heparin (porcine)  2,800 Units Intravenous Q1H PRN NOE Cooley     • heparin (porcine)  5,600 Units Intravenous Q1H PRN NOE Cooley     • hydrALAZINE  5 mg Intravenous Q6H PRN Anamaria Ochoa MD     • metoprolol succinate  25 mg Oral Daily Sherri Hines PA-C     • sodium chloride  50 mL/hr Intravenous Continuous Kecia Logan MD          Today, Patient Was Seen By: Ozzy Porter MD    ** Please Note: Dictation voice to text software may have been used in the creation of this document   **

## 2022-11-13 NOTE — ASSESSMENT & PLAN NOTE
· Continue rate control meds  · Coumadin being withheld due to potential cardiac catheterization tomorrow, continue to hold due to cardiac catheterization  · Continue heparin

## 2022-11-13 NOTE — PROGRESS NOTES
NEPHROLOGY PROGRESS NOTE    Patient: Chel Tolliver               Sex: female          DOA: 11/7/2022  2:54 PM   YOB: 1940        Age:  80 y o         LOS:  LOS: 5 days       HPI     Patient is CKD and coronary artery disease    SUBJECTIVE     Overall feeling well    Denies any complaint    No chest pain no palpitation    Does not appear to be short of breath    CURRENT MEDICATIONS       Current Facility-Administered Medications:   •  amLODIPine (NORVASC) tablet 5 mg, 5 mg, Oral, Daily, Laura Dominguez MD  •  aspirin (ECOTRIN LOW STRENGTH) EC tablet 81 mg, 81 mg, Oral, Daily, Piter Gu MD, 81 mg at 11/13/22 2605  •  heparin (porcine) 25,000 units in 0 45% NaCl 250 mL infusion (premix), 3-30 Units/kg/hr (Order-Specific), Intravenous, Titrated, NOE Cooley, Last Rate: 6 3 mL/hr at 11/13/22 0709, 9 Units/kg/hr at 11/13/22 0709  •  heparin (porcine) injection 2,800 Units, 2,800 Units, Intravenous, Q1H PRN, NOE Cooley  •  heparin (porcine) injection 5,600 Units, 5,600 Units, Intravenous, Q1H PRN, NOE Cooley  •  hydrALAZINE (APRESOLINE) injection 5 mg, 5 mg, Intravenous, Q6H PRN, Claudene Hammans, MD  •  metoprolol succinate (TOPROL-XL) 24 hr tablet 25 mg, 25 mg, Oral, Daily, Eliana Vasquez PA-C, 25 mg at 11/13/22 0906  •  sodium chloride 0 9 % infusion, 50 mL/hr, Intravenous, Continuous, Laura Dominguez MD    OBJECTIVE     Current Weight: Weight - Scale: 69 2 kg (152 lb 8 9 oz)  Vitals:    11/13/22 1048   BP: (!) 173/108   Pulse: 100   Resp:    Temp:    SpO2: 96%       Intake/Output Summary (Last 24 hours) at 11/13/2022 1053  Last data filed at 11/13/2022 0131  Gross per 24 hour   Intake 632 92 ml   Output 1000 ml   Net -367 08 ml       PHYSICAL EXAMINATION     Physical Exam  Constitutional:       General: She is not in acute distress  Appearance: She is well-developed  HENT:      Head: Normocephalic  Eyes:      General: No scleral icterus       Conjunctiva/sclera: Conjunctivae normal    Neck:      Vascular: No JVD  Cardiovascular:      Rate and Rhythm: Normal rate  Heart sounds: Normal heart sounds  Pulmonary:      Effort: Pulmonary effort is normal       Breath sounds: No wheezing  Abdominal:      Palpations: Abdomen is soft  Tenderness: There is no abdominal tenderness  Musculoskeletal:         General: Normal range of motion  Cervical back: Neck supple  Skin:     General: Skin is warm  Findings: No rash  Neurological:      Mental Status: She is alert and oriented to person, place, and time  Psychiatric:         Behavior: Behavior normal           LAB RESULTS     Results from last 7 days   Lab Units 11/13/22  0618 11/12/22  1042 11/12/22  0358 11/11/22  0513 11/10/22  0609 11/09/22  0708 11/08/22  0705 11/07/22  1600   WBC Thousand/uL 5 72 5 86 5 54 4 99 5 49 5 84 7 95 8 24   HEMOGLOBIN g/dL 12 2 12 8 11 8 11 9 12 2 12 8 13 4 13 3   HEMATOCRIT % 36 5 39 4 35 8 35 2 37 4 39 3 40 0 40 7   PLATELETS Thousands/uL 162 183 154 149 172 171 194 166   POTASSIUM mmol/L 4 1 4 0 4 0 4 2 4 1 3 9 3 8 3 5   CHLORIDE mmol/L 108 105 108 105 105 103 101 103   CO2 mmol/L 24 25 28 28 32 29 32 28   BUN mg/dL 12 16 18 22 21 26* 24 26*   CREATININE mg/dL 1 17 1 13 1 13 1 25 1 36* 1 50* 1 60* 1 37*   EGFR ml/min/1 73sq m 43 45 45 40 36 32 29 35   CALCIUM mg/dL 9 3 9 3 9 1 9 0 8 9 9 0 9 4 9 7   MAGNESIUM mg/dL  --   --   --   --   --   --  2 3 2 3       RADIOLOGY RESULTS      No results found for this or any previous visit  Results for orders placed during the hospital encounter of 11/07/22    XR chest 2 views    Narrative  CHEST    INDICATION:   dizziness  Weakness    COMPARISON:  None    EXAM PERFORMED/VIEWS:  XR CHEST PA & LATERAL  Images: 2    FINDINGS:    Cardiomediastinal silhouette appears enlarged  A median sternotomy has been performed  Prosthetic aortic valve  The lungs are clear  No pneumothorax or pleural effusion      Osseous structures appear within normal limits for patient age  Impression  No acute cardiopulmonary disease  Workstation performed: [id number]    No results found for this or any previous visit  No results found for this or any previous visit  No results found for this or any previous visit  No results found for this or any previous visit  PLAN / RECOMMENDATIONS      CKD stage 3:  Stable at this point    Coronary artery disease: Will requiring cardiac catheterization tomorrow  Will provide gentle IV hydration overnight for renal protection    Hypertension:  Blood pressure is on higher side  Likely because of holding diuretic  Will add amlodipine as part of the blood pressure management    Will continue to monitor    Carolyne Stevens MD  Nephrology  11/13/2022        Portions of the record may have been created with voice recognition software  Occasional wrong word or "sound a like" substitutions may have occurred due to the inherent limitations of voice recognition software  Read the chart carefully and recognize, using context, where substitutions have occurred

## 2022-11-13 NOTE — PROGRESS NOTES
General Cardiology   Progress Note   Chel Tolliver 80 y o  female MRN: [id number]  Unit/Bed#: -01 Encounter: [id number]    Assessment/Plan:    1  Elevated troponin  -troponin with a peak of 425  -patient also complained of weakness, dizziness and nausea   -TTE performed revealed normal systolic function with an EF 55%, moderate tricuspid regurgitation, moderate pulmonary hypertension, and bioprosthetic aortic valve also noted  -plan for coronary angiography on Monday if INR stays below 1 8  -nephrology on board for prevention of [place] after coronary angiography on Monday  -telemetry monitoring    2  Chronic atrial fibrillation  -heart rates currently controlled in the 80s to 90s  -continue metoprolol succinate for rate control  -warfarin currently on hold, INR 1 79 today  -continue heparin drip for anticoagulation    3  Hypertension  -blood pressure is overall well controlled  -continue metoprolol succinate    4  Aortic stenosis s/p TAVR x2  -noted on TTE with no evidence of regurgitation or significant stenosis    5  Acute kidney injury on CKD stage 3, resolved  -creatinine has normalized at 1 17 today   -nephrology following       Subjective:   Patient seen and examined  No significant events since the last encounter       REVIEW OF SYSTEMS:  Constitutional:  Denies fever or chills   Eyes:  Denies change in visual acuity   HENT:  Denies nasal congestion or sore throat   Respiratory:  Denies cough, orthopnea, PND or shortness of breath   Cardiovascular:  Denies chest pain, palpitations or edema   GI:  Denies abdominal pain, nausea, vomiting, bloody stools, constipation or diarrhea   :  Denies dysuria, frequency, difficulty in urination or nocturia  Musculoskeletal:  Denies back pain or joint pain   Neurologic:  Denies headache, focal weakness or sensory changes   Endocrine:  Denies polyuria or polydipsia   Lymphatic:  Denies swollen glands   Psychiatric:  Denies depression or anxiety     Objective: Vitals:  Vitals:    11/13/22 0829   BP: 166/100   Pulse: 99   Resp: 18   Temp: 99 2 °F (37 3 °C)   SpO2: 95%       Body mass index is 29 79 kg/m²  Systolic (75BGO), KUT:548 , Min:149 , VDK:103     Diastolic (16RNE), XHW:71, Min:90, Max:100      Intake/Output Summary (Last 24 hours) at 11/13/2022 0914  Last data filed at 11/13/2022 0709  Gross per 24 hour   Intake 632 92 ml   Output 1000 ml   Net -367 08 ml     Weight (last 2 days)     Date/Time Weight    11/13/22 0600 69 2 (152 56)    11/12/22 07:16:59 69 7 (153 66)    11/12/22 0600 69 7 (153 66)    11/11/22 0536 70 8 (156 09)    11/11/22 0500 70 8 (156 09)          Telemetry Review: No significant arrhythmias seen on telemetry review  PHYSICAL EXAMS  General:  Patient is not in acute distress, laying in the bed comfortably, awake  Head: Normocephalic, Atraumatic     HEENT: White sclera, pink conjunctiva  Neck:  Supple, no neck vein distention  Respiratory: clear to auscultation   Cardiovascular: S1-S2 normal, no murmurs, thrills, gallops, rubs, regular rhythm  GI:  Abdomen soft, nontender, non-distended  Extremities: No edema, normal pulses  Integument:  No skin rashes or ulceration  Neurologic:  Patient is awake alert, responding to command, oriented to person, place and time    LABORATORY RESULTS:      CBC with diff:   Results from last 7 days   Lab Units 11/13/22 0618 11/12/22 1042 11/12/22  0358   WBC Thousand/uL 5 72 5 86 5 54   HEMOGLOBIN g/dL 12 2 12 8 11 8   HEMATOCRIT % 36 5 39 4 35 8   MCV fL 88 89 88   PLATELETS Thousands/uL 162 183 154   MCH pg 29 3 28 8 29 1   MCHC g/dL 33 4 32 5 33 0   RDW % 14 4 14 4 14 2   MPV fL 9 8 9 9 9 8   NRBC AUTO /100 WBCs 0 0 0       CMP:  Results from last 7 days   Lab Units 11/13/22 0618 11/12/22  1042 11/12/22 0358 11/08/22  0705 11/07/22  1600   POTASSIUM mmol/L 4 1 4 0 4 0   < > 3 5   CHLORIDE mmol/L 108 105 108   < > 103   CO2 mmol/L 24 25 28   < > 28   BUN mg/dL 12 16 18   < > 26*   CREATININE mg/dL 1 17 1  13 1 13   < > 1 37*   CALCIUM mg/dL 9 3 9 3 9 1   < > 9 7   AST U/L  --   --   --   --  33   ALT U/L  --   --   --   --  21   ALK PHOS U/L  --   --   --   --  69   EGFR ml/min/1 73sq m 43 45 45   < > 35    < > = values in this interval not displayed  BMP:  Results from last 7 days   Lab Units 11/13/22  0618 11/12/22  1042 11/12/22  0358   POTASSIUM mmol/L 4 1 4 0 4 0   CHLORIDE mmol/L 108 105 108   CO2 mmol/L 24 25 28   BUN mg/dL 12 16 18   CREATININE mg/dL 1 17 1 13 1 13   CALCIUM mg/dL 9 3 9 3 9 1         Results from last 7 days   Lab Units 11/07/22  1600   NT-PRO BNP pg/mL 1,040*      Results from last 7 days   Lab Units 11/08/22  0705 11/07/22  1600   MAGNESIUM mg/dL 2 3 2 3             Results from last 7 days   Lab Units 11/13/22  0618 11/12/22  1042 11/12/22  0353 11/11/22  1245 11/11/22  0513 11/10/22  0609 11/09/22  0708   INR  1 79* 1 82* 2 11* 1 95* 1 83* 2 38* 2 72*       Lipid Profile:   No results found for: CHOL  No results found for: HDL  No results found for: LDLCALC  No results found for: TRIG    Cardiac testing:   No results found for this or any previous visit  No results found for this or any previous visit  No results found for this or any previous visit  No valid procedures specified  No results found for this or any previous visit  Meds/Allergies   all current active meds have been reviewed  Medications Prior to Admission   Medication   • furosemide (LASIX) 20 mg tablet   • metoprolol succinate (TOPROL-XL) 25 mg 24 hr tablet   • warfarin (COUMADIN) 1 mg tablet       heparin (porcine), 3-30 Units/kg/hr (Order-Specific), Last Rate: 9 Units/kg/hr (11/13/22 0709)          ** Please Note: Dragon 360 Dictation voice to text software may have been used in the creation of this document   **

## 2022-11-13 NOTE — PLAN OF CARE
Problem: MOBILITY - ADULT  Goal: Maintain or return to baseline ADL function  Description: INTERVENTIONS:  -  Assess patient's ability to carry out ADLs; assess patient's baseline for ADL function and identify physical deficits which impact ability to perform ADLs (bathing, care of mouth/teeth, toileting, grooming, dressing, etc )  - Assess/evaluate cause of self-care deficits   - Assess range of motion  - Assess patient's mobility; develop plan if impaired  - Assess patient's need for assistive devices and provide as appropriate  - Encourage maximum independence but intervene and supervise when necessary  - Involve family in performance of ADLs  - Assess for home care needs following discharge   - Consider OT consult to assist with ADL evaluation and planning for discharge  - Provide patient education as appropriate  Outcome: Progressing  Goal: Maintains/Returns to pre admission functional level  Description: INTERVENTIONS:  - Perform BMAT or MOVE assessment daily    - Set and communicate daily mobility goal to care team and patient/family/caregiver  - Collaborate with rehabilitation services on mobility goals if consulted  - Perform Range of Motion *** times a day  - Reposition patient every *** hours    - Dangle patient *** times a day  - Stand patient *** times a day  - Ambulate patient *** times a day  - Out of bed to chair *** times a day   - Out of bed for meals *** times a day  - Out of bed for toileting  - Record patient progress and toleration of activity level   Outcome: Progressing     Problem: Potential for Falls  Goal: Patient will remain free of falls  Description: INTERVENTIONS:  - Educate patient/family on patient safety including physical limitations  - Instruct patient to call for assistance with activity   - Consult OT/PT to assist with strengthening/mobility   - Keep Call bell within reach  - Keep bed low and locked with side rails adjusted as appropriate  - Keep care items and personal belongings within reach  - Initiate and maintain comfort rounds  - Make Fall Risk Sign visible to staff  - Offer Toileting every *** Hours, in advance of need  - Initiate/Maintain ***alarm  - Obtain necessary fall risk management equipment: ***  - Apply yellow socks and bracelet for high fall risk patients  - Consider moving patient to room near nurses station  Outcome: Progressing     Problem: PAIN - ADULT  Goal: Verbalizes/displays adequate comfort level or baseline comfort level  Description: Interventions:  - Encourage patient to monitor pain and request assistance  - Assess pain using appropriate pain scale  - Administer analgesics based on type and severity of pain and evaluate response  - Implement non-pharmacological measures as appropriate and evaluate response  - Consider cultural and social influences on pain and pain management  - Notify physician/advanced practitioner if interventions unsuccessful or patient reports new pain  Outcome: Progressing     Problem: INFECTION - ADULT  Goal: Absence or prevention of progression during hospitalization  Description: INTERVENTIONS:  - Assess and monitor for signs and symptoms of infection  - Monitor lab/diagnostic results  - Monitor all insertion sites, i e  indwelling lines, tubes, and drains  - Monitor endotracheal if appropriate and nasal secretions for changes in amount and color  - [place] appropriate cooling/warming therapies per order  - Administer medications as ordered  - Instruct and encourage patient and family to use good hand hygiene technique  - Identify and instruct in appropriate isolation precautions for identified infection/condition  Outcome: Progressing  Goal: Absence of fever/infection during neutropenic period  Description: INTERVENTIONS:  - Monitor WBC    Outcome: Progressing     Problem: SAFETY ADULT  Goal: Maintain or return to baseline ADL function  Description: INTERVENTIONS:  -  Assess patient's ability to carry out ADLs; assess patient's baseline for ADL function and identify physical deficits which impact ability to perform ADLs (bathing, care of mouth/teeth, toileting, grooming, dressing, etc )  - Assess/evaluate cause of self-care deficits   - Assess range of motion  - Assess patient's mobility; develop plan if impaired  - Assess patient's need for assistive devices and provide as appropriate  - Encourage maximum independence but intervene and supervise when necessary  - Involve family in performance of ADLs  - Assess for home care needs following discharge   - Consider OT consult to assist with ADL evaluation and planning for discharge  - Provide patient education as appropriate  Outcome: Progressing  Goal: Maintains/Returns to pre admission functional level  Description: INTERVENTIONS:  - Perform BMAT or MOVE assessment daily    - Set and communicate daily mobility goal to care team and patient/family/caregiver  - Collaborate with rehabilitation services on mobility goals if consulted  - Perform Range of Motion *** times a day  - Reposition patient every *** hours    - Dangle patient *** times a day  - Stand patient *** times a day  - Ambulate patient *** times a day  - Out of bed to chair *** times a day   - Out of bed for meals *** times a day  - Out of bed for toileting  - Record patient progress and toleration of activity level   Outcome: Progressing  Goal: Patient will remain free of falls  Description: INTERVENTIONS:  - Educate patient/family on patient safety including physical limitations  - Instruct patient to call for assistance with activity   - Consult OT/PT to assist with strengthening/mobility   - Keep Call bell within reach  - Keep bed low and locked with side rails adjusted as appropriate  - Keep care items and personal belongings within reach  - Initiate and maintain comfort rounds  - Make Fall Risk Sign visible to staff  - Offer Toileting every *** Hours, in advance of need  - Initiate/Maintain ***alarm  - Obtain necessary fall risk management equipment: ***  - Apply yellow socks and bracelet for high fall risk patients  - Consider moving patient to room near nurses station  Outcome: Progressing     Problem: DISCHARGE PLANNING  Goal: Discharge to home or other facility with appropriate resources  Description: INTERVENTIONS:  - Identify barriers to discharge w/patient and caregiver  - Arrange for needed discharge resources and transportation as appropriate  - Identify discharge learning needs (meds, wound care, etc )  - Arrange for interpretive services to assist at discharge as needed  - Refer to Case Management Department for coordinating discharge planning if the patient needs post-hospital services based on physician/advanced practitioner order or complex needs related to functional status, cognitive ability, or social support system  Outcome: Progressing     Problem: Knowledge Deficit  Goal: Patient/family/caregiver demonstrates understanding of disease process, treatment plan, medications, and discharge instructions  Description: Complete learning assessment and assess knowledge base    Interventions:  - Provide teaching at level of understanding  - Provide teaching via preferred learning methods  Outcome: Progressing     Problem: Prexisting or High Potential for Compromised Skin Integrity  Goal: Skin integrity is maintained or improved  Description: INTERVENTIONS:  - Identify patients at risk for skin breakdown  - Assess and monitor skin integrity  - Assess and monitor nutrition and hydration status  - Monitor labs   - Assess for incontinence   - Turn and reposition patient  - Assist with mobility/ambulation  - Relieve pressure over bony prominences  - Avoid friction and shearing  - Provide appropriate hygiene as needed including keeping skin clean and dry  - Evaluate need for skin moisturizer/barrier cream  - Collaborate with interdisciplinary team   - Patient/family teaching  - Consider wound care consult   Outcome: Progressing     Problem: CARDIOVASCULAR - ADULT  Goal: Maintains optimal cardiac output and hemodynamic stability  Description: INTERVENTIONS:  - Monitor I/O, vital signs and rhythm  - Monitor for S/S and trends of decreased cardiac output  - Administer and titrate ordered vasoactive medications to optimize hemodynamic stability  - Assess quality of pulses, skin color and temperature  - Assess for signs of decreased coronary artery perfusion  - Instruct patient to report change in severity of symptoms  Outcome: Progressing     Problem: METABOLIC, FLUID AND ELECTROLYTES - ADULT  Goal: Fluid balance maintained  Description: INTERVENTIONS:  - Monitor labs   - Monitor I/O and WT  - Instruct patient on fluid and nutrition as appropriate  - Assess for signs & symptoms of volume excess or deficit  Outcome: Progressing     Problem: HEMATOLOGIC - ADULT  Goal: Maintains hematologic stability  Description: INTERVENTIONS  - Assess for signs and symptoms of bleeding or hemorrhage  - Monitor labs  - Administer supportive blood products/factors as ordered and appropriate  Outcome: Progressing

## 2022-11-13 NOTE — ASSESSMENT & PLAN NOTE
Lab Results   Component Value Date    EGFR 43 11/13/2022    EGFR 45 11/12/2022    EGFR 45 11/12/2022    CREATININE 1 17 11/13/2022    CREATININE 1 13 11/12/2022    CREATININE 1 13 11/12/2022     · Cr at baseline, continue to monitor  · Nephrology on board for renal optimization prior to cardiac catheterization

## 2022-11-13 NOTE — PLAN OF CARE
Problem: MOBILITY - ADULT  Goal: Maintain or return to baseline ADL function  Description: INTERVENTIONS:  -  Assess patient's ability to carry out ADLs; assess patient's baseline for ADL function and identify physical deficits which impact ability to perform ADLs (bathing, care of mouth/teeth, toileting, grooming, dressing, etc )  - Assess/evaluate cause of self-care deficits   - Assess range of motion  - Assess patient's mobility; develop plan if impaired  - Assess patient's need for assistive devices and provide as appropriate  - Encourage maximum independence but intervene and supervise when necessary  - Involve family in performance of ADLs  - Assess for home care needs following discharge   - Consider OT consult to assist with ADL evaluation and planning for discharge  - Provide patient education as appropriate  Outcome: Progressing  Goal: Maintains/Returns to pre admission functional level  Description: INTERVENTIONS:  - Perform BMAT or MOVE assessment daily    - Set and communicate daily mobility goal to care team and patient/family/caregiver  - Collaborate with rehabilitation services on mobility goals if consulted  - Perform Range of Motion 3 times a day  - Reposition patient every 2 hours    - Dangle patient 3 times a day  - Stand patient 3 times a day  - Ambulate patient 3 times a day  - Out of bed to chair 3 times a day   - Out of bed for meals 3 times a day  - Out of bed for toileting  - Record patient progress and toleration of activity level   Outcome: Progressing     Problem: Potential for Falls  Goal: Patient will remain free of falls  Description: INTERVENTIONS:  - Educate patient/family on patient safety including physical limitations  - Instruct patient to call for assistance with activity   - Consult OT/PT to assist with strengthening/mobility   - Keep Call bell within reach  - Keep bed low and locked with side rails adjusted as appropriate  - Keep care items and personal belongings within reach  - Initiate and maintain comfort rounds  - Make Fall Risk Sign visible to staff  - Offer Toileting every 2 Hours, in advance of need  - Initiate/Maintain bedalarm  - Obtain necessary fall risk management equipment:   - Apply yellow socks and bracelet for high fall risk patients  - Consider moving patient to room near nurses station  Outcome: Progressing     Problem: PAIN - ADULT  Goal: Verbalizes/displays adequate comfort level or baseline comfort level  Description: Interventions:  - Encourage patient to monitor pain and request assistance  - Assess pain using appropriate pain scale  - Administer analgesics based on type and severity of pain and evaluate response  - Implement non-pharmacological measures as appropriate and evaluate response  - Consider cultural and social influences on pain and pain management  - Notify physician/advanced practitioner if interventions unsuccessful or patient reports new pain  Outcome: Progressing     Problem: INFECTION - ADULT  Goal: Absence or prevention of progression during hospitalization  Description: INTERVENTIONS:  - Assess and monitor for signs and symptoms of infection  - Monitor lab/diagnostic results  - Monitor all insertion sites, i e  indwelling lines, tubes, and drains  - Monitor endotracheal if appropriate and nasal secretions for changes in amount and color  - [place] appropriate cooling/warming therapies per order  - Administer medications as ordered  - Instruct and encourage patient and family to use good hand hygiene technique  - Identify and instruct in appropriate isolation precautions for identified infection/condition  Outcome: Progressing  Goal: Absence of fever/infection during neutropenic period  Description: INTERVENTIONS:  - Monitor WBC    Outcome: Progressing     Problem: SAFETY ADULT  Goal: Maintain or return to baseline ADL function  Description: INTERVENTIONS:  -  Assess patient's ability to carry out ADLs; assess patient's baseline for ADL function and identify physical deficits which impact ability to perform ADLs (bathing, care of mouth/teeth, toileting, grooming, dressing, etc )  - Assess/evaluate cause of self-care deficits   - Assess range of motion  - Assess patient's mobility; develop plan if impaired  - Assess patient's need for assistive devices and provide as appropriate  - Encourage maximum independence but intervene and supervise when necessary  - Involve family in performance of ADLs  - Assess for home care needs following discharge   - Consider OT consult to assist with ADL evaluation and planning for discharge  - Provide patient education as appropriate  Outcome: Progressing  Goal: Maintains/Returns to pre admission functional level  Description: INTERVENTIONS:  - Perform BMAT or MOVE assessment daily    - Set and communicate daily mobility goal to care team and patient/family/caregiver  - Collaborate with rehabilitation services on mobility goals if consulted  - Perform Range of Motion 3 times a day  - Reposition patient every 2 hours    - Dangle patient 3 times a day  - Stand patient 3 times a day  - Ambulate patient  times a day  - Out of bed to chair 3 times a day   - Out of bed for meals 3 times a day  - Out of bed for toileting  - Record patient progress and toleration of activity level   Outcome: Progressing  Goal: Patient will remain free of falls  Description: INTERVENTIONS:  - Educate patient/family on patient safety including physical limitations  - Instruct patient to call for assistance with activity   - Consult OT/PT to assist with strengthening/mobility   - Keep Call bell within reach  - Keep bed low and locked with side rails adjusted as appropriate  - Keep care items and personal belongings within reach  - Initiate and maintain comfort rounds  - Make Fall Risk Sign visible to staff  - Offer Toileting every 2 Hours, in advance of need  - Initiate/Maintain bedalarm  - Obtain necessary fall risk management equipment:   - Apply yellow socks and bracelet for high fall risk patients  - Consider moving patient to room near nurses station  Outcome: Progressing     Problem: DISCHARGE PLANNING  Goal: Discharge to home or other facility with appropriate resources  Description: INTERVENTIONS:  - Identify barriers to discharge w/patient and caregiver  - Arrange for needed discharge resources and transportation as appropriate  - Identify discharge learning needs (meds, wound care, etc )  - Arrange for interpretive services to assist at discharge as needed  - Refer to Case Management Department for coordinating discharge planning if the patient needs post-hospital services based on physician/advanced practitioner order or complex needs related to functional status, cognitive ability, or social support system  Outcome: Progressing     Problem: Knowledge Deficit  Goal: Patient/family/caregiver demonstrates understanding of disease process, treatment plan, medications, and discharge instructions  Description: Complete learning assessment and assess knowledge base    Interventions:  - Provide teaching at level of understanding  - Provide teaching via preferred learning methods  Outcome: Progressing     Problem: Prexisting or High Potential for Compromised Skin Integrity  Goal: Skin integrity is maintained or improved  Description: INTERVENTIONS:  - Identify patients at risk for skin breakdown  - Assess and monitor skin integrity  - Assess and monitor nutrition and hydration status  - Monitor labs   - Assess for incontinence   - Turn and reposition patient  - Assist with mobility/ambulation  - Relieve pressure over bony prominences  - Avoid friction and shearing  - Provide appropriate hygiene as needed including keeping skin clean and dry  - Evaluate need for skin moisturizer/barrier cream  - Collaborate with interdisciplinary team   - Patient/family teaching  - Consider wound care consult   Outcome: Progressing     Problem: CARDIOVASCULAR - ADULT  Goal: Maintains optimal cardiac output and hemodynamic stability  Description: INTERVENTIONS:  - Monitor I/O, vital signs and rhythm  - Monitor for S/S and trends of decreased cardiac output  - Administer and titrate ordered vasoactive medications to optimize hemodynamic stability  - Assess quality of pulses, skin color and temperature  - Assess for signs of decreased coronary artery perfusion  - Instruct patient to report change in severity of symptoms  Outcome: Progressing     Problem: METABOLIC, FLUID AND ELECTROLYTES - ADULT  Goal: Fluid balance maintained  Description: INTERVENTIONS:  - Monitor labs   - Monitor I/O and WT  - Instruct patient on fluid and nutrition as appropriate  - Assess for signs & symptoms of volume excess or deficit  Outcome: Progressing     Problem: HEMATOLOGIC - ADULT  Goal: Maintains hematologic stability  Description: INTERVENTIONS  - Assess for signs and symptoms of bleeding or hemorrhage  - Monitor labs  - Administer supportive blood products/factors as ordered and appropriate  Outcome: Progressing

## 2022-11-13 NOTE — ASSESSMENT & PLAN NOTE
· Blood pressure above goal  · Continue metoprolol, diuretics on hold  · Amlodipine started  · IV hydralazine p r n   · Monitor blood pressure

## 2022-11-13 NOTE — ASSESSMENT & PLAN NOTE
Wt Readings from Last 3 Encounters:   11/13/22 69 2 kg (152 lb 8 9 oz)     Last echo 1/22: EF>70%,   · Echo results under weakness  · Monitor I/Os, daily weights  · Monitor electrolytes  · Continue metoprolol  · Diuretics on hold in anticipation of cardiac catheterization on Monday

## 2022-11-14 PROBLEM — Z95.2 H/O AORTIC VALVE REPLACEMENT: Status: ACTIVE | Noted: 2022-11-14

## 2022-11-14 LAB
ANION GAP SERPL CALCULATED.3IONS-SCNC: 8 MMOL/L (ref 4–13)
APTT PPP: 53 SECONDS (ref 23–37)
APTT PPP: 60 SECONDS (ref 23–37)
APTT PPP: 88 SECONDS (ref 23–37)
BUN SERPL-MCNC: 13 MG/DL (ref 5–25)
CALCIUM SERPL-MCNC: 9 MG/DL (ref 8.3–10.1)
CHLORIDE SERPL-SCNC: 109 MMOL/L (ref 96–108)
CO2 SERPL-SCNC: 23 MMOL/L (ref 21–32)
CREAT SERPL-MCNC: 1.12 MG/DL (ref 0.6–1.3)
GFR SERPL CREATININE-BSD FRML MDRD: 45 ML/MIN/1.73SQ M
GLUCOSE SERPL-MCNC: 93 MG/DL (ref 65–140)
INR PPP: 1.85 (ref 0.84–1.19)
POTASSIUM SERPL-SCNC: 4 MMOL/L (ref 3.5–5.3)
PROTHROMBIN TIME: 21 SECONDS (ref 11.6–14.5)
SODIUM SERPL-SCNC: 140 MMOL/L (ref 135–147)

## 2022-11-14 RX ORDER — SODIUM CHLORIDE 9 MG/ML
50 INJECTION, SOLUTION INTRAVENOUS CONTINUOUS
Status: DISCONTINUED | OUTPATIENT
Start: 2022-11-14 | End: 2022-11-15

## 2022-11-14 RX ADMIN — AMLODIPINE BESYLATE 5 MG: 5 TABLET ORAL at 09:20

## 2022-11-14 RX ADMIN — METOPROLOL SUCCINATE 25 MG: 25 TABLET, EXTENDED RELEASE ORAL at 09:20

## 2022-11-14 RX ADMIN — ASPIRIN 81 MG: 81 TABLET, COATED ORAL at 09:20

## 2022-11-14 RX ADMIN — HEPARIN SODIUM 11 UNITS/KG/HR: 10000 INJECTION, SOLUTION INTRAVENOUS at 06:31

## 2022-11-14 RX ADMIN — SODIUM CHLORIDE 50 ML/HR: 0.9 INJECTION, SOLUTION INTRAVENOUS at 22:03

## 2022-11-14 RX ADMIN — HEPARIN SODIUM 9 UNITS/KG/HR: 10000 INJECTION, SOLUTION INTRAVENOUS at 00:09

## 2022-11-14 RX ADMIN — HEPARIN SODIUM 2800 UNITS: 1000 INJECTION INTRAVENOUS; SUBCUTANEOUS at 06:30

## 2022-11-14 RX ADMIN — SODIUM CHLORIDE 50 ML/HR: 0.9 INJECTION, SOLUTION INTRAVENOUS at 10:22

## 2022-11-14 NOTE — PROGRESS NOTES
[address] [facility]  Progress Note - Inocencio Boone 1940, 80 y o  female MRN: [id number]  Unit/Bed#: -01 Encounter: [id number]  Primary Care Provider: No primary care provider on file  Date and time admitted to hospital: 11/7/2022  2:54 PM    * Elevated troponin  Assessment & Plan  · Concerning for angina  · Cardiology on board, appreciate recommendations  · Plan for cardiac catheterization tomorrow  Awaiting INR to be below 1 8    H/O aortic valve replacement  Assessment & Plan  · Coumadin on hold  · Continue heparin infusion    CKD (chronic kidney disease)  Assessment & Plan  Lab Results   Component Value Date    EGFR 45 11/14/2022    EGFR 43 11/13/2022    EGFR 45 11/12/2022    CREATININE 1 12 11/14/2022    CREATININE 1 17 11/13/2022    CREATININE 1 13 11/12/2022     · Cr at baseline, continue to monitor  · Nephrology on board for renal optimization prior to cardiac catheterization    Chronic diastolic heart failure ([facility] [address] )  Assessment & Plan  Wt Readings from Last 3 Encounters:   11/14/22 70 4 kg (155 lb 3 3 oz)     Last echo 1/22: EF>70%,   · Echo results under weakness  · Monitor I/Os, daily weights  · Monitor electrolytes  · Continue metoprolol  · Diuretics on hold in anticipation of cardiac catheterization on Monday    Weakness  Assessment & Plan  · Pt presented to ED after an episode of weakness with associated dizziness and lightheadedness around 1pm followed by 5 episodes nbnb vomiting and diarrhea  · CT head: no acute intracranial abnormality  · CT A/P: cardiomegaly with biatrial dilation  · CXR: no evidence of vascular congestion   · Cardiology following  patient now agreeable to cardiac catheterization      · Echo showed preserved ejection fraction of 55%, RV mildly dilated, biatrial dilation, bioprosthetic TAVR valve, and moderate pulmonary hypertension    Hypertension  Assessment & Plan  · Blood pressure above goal  · Continue metoprolol, diuretics on hold  · Amlodipine started  · IV hydralazine p r n   · Monitor blood pressure      Atrial fibrillation (HCC)  Assessment & Plan  · Continue rate control meds  · Coumadin being withheld due to potential cardiac catheterization tomorrow, continue to hold due to cardiac catheterization  · Continue heparin drip    VTE Pharmacologic Prophylaxis:   Pharmacologic: Heparin Drip  Mechanical VTE Prophylaxis in Place: Yes    Review of Systems:    Review of Systems   Constitutional: Negative for chills and fever  HENT: Negative for ear pain and sore throat  Eyes: Negative for pain and visual disturbance  Respiratory: Negative for cough and shortness of breath  Cardiovascular: Negative for chest pain and palpitations  Gastrointestinal: Negative for abdominal pain and vomiting  Genitourinary: Negative for dysuria and hematuria  Musculoskeletal: Negative for arthralgias and back pain  Skin: Negative for color change and rash  Neurological: Negative for seizures and syncope  All other systems reviewed and are negative  Past Medical and Surgical History:     History reviewed  No pertinent past medical history  Past Surgical History:   Procedure Laterality Date   • CARDIAC SURGERY         Patient Centered Rounds: I have performed bedside rounds with nursing staff today  Discussions with Specialists or Other Care Team Provider:  Case management    Education and Discussions with Family / Patient:  Patient    Time Spent for Care: 45 minutes  More than 50% of total time spent on counseling and coordination of care as described above      Current Length of Stay: 6 day(s)    Current Patient Status: Inpatient   Certification Statement: The patient will continue to require additional inpatient hospital stay due to Plan for cardiac catheterization tomorrow    Discharge Plan:  Likely tomorrow pending cardiac cath     Code Status: Level 1 - Full Code      Subjective:   Patient denies any complaints    Objective:     Vitals:   Temp (24hrs), Av °F (36 7 °C), Min:97 9 °F (36 6 °C), Max:98 1 °F (36 7 °C)    Temp:  [97 9 °F (36 6 °C)-98 1 °F (36 7 °C)] 97 9 °F (36 6 °C)  HR:  [72-88] 72  Resp:  [16-18] 18  BP: (134-170)/(88-99) 170/92  SpO2:  [94 %-97 %] 97 %  Body mass index is 30 31 kg/m²  Input and Output Summary (last 24 hours): Intake/Output Summary (Last 24 hours) at 2022 1252  Last data filed at 2022 0631  Gross per 24 hour   Intake 637 22 ml   Output --   Net 637 22 ml       Physical Exam:     Physical Exam  Vitals and nursing note reviewed  Constitutional:       General: She is not in acute distress  Appearance: She is well-developed  She is not ill-appearing or diaphoretic  HENT:      Head: Normocephalic and atraumatic  Mouth/Throat:      Mouth: Mucous membranes are moist       Pharynx: Oropharynx is clear  Eyes:      General: No scleral icterus  Conjunctiva/sclera: Conjunctivae normal    Cardiovascular:      Rate and Rhythm: Normal rate and regular rhythm  Heart sounds: No murmur heard  Pulmonary:      Effort: Pulmonary effort is normal  No respiratory distress  Breath sounds: Normal breath sounds  No wheezing or rales  Abdominal:      General: Bowel sounds are normal       Palpations: Abdomen is soft  Tenderness: There is no abdominal tenderness  Musculoskeletal:      Cervical back: Normal range of motion and neck supple  Right lower leg: No edema  Left lower leg: No edema  Skin:     General: Skin is warm and dry  Neurological:      General: No focal deficit present  Mental Status: She is alert  Mental status is at baseline     Psychiatric:         Mood and Affect: Mood normal          Behavior: Behavior normal            Additional Data:     Labs:    Results from last 7 days   Lab Units 22  0618   WBC Thousand/uL 5 72   HEMOGLOBIN g/dL 12 2   HEMATOCRIT % 36 5   PLATELETS Thousands/uL 162   NEUTROS PCT % 59   LYMPHS PCT % 27   MONOS PCT % 9   EOS PCT % 4     Results from last 7 days   Lab Units 11/14/22  0540 11/08/22  0705 11/07/22  1600   SODIUM mmol/L 140   < > 142   POTASSIUM mmol/L 4 0   < > 3 5   CHLORIDE mmol/L 109*   < > 103   CO2 mmol/L 23   < > 28   BUN mg/dL 13   < > 26*   CREATININE mg/dL 1 12   < > 1 37*   ANION GAP mmol/L 8   < > 11   CALCIUM mg/dL 9 0   < > 9 7   ALBUMIN g/dL  --   --  4 1   TOTAL BILIRUBIN mg/dL  --   --  0 58   ALK PHOS U/L  --   --  69   ALT U/L  --   --  21   AST U/L  --   --  33   GLUCOSE RANDOM mg/dL 93   < > 129    < > = values in this interval not displayed  Results from last 7 days   Lab Units 11/14/22  0540   INR  1 85*             Results from last 7 days   Lab Units 11/07/22  2241   PROCALCITONIN ng/ml 0 06           * I Have Reviewed All Lab Data Listed Above  * Additional Pertinent Lab Tests Reviewed: Trevor 66 Admission Reviewed    Imaging:    Imaging Reports Reviewed Today Include: none  Imaging Personally Reviewed by Myself Includes:  none    Recent Cultures (last 7 days):           Last 24 Hours Medication List:   Current Facility-Administered Medications   Medication Dose Route Frequency Provider Last Rate   • amLODIPine  5 mg Oral Daily Mukesh Johnson MD     • aspirin  81 mg Oral Daily Sneha Melara MD     • heparin (porcine)  3-30 Units/kg/hr (Order-Specific) Intravenous Titrated NOE Belcher 11 Units/kg/hr (11/14/22 0818)   • heparin (porcine)  2,800 Units Intravenous Q1H PRN NOE Cooley     • heparin (porcine)  5,600 Units Intravenous Q1H PRN NOE Cooley     • hydrALAZINE  5 mg Intravenous Q6H PRN Ratna Gonzalez MD     • metoprolol succinate  25 mg Oral Daily Li Hogan PA-C     • sodium chloride  50 mL/hr Intravenous Continuous Mukesh Johnson MD          Today, Patient Was Seen By: Nelia Bishop MD    ** Please Note: Dictation voice to text software may have been used in the creation of this document   **

## 2022-11-14 NOTE — PLAN OF CARE
Problem: MOBILITY - ADULT  Goal: Maintain or return to baseline ADL function  Description: INTERVENTIONS:  -  Assess patient's ability to carry out ADLs; assess patient's baseline for ADL function and identify physical deficits which impact ability to perform ADLs (bathing, care of mouth/teeth, toileting, grooming, dressing, etc )  - Assess/evaluate cause of self-care deficits   - Assess range of motion  - Assess patient's mobility; develop plan if impaired  - Assess patient's need for assistive devices and provide as appropriate  - Encourage maximum independence but intervene and supervise when necessary  - Involve family in performance of ADLs  - Assess for home care needs following discharge   - Consider OT consult to assist with ADL evaluation and planning for discharge  - Provide patient education as appropriate  Outcome: Progressing  Goal: Maintains/Returns to pre admission functional level  Description: INTERVENTIONS:  - Perform BMAT or MOVE assessment daily    - Set and communicate daily mobility goal to care team and patient/family/caregiver  - Collaborate with rehabilitation services on mobility goals if consulted  - Perform Range of Motion  times a day  - Reposition patient every  hours    - Dangle patient  times a day  - Stand patient  times a day  - Ambulate patient  times a day  - Out of bed to chair  times a day   - Out of bed for meals  times a day  - Out of bed for toileting  - Record patient progress and toleration of activity level   Outcome: Progressing     Problem: Potential for Falls  Goal: Patient will remain free of falls  Description: INTERVENTIONS:  - Educate patient/family on patient safety including physical limitations  - Instruct patient to call for assistance with activity   - Consult OT/PT to assist with strengthening/mobility   - Keep Call bell within reach  - Keep bed low and locked with side rails adjusted as appropriate  - Keep care items and personal belongings within reach  - Initiate and maintain comfort rounds  - Make Fall Risk Sign visible to staff  - Offer Toileting every  Hours, in advance of need  - Initiate/Maintain alarm  - Obtain necessary fall risk management equipment:   - Apply yellow socks and bracelet for high fall risk patients  - Consider moving patient to room near nurses station  Outcome: Progressing     Problem: PAIN - ADULT  Goal: Verbalizes/displays adequate comfort level or baseline comfort level  Description: Interventions:  - Encourage patient to monitor pain and request assistance  - Assess pain using appropriate pain scale  - Administer analgesics based on type and severity of pain and evaluate response  - Implement non-pharmacological measures as appropriate and evaluate response  - Consider cultural and social influences on pain and pain management  - Notify physician/advanced practitioner if interventions unsuccessful or patient reports new pain  Outcome: Progressing     Problem: INFECTION - ADULT  Goal: Absence or prevention of progression during hospitalization  Description: INTERVENTIONS:  - Assess and monitor for signs and symptoms of infection  - Monitor lab/diagnostic results  - Monitor all insertion sites, i e  indwelling lines, tubes, and drains  - Monitor endotracheal if appropriate and nasal secretions for changes in amount and color  - [place] appropriate cooling/warming therapies per order  - Administer medications as ordered  - Instruct and encourage patient and family to use good hand hygiene technique  - Identify and instruct in appropriate isolation precautions for identified infection/condition  Outcome: Progressing  Goal: Absence of fever/infection during neutropenic period  Description: INTERVENTIONS:  - Monitor WBC    Outcome: Progressing     Problem: SAFETY ADULT  Goal: Maintain or return to baseline ADL function  Description: INTERVENTIONS:  -  Assess patient's ability to carry out ADLs; assess patient's baseline for ADL function and identify physical deficits which impact ability to perform ADLs (bathing, care of mouth/teeth, toileting, grooming, dressing, etc )  - Assess/evaluate cause of self-care deficits   - Assess range of motion  - Assess patient's mobility; develop plan if impaired  - Assess patient's need for assistive devices and provide as appropriate  - Encourage maximum independence but intervene and supervise when necessary  - Involve family in performance of ADLs  - Assess for home care needs following discharge   - Consider OT consult to assist with ADL evaluation and planning for discharge  - Provide patient education as appropriate  Outcome: Progressing  Goal: Maintains/Returns to pre admission functional level  Description: INTERVENTIONS:  - Perform BMAT or MOVE assessment daily    - Set and communicate daily mobility goal to care team and patient/family/caregiver  - Collaborate with rehabilitation services on mobility goals if consulted  - Perform Range of Motion  times a day  - Reposition patient every  hours    - Dangle patient times a day  - Stand patient  times a day  - Ambulate patient  times a day  - Out of bed to chair  times a day   - Out of bed for meals  times a day  - Out of bed for toileting  - Record patient progress and toleration of activity level   Outcome: Progressing  Goal: Patient will remain free of falls  Description: INTERVENTIONS:  - Educate patient/family on patient safety including physical limitations  - Instruct patient to call for assistance with activity   - Consult OT/PT to assist with strengthening/mobility   - Keep Call bell within reach  - Keep bed low and locked with side rails adjusted as appropriate  - Keep care items and personal belongings within reach  - Initiate and maintain comfort rounds  - Make Fall Risk Sign visible to staff  - Offer Toileting every  Hours, in advance of need  - Initiate/Maintain alarm  - Obtain necessary fall risk management equipment:   - Apply yellow socks and bracelet for high fall risk patients  - Consider moving patient to room near nurses station  Outcome: Progressing     Problem: DISCHARGE PLANNING  Goal: Discharge to home or other facility with appropriate resources  Description: INTERVENTIONS:  - Identify barriers to discharge w/patient and caregiver  - Arrange for needed discharge resources and transportation as appropriate  - Identify discharge learning needs (meds, wound care, etc )  - Arrange for interpretive services to assist at discharge as needed  - Refer to Case Management Department for coordinating discharge planning if the patient needs post-hospital services based on physician/advanced practitioner order or complex needs related to functional status, cognitive ability, or social support system  Outcome: Progressing     Problem: Knowledge Deficit  Goal: Patient/family/caregiver demonstrates understanding of disease process, treatment plan, medications, and discharge instructions  Description: Complete learning assessment and assess knowledge base    Interventions:  - Provide teaching at level of understanding  - Provide teaching via preferred learning methods  Outcome: Progressing     Problem: Prexisting or High Potential for Compromised Skin Integrity  Goal: Skin integrity is maintained or improved  Description: INTERVENTIONS:  - Identify patients at risk for skin breakdown  - Assess and monitor skin integrity  - Assess and monitor nutrition and hydration status  - Monitor labs   - Assess for incontinence   - Turn and reposition patient  - Assist with mobility/ambulation  - Relieve pressure over bony prominences  - Avoid friction and shearing  - Provide appropriate hygiene as needed including keeping skin clean and dry  - Evaluate need for skin moisturizer/barrier cream  - Collaborate with interdisciplinary team   - Patient/family teaching  - Consider wound care consult   Outcome: Progressing     Problem: CARDIOVASCULAR - ADULT  Goal: Maintains optimal cardiac output and hemodynamic stability  Description: INTERVENTIONS:  - Monitor I/O, vital signs and rhythm  - Monitor for S/S and trends of decreased cardiac output  - Administer and titrate ordered vasoactive medications to optimize hemodynamic stability  - Assess quality of pulses, skin color and temperature  - Assess for signs of decreased coronary artery perfusion  - Instruct patient to report change in severity of symptoms  Outcome: Progressing     Problem: METABOLIC, FLUID AND ELECTROLYTES - ADULT  Goal: Fluid balance maintained  Description: INTERVENTIONS:  - Monitor labs   - Monitor I/O and WT  - Instruct patient on fluid and nutrition as appropriate  - Assess for signs & symptoms of volume excess or deficit  Outcome: Progressing     Problem: HEMATOLOGIC - ADULT  Goal: Maintains hematologic stability  Description: INTERVENTIONS  - Assess for signs and symptoms of bleeding or hemorrhage  - Monitor labs  - Administer supportive blood products/factors as ordered and appropriate  Outcome: Progressing

## 2022-11-14 NOTE — ASSESSMENT & PLAN NOTE
· Concerning for angina  · Cardiology on board, appreciate recommendations  · Plan for cardiac catheterization tomorrow    Awaiting INR to be below 1 8

## 2022-11-14 NOTE — ASSESSMENT & PLAN NOTE
· Pt presented to ED after an episode of weakness with associated dizziness and lightheadedness around 1pm followed by 5 episodes nbnb vomiting and diarrhea  · CT head: no acute intracranial abnormality  · CT A/P: cardiomegaly with biatrial dilation  · CXR: no evidence of vascular congestion   · Cardiology following  patient now agreeable to cardiac catheterization      · Echo showed preserved ejection fraction of 55%, RV mildly dilated, biatrial dilation, bioprosthetic TAVR valve, and moderate pulmonary hypertension

## 2022-11-14 NOTE — ASSESSMENT & PLAN NOTE
· Continue rate control meds  · Coumadin being withheld due to potential cardiac catheterization tomorrow, continue to hold due to cardiac catheterization  · Continue heparin drip

## 2022-11-14 NOTE — PROGRESS NOTES
Cardiology Progress Note - Juliano Mobley 80 y o  female MRN: [id number]    Unit/Bed#: -01 Encounter: [id number]      Assessment/Plan:  1  Elevated troponin  • Troponin with peak of 425  • Patient complained of weakness, dizziness and nausea  • Echo reveals EF 55%; with mod TR, mod pulmonary HTN, and bioprosthetic aortic valve  • Plan was for cardiac cath today, however patient did not receive fluids that were ordered; will plan to give fluids overnight and cath tomorrow; discussed with nephrology    2  Chronic atrial fibrillation   • Rate currently averaging 80 per vitals; not on telemetry  • Continue Toprol-XL 25 mg  • Warfarin on hold; INR 1 85; Continue heparin GTT for AC    3  Hypertension  • Currently 170/92; continue to monitor  • Continue amlodipine 5 mg and Toprol-XL 25 mg    4  Aortic stenosis s/p TAVR x2  • Noted on echo; no evidence of regurgitation or significant stenosis    5  Chronic kidney disease stage 3  • CR 1 12; continue to monitor  • Management per nephrology and Internal Medicine      Subjective:   Patient seen and examined  No significant events overnight  Patient is sitting in chair comfortably with  at bedside  Denies new complaints at this time  Objective:     Vitals: Blood pressure 170/92, pulse 72, temperature 97 9 °F (36 6 °C), resp  rate 18, height 5' (1 524 m), weight 70 4 kg (155 lb 3 3 oz), SpO2 97 %  , Body mass index is 30 31 kg/m² ,   Orthostatic Blood Pressures    Flowsheet Row Most Recent Value   Blood Pressure 170/92 filed at 11/14/2022 5486   Patient Position - Orthostatic VS Sitting filed at 11/13/2022 1500            Intake/Output Summary (Last 24 hours) at 11/14/2022 1155  Last data filed at 11/14/2022 0631  Gross per 24 hour   Intake 637 22 ml   Output --   Net 637 22 ml         Physical Exam:  Physical Exam  Vitals and nursing note reviewed  Constitutional:       General: She is not in acute distress  Appearance: She is well-developed   She is not diaphoretic  HENT:      Head: Normocephalic and atraumatic  Nose: Nose normal    Eyes:      Conjunctiva/sclera: Conjunctivae normal    Cardiovascular:      Rate and Rhythm: Normal rate  Rhythm irregularly irregular  Heart sounds: Normal heart sounds  No murmur heard  Pulmonary:      Effort: Pulmonary effort is normal  No respiratory distress  Breath sounds: Normal breath sounds  No wheezing or rales  Chest:      Chest wall: No tenderness  Abdominal:      Palpations: Abdomen is soft  Tenderness: There is no abdominal tenderness  Musculoskeletal:      Cervical back: Neck supple  Right lower leg: No edema  Left lower leg: No edema  Skin:     General: Skin is warm and dry  Neurological:      Mental Status: She is alert and oriented to person, place, and time     Psychiatric:         Mood and Affect: Mood normal          Judgment: Judgment normal               Medications:      Current Facility-Administered Medications:   •  amLODIPine (NORVASC) tablet 5 mg, 5 mg, Oral, Daily, Laura Brooke MD, 5 mg at 11/14/22 0920  •  aspirin (ECOTRIN LOW STRENGTH) EC tablet 81 mg, 81 mg, Oral, Daily, Jana Garcia MD, 81 mg at 11/14/22 0920  •  heparin (porcine) 25,000 units in 0 45% NaCl 250 mL infusion (premix), 3-30 Units/kg/hr (Order-Specific), Intravenous, Titrated, NOE Cooley, Last Rate: 7 7 mL/hr at 11/14/22 0818, 11 Units/kg/hr at 11/14/22 0818  •  heparin (porcine) injection 2,800 Units, 2,800 Units, Intravenous, Q1H PRN, NOE Treviño, 2,800 Units at 11/14/22 0630  •  heparin (porcine) injection 5,600 Units, 5,600 Units, Intravenous, Q1H PRN, NOE Cooley  •  hydrALAZINE (APRESOLINE) injection 5 mg, 5 mg, Intravenous, Q6H PRN, Isabella Benson MD  •  metoprolol succinate (TOPROL-XL) 24 hr tablet 25 mg, 25 mg, Oral, Daily, Edilia Balbuena PA-C, 25 mg at 11/14/22 0920  •  sodium chloride 0 9 % infusion, 50 mL/hr, Intravenous, Continuous, Laura Brooke MD Labs & Results:     Results from last 7 days   Lab Units 11/08/22  1350 11/08/22  1151 11/08/22  0909 11/07/22  2112 11/07/22  1833 11/07/22  1600   HS TNI 0HR ng/L  --   --  350*  --   --  63*   HS TNI 2HR ng/L  --  417*  --   --  132*  --    HSTNI D2 ng/L  --  67*  --   --  69*  --    HS TNI 4HR ng/L 425*  --   --  169*  --   --    HSTNI D4 ng/L 75*  --   --  106*  --   --    NT-PRO BNP pg/mL  --   --   --   --   --  1,040*     Results from last 7 days   Lab Units 11/13/22  0618 11/12/22  1042 11/12/22  0358   WBC Thousand/uL 5 72 5 86 5 54   HEMOGLOBIN g/dL 12 2 12 8 11 8   HEMATOCRIT % 36 5 39 4 35 8   PLATELETS Thousands/uL 162 183 154         Results from last 7 days   Lab Units 11/14/22  0540 11/13/22  0618 11/12/22  1042 11/08/22  0705 11/07/22  1600   POTASSIUM mmol/L 4 0 4 1 4 0   < > 3 5   CHLORIDE mmol/L 109* 108 105   < > 103   CO2 mmol/L 23 24 25   < > 28   BUN mg/dL 13 12 16   < > 26*   CREATININE mg/dL 1 12 1 17 1 13   < > 1 37*   CALCIUM mg/dL 9 0 9 3 9 3   < > 9 7   ALK PHOS U/L  --   --   --   --  69   ALT U/L  --   --   --   --  21   AST U/L  --   --   --   --  33    < > = values in this interval not displayed  Results from last 7 days   Lab Units 11/14/22  0540 11/13/22  0618 11/13/22  0020 11/12/22  1806 11/12/22  1042   INR  1 85* 1 79*  --   --  1 82*   PTT seconds 53* 69* 80*   < > 191*    < > = values in this interval not displayed  Results from last 7 days   Lab Units 11/08/22  0705 11/07/22  1600   MAGNESIUM mg/dL 2 3 2 3       Vitals: Blood pressure 170/92, pulse 72, temperature 97 9 °F (36 6 °C), resp  rate 18, height 5' (1 524 m), weight 70 4 kg (155 lb 3 3 oz), SpO2 97 %  , Body mass index is 30 31 kg/m² ,   Orthostatic Blood Pressures    Flowsheet Row Most Recent Value   Blood Pressure 170/92 filed at 11/14/2022 0919   Patient Position - Orthostatic VS Sitting filed at 11/13/2022 6621          Systolic (99GJF), ISU:535 , Min:134 , RHO:619     Diastolic (55OSC), EGL:24, Min:88, Max:99        Intake/Output Summary (Last 24 hours) at 11/14/2022 1155  Last data filed at 11/14/2022 0631  Gross per 24 hour   Intake 637 22 ml   Output --   Net 637 22 ml       Invasive Devices  Report    Peripheral Intravenous Line  Duration           Peripheral IV 11/11/22 Left;Ventral (anterior) Wrist 2 days    Peripheral IV 11/13/22 Dorsal (posterior); Right Hand 1 day                  EKG:  Atrial fibrillation with nonspecific ST and T-wave abnormality      BP Readings from Last 3 Encounters:   11/14/22 170/92      Wt Readings from Last 3 Encounters:   11/14/22 70 4 kg (155 lb 3 3 oz)

## 2022-11-14 NOTE — PROGRESS NOTES
NEPHROLOGY PROGRESS NOTE    Patient: Katty Guzman               Sex: female          DOA: 11/7/2022  2:54 PM   YOB: 1940        Age:  80 y o         LOS:  LOS: 6 days       HPI     Patient with CKD this week need for cardiac catheterization    SUBJECTIVE     Patient is quite asymptomatic    Denies any complaint    No chest pain no palpitation or shortness of breath    No nausea no vomiting    No abdominal discomfort    CURRENT MEDICATIONS       Current Facility-Administered Medications:   •  amLODIPine (NORVASC) tablet 5 mg, 5 mg, Oral, Daily, Emily Porter MD, 5 mg at 11/14/22 0920  •  aspirin (ECOTRIN LOW STRENGTH) EC tablet 81 mg, 81 mg, Oral, Daily, Naomy Cherry MD, 81 mg at 11/14/22 0920  •  heparin (porcine) 25,000 units in 0 45% NaCl 250 mL infusion (premix), 3-30 Units/kg/hr (Order-Specific), Intravenous, Titrated, NOE Cooley, Last Rate: 7 7 mL/hr at 11/14/22 0818, 11 Units/kg/hr at 11/14/22 0818  •  heparin (porcine) injection 2,800 Units, 2,800 Units, Intravenous, Q1H PRN, NOE Mayes, 2,800 Units at 11/14/22 0630  •  heparin (porcine) injection 5,600 Units, 5,600 Units, Intravenous, Q1H PRN, NOE Cooley  •  hydrALAZINE (APRESOLINE) injection 5 mg, 5 mg, Intravenous, Q6H PRN, William Zhong MD  •  metoprolol succinate (TOPROL-XL) 24 hr tablet 25 mg, 25 mg, Oral, Daily, Saloni Miller PA-C, 25 mg at 11/14/22 0920  •  sodium chloride 0 9 % infusion, 50 mL/hr, Intravenous, Continuous, Emily Porter MD, Stopped at 11/14/22 1120    OBJECTIVE     Current Weight: Weight - Scale: 70 4 kg (155 lb 3 3 oz)  Vitals:    11/14/22 0919   BP: 170/92   Pulse: 72   Resp:    Temp:    SpO2: 97%       Intake/Output Summary (Last 24 hours) at 11/14/2022 1145  Last data filed at 11/14/2022 0631  Gross per 24 hour   Intake 637 22 ml   Output --   Net 637 22 ml       PHYSICAL EXAMINATION     Physical Exam  Constitutional:       General: She is not in acute distress  Appearance: She is well-developed  HENT:      Head: Normocephalic  Eyes:      General: No scleral icterus  Conjunctiva/sclera: Conjunctivae normal    Neck:      Vascular: No JVD  Cardiovascular:      Rate and Rhythm: Normal rate  Heart sounds: Normal heart sounds  Pulmonary:      Effort: Pulmonary effort is normal       Breath sounds: No wheezing  Abdominal:      Palpations: Abdomen is soft  Tenderness: There is no abdominal tenderness  Musculoskeletal:         General: Normal range of motion  Cervical back: Neck supple  Skin:     General: Skin is warm  Findings: No rash  Neurological:      Mental Status: She is alert and oriented to person, place, and time  Psychiatric:         Behavior: Behavior normal           LAB RESULTS     Results from last 7 days   Lab Units 11/14/22  0540 11/13/22  0618 11/12/22  1042 11/12/22  0358 11/11/22  0513 11/10/22  0609 11/09/22  0708 11/08/22  0705 11/07/22  1600   WBC Thousand/uL  --  5 72 5 86 5 54 4 99 5 49 5 84 7 95 8 24   HEMOGLOBIN g/dL  --  12 2 12 8 11 8 11 9 12 2 12 8 13 4 13 3   HEMATOCRIT %  --  36 5 39 4 35 8 35 2 37 4 39 3 40 0 40 7   PLATELETS Thousands/uL  --  162 183 154 149 172 171 194 166   POTASSIUM mmol/L 4 0 4 1 4 0 4 0 4 2 4 1 3 9 3 8 3 5   CHLORIDE mmol/L 109* 108 105 108 105 105 103 101 103   CO2 mmol/L 23 24 25 28 28 32 29 32 28   BUN mg/dL 13 12 16 18 22 21 26* 24 26*   CREATININE mg/dL 1 12 1 17 1 13 1 13 1 25 1 36* 1 50* 1 60* 1 37*   EGFR ml/min/1 73sq m 45 43 45 45 40 36 32 29 35   CALCIUM mg/dL 9 0 9 3 9 3 9 1 9 0 8 9 9 0 9 4 9 7   MAGNESIUM mg/dL  --   --   --   --   --   --   --  2 3 2 3       RADIOLOGY RESULTS      No results found for this or any previous visit  Results for orders placed during the hospital encounter of 11/07/22    XR chest 2 views    Narrative  CHEST    INDICATION:   dizziness    Weakness    COMPARISON:  None    EXAM PERFORMED/VIEWS:  XR CHEST PA & LATERAL  Images: 2    FINDINGS:    Cardiomediastinal silhouette appears enlarged  A median sternotomy has been performed  Prosthetic aortic valve  The lungs are clear  No pneumothorax or pleural effusion  Osseous structures appear within normal limits for patient age  Impression  No acute cardiopulmonary disease  Workstation performed: [id number]    No results found for this or any previous visit  No results found for this or any previous visit  No results found for this or any previous visit  No results found for this or any previous visit  PLAN / RECOMMENDATIONS      CKD stage 3:  Seems to be stable at this point    Coronary artery disease:  Patient need cardiac catheterization  She was supposed to start getting IV fluid last night which I had order was somehow she did not get any IV fluid  Patient seems to comfortable but in view of CKD see will require IV hydration so will advised cardiology will do catheterization tomorrow and I will start IV fluid again tonight    Hypertension:  Blood pressure is on higher side  Will continue to monitor    Will follow    Anderson Erazo MD  Nephrology  11/14/2022        Portions of the record may have been created with voice recognition software  Occasional wrong word or "sound a like" substitutions may have occurred due to the inherent limitations of voice recognition software  Read the chart carefully and recognize, using context, where substitutions have occurred

## 2022-11-14 NOTE — ASSESSMENT & PLAN NOTE
Lab Results   Component Value Date    EGFR 45 11/14/2022    EGFR 43 11/13/2022    EGFR 45 11/12/2022    CREATININE 1 12 11/14/2022    CREATININE 1 17 11/13/2022    CREATININE 1 13 11/12/2022     · Cr at baseline, continue to monitor  · Nephrology on board for renal optimization prior to cardiac catheterization

## 2022-11-14 NOTE — ASSESSMENT & PLAN NOTE
Wt Readings from Last 3 Encounters:   11/14/22 70 4 kg (155 lb 3 3 oz)     Last echo 1/22: EF>70%,   · Echo results under weakness  · Monitor I/Os, daily weights  · Monitor electrolytes  · Continue metoprolol  · Diuretics on hold in anticipation of cardiac catheterization on Monday

## 2022-11-15 LAB
ANION GAP SERPL CALCULATED.3IONS-SCNC: 10 MMOL/L (ref 4–13)
APTT PPP: 63 SECONDS (ref 23–37)
BUN SERPL-MCNC: 13 MG/DL (ref 5–25)
CALCIUM SERPL-MCNC: 8.4 MG/DL (ref 8.3–10.1)
CHLORIDE SERPL-SCNC: 110 MMOL/L (ref 96–108)
CO2 SERPL-SCNC: 21 MMOL/L (ref 21–32)
CREAT SERPL-MCNC: 0.99 MG/DL (ref 0.6–1.3)
GFR SERPL CREATININE-BSD FRML MDRD: 53 ML/MIN/1.73SQ M
GLUCOSE SERPL-MCNC: 90 MG/DL (ref 65–140)
INR PPP: 1.57 (ref 0.84–1.19)
POTASSIUM SERPL-SCNC: 3.8 MMOL/L (ref 3.5–5.3)
PROTHROMBIN TIME: 18.4 SECONDS (ref 11.6–14.5)
SODIUM SERPL-SCNC: 141 MMOL/L (ref 135–147)

## 2022-11-15 PROCEDURE — B2111ZZ FLUOROSCOPY OF MULTIPLE CORONARY ARTERIES USING LOW OSMOLAR CONTRAST: ICD-10-PCS | Performed by: STUDENT IN AN ORGANIZED HEALTH CARE EDUCATION/TRAINING PROGRAM

## 2022-11-15 DEVICE — ANGIO-SEAL VIP VASCULAR CLOSURE DEVICE
Type: IMPLANTABLE DEVICE | Status: FUNCTIONAL
Brand: ANGIO-SEAL

## 2022-11-15 RX ORDER — LIDOCAINE HYDROCHLORIDE 10 MG/ML
INJECTION, SOLUTION EPIDURAL; INFILTRATION; INTRACAUDAL; PERINEURAL AS NEEDED
Status: DISCONTINUED | OUTPATIENT
Start: 2022-11-15 | End: 2022-11-15 | Stop reason: HOSPADM

## 2022-11-15 RX ORDER — SODIUM CHLORIDE 9 MG/ML
50 INJECTION, SOLUTION INTRAVENOUS CONTINUOUS
Status: DISPENSED | OUTPATIENT
Start: 2022-11-15 | End: 2022-11-15

## 2022-11-15 RX ORDER — NITROGLYCERIN 20 MG/100ML
INJECTION INTRAVENOUS AS NEEDED
Status: DISCONTINUED | OUTPATIENT
Start: 2022-11-15 | End: 2022-11-15 | Stop reason: HOSPADM

## 2022-11-15 RX ORDER — FENTANYL CITRATE 50 UG/ML
INJECTION, SOLUTION INTRAMUSCULAR; INTRAVENOUS AS NEEDED
Status: DISCONTINUED | OUTPATIENT
Start: 2022-11-15 | End: 2022-11-15 | Stop reason: HOSPADM

## 2022-11-15 RX ORDER — SODIUM CHLORIDE 9 MG/ML
75 INJECTION, SOLUTION INTRAVENOUS CONTINUOUS
Status: DISCONTINUED | OUTPATIENT
Start: 2022-11-15 | End: 2022-11-15

## 2022-11-15 RX ORDER — HEPARIN SODIUM 1000 [USP'U]/ML
INJECTION, SOLUTION INTRAVENOUS; SUBCUTANEOUS AS NEEDED
Status: DISCONTINUED | OUTPATIENT
Start: 2022-11-15 | End: 2022-11-15 | Stop reason: HOSPADM

## 2022-11-15 RX ORDER — MIDAZOLAM HYDROCHLORIDE 2 MG/2ML
INJECTION, SOLUTION INTRAMUSCULAR; INTRAVENOUS AS NEEDED
Status: DISCONTINUED | OUTPATIENT
Start: 2022-11-15 | End: 2022-11-15 | Stop reason: HOSPADM

## 2022-11-15 RX ADMIN — METOPROLOL SUCCINATE 25 MG: 25 TABLET, EXTENDED RELEASE ORAL at 08:04

## 2022-11-15 RX ADMIN — ASPIRIN 81 MG: 81 TABLET, COATED ORAL at 08:06

## 2022-11-15 RX ADMIN — AMLODIPINE BESYLATE 5 MG: 5 TABLET ORAL at 08:05

## 2022-11-15 RX ADMIN — HEPARIN SODIUM 11 UNITS/KG/HR: 10000 INJECTION, SOLUTION INTRAVENOUS at 06:39

## 2022-11-15 NOTE — ASSESSMENT & PLAN NOTE
Lab Results   Component Value Date    EGFR 53 11/15/2022    EGFR 45 11/14/2022    EGFR 43 11/13/2022    CREATININE 0 99 11/15/2022    CREATININE 1 12 11/14/2022    CREATININE 1 17 11/13/2022     · Cr at baseline, continue to monitor  · Nephrology on board for renal optimization

## 2022-11-15 NOTE — PROGRESS NOTES
NEPHROLOGY PROGRESS NOTE    Patient: Eusebio Albarado               Sex: female          DOA: 11/7/2022  2:54 PM   YOB: 1940        Age:  80 y o         LOS:  LOS: 7 days       HPI     Patient with CKD who also has coronary artery disease and need for cardiac catheterization    SUBJECTIVE     Overall doing well    Denies any acute complaint    No chest pain no palpitation    CURRENT MEDICATIONS       Current Facility-Administered Medications:   •  amLODIPine (NORVASC) tablet 5 mg, 5 mg, Oral, Daily, Anne Galvez MD, 5 mg at 11/15/22 0805  •  aspirin (ECOTRIN LOW STRENGTH) EC tablet 81 mg, 81 mg, Oral, Daily, Melonie Coombs MD, 81 mg at 11/15/22 1850  •  fentanyl citrate (PF) 100 MCG/2ML, , , PRN, Brayan Stewart MD, 50 mcg at 11/15/22 1039  •  heparin (porcine) 25,000 units in 0 45% NaCl 250 mL infusion (premix), 3-30 Units/kg/hr (Order-Specific), Intravenous, Titrated, NOE Cooley, Last Rate: 7 7 mL/hr at 11/15/22 0930, 11 Units/kg/hr at 11/15/22 0930  •  heparin (porcine) injection 2,800 Units, 2,800 Units, Intravenous, Q1H PRN, NOE Pierce, 2,800 Units at 11/14/22 0630  •  heparin (porcine) injection 5,600 Units, 5,600 Units, Intravenous, Q1H PRN, NOE Cooley  •  heparin (porcine) injection, , , PRN, Brayan Stewart MD, 4,000 Units at 11/15/22 1055  •  hydrALAZINE (APRESOLINE) injection 5 mg, 5 mg, Intravenous, Q6H PRN, Galindo Ochoa MD  •  lidocaine (PF) (XYLOCAINE-MPF) 1 % injection, , , PRN, Brayan Stewart MD, 1 mL at 11/15/22 1045  •  metoprolol succinate (TOPROL-XL) 24 hr tablet 25 mg, 25 mg, Oral, Daily, Evita Yeager PA-C, 25 mg at 11/15/22 7080  •  midazolam (VERSED) injection, , , PRN, Brayan Stewart MD, 2 mg at 11/15/22 1039  •  nitroGLYcerin 200 mcg/mL IA bolus, , , PRN, Brayan Stewart MD, 200 mcg at 11/15/22 1053  •  sodium chloride 0 9 % infusion, 50 mL/hr, Intravenous, Continuous, Anne Galvez MD, Last Rate: 50 mL/hr at 11/14/22 2203, 50 mL/hr at 11/14/22 2203    OBJECTIVE     Current Weight: Weight - Scale: 69 9 kg (154 lb 1 6 oz)  Vitals:    11/15/22 0715   BP: 157/95   Pulse: 95   Resp: 17   Temp: 97 7 °F (36 5 °C)   SpO2: 93%       Intake/Output Summary (Last 24 hours) at 11/15/2022 1110  Last data filed at 11/15/2022 1924  Gross per 24 hour   Intake 804 99 ml   Output 1100 ml   Net -295 01 ml       PHYSICAL EXAMINATION     Physical Exam  Constitutional:       General: She is not in acute distress  Appearance: She is well-developed  HENT:      Head: Normocephalic  Eyes:      General: No scleral icterus  Conjunctiva/sclera: Conjunctivae normal    Neck:      Vascular: No JVD  Cardiovascular:      Rate and Rhythm: Normal rate  Heart sounds: Normal heart sounds  Pulmonary:      Effort: Pulmonary effort is normal       Breath sounds: No wheezing  Abdominal:      Palpations: Abdomen is soft  Tenderness: There is no abdominal tenderness  Musculoskeletal:         General: Normal range of motion  Cervical back: Neck supple  Skin:     General: Skin is warm  Findings: No rash  Neurological:      Mental Status: She is alert and oriented to person, place, and time     Psychiatric:         Behavior: Behavior normal           LAB RESULTS     Results from last 7 days   Lab Units 11/15/22  0528 11/14/22  0540 11/13/22  0618 11/12/22  1042 11/12/22  0358 11/11/22  0513 11/10/22  0609 11/09/22  0708   WBC Thousand/uL  --   --  5 72 5 86 5 54 4 99 5 49 5 84   HEMOGLOBIN g/dL  --   --  12 2 12 8 11 8 11 9 12 2 12 8   HEMATOCRIT %  --   --  36 5 39 4 35 8 35 2 37 4 39 3   PLATELETS Thousands/uL  --   --  162 183 154 149 172 171   POTASSIUM mmol/L 3 8 4 0 4 1 4 0 4 0 4 2 4 1 3 9   CHLORIDE mmol/L 110* 109* 108 105 108 105 105 103   CO2 mmol/L 21 23 24 25 28 28 32 29   BUN mg/dL 13 13 12 16 18 22 21 26*   CREATININE mg/dL 0 99 1 12 1 17 1 13 1 13 1 25 1 36* 1 50*   EGFR ml/min/1 73sq m 53 45 43 45 45 40 36 32   CALCIUM mg/dL 8 4 9 0 9 3 9 3 9 1 9 0 8 9 9 0       RADIOLOGY RESULTS      No results found for this or any previous visit  Results for orders placed during the hospital encounter of 11/07/22    XR chest 2 views    Narrative  CHEST    INDICATION:   dizziness  Weakness    COMPARISON:  None    EXAM PERFORMED/VIEWS:  XR CHEST PA & LATERAL  Images: 2    FINDINGS:    Cardiomediastinal silhouette appears enlarged  A median sternotomy has been performed  Prosthetic aortic valve  The lungs are clear  No pneumothorax or pleural effusion  Osseous structures appear within normal limits for patient age  Impression  No acute cardiopulmonary disease  Workstation performed: [id number]    No results found for this or any previous visit  No results found for this or any previous visit  No results found for this or any previous visit  No results found for this or any previous visit  PLAN / RECOMMENDATIONS      CKD stage 3:  Seems to be stable    Coronary artery disease: For cardiac catheterization today  Patient did got IV fluid overnight post part of the renal protection    Hypertension:  Very well control    Will continue to monitor    Laura Brooke MD  Nephrology  11/15/2022        Portions of the record may have been created with voice recognition software  Occasional wrong word or "sound a like" substitutions may have occurred due to the inherent limitations of voice recognition software  Read the chart carefully and recognize, using context, where substitutions have occurred

## 2022-11-15 NOTE — ASSESSMENT & PLAN NOTE
· Concerning for angina  · Cardiology on board, appreciate recommendations  · Status post cardiac cath today  · Continue IV hydration for CKD  · Follow up cath report

## 2022-11-15 NOTE — ASSESSMENT & PLAN NOTE
· Continue rate control meds  · Coumadin being withheld due to cardiac catheterization, continue to hold due to cardiac catheterization  · Continue heparin drip  · Plan to bridge back to coumadin in AM

## 2022-11-15 NOTE — PROGRESS NOTES
Cardiology Progress Note - Addi Sanders 80 y o  female MRN: [id number]    Unit/Bed#: -01 Encounter: [id number]      Assessment/Plan:  1  Elevated troponin  · Troponin with peak of 425  · Patient complained of weakness, dizziness and nausea  · Echo reveals EF 55%; with mod TR, mod pulmonary HTN, and bioprosthetic aortic valve  · Cardiac catheterization revealed no significant CAD  · Will recheck formal access point in the a m      2  Chronic atrial fibrillation   · Rate currently averaging 80 per vitals; not on telemetry  · Continue Toprol-XL 25 mg  · Recommend discontinuing Heparin GTT and restarting Warfarin     3  Hypertension  · Currently 142/94; continue to monitor  · Continue amlodipine 5 mg and Toprol-XL 25 mg     4  Aortic stenosis s/p TAVR x2  · Noted on echo; no evidence of regurgitation or significant stenosis     5  Chronic kidney disease stage 3  · CR 0 99; continue to monitor  · Management per nephrology and Internal Medicine      Subjective:   Patient seen and examined  No significant events overnight  Denies new complaints at this time  Objective:     Vitals: Blood pressure 142/94, pulse 83, temperature 97 5 °F (36 4 °C), resp  rate 19, height 5' (1 524 m), weight 69 9 kg (154 lb 1 6 oz), SpO2 95 %  , Body mass index is 30 1 kg/m² ,   Orthostatic Blood Pressures    Flowsheet Row Most Recent Value   Blood Pressure 142/94 filed at 11/15/2022 1535   Patient Position - Orthostatic VS Sitting filed at 11/13/2022 1500            Intake/Output Summary (Last 24 hours) at 11/15/2022 1709  Last data filed at 11/15/2022 1420  Gross per 24 hour   Intake 804 99 ml   Output 1555 ml   Net -750 01 ml         Physical Exam:  Physical Exam  Vitals and nursing note reviewed  Constitutional:       General: She is not in acute distress  Appearance: She is well-developed  She is not diaphoretic  HENT:      Head: Normocephalic and atraumatic        Nose: Nose normal    Eyes:      Conjunctiva/sclera: Conjunctivae normal    Cardiovascular:      Rate and Rhythm: Normal rate and regular rhythm  Pulses: Normal pulses  Heart sounds: Normal heart sounds  No murmur heard  Pulmonary:      Effort: Pulmonary effort is normal  No respiratory distress  Breath sounds: Normal breath sounds  No wheezing or rales  Chest:      Chest wall: No tenderness  Abdominal:      Palpations: Abdomen is soft  Tenderness: There is no abdominal tenderness  Musculoskeletal:      Cervical back: Neck supple  Right lower leg: No edema  Left lower leg: No edema  Skin:     General: Skin is warm and dry  Neurological:      Mental Status: She is alert and oriented to person, place, and time     Psychiatric:         Mood and Affect: Mood normal          Judgment: Judgment normal               Medications:      Current Facility-Administered Medications:   •  amLODIPine (NORVASC) tablet 5 mg, 5 mg, Oral, Daily, Laura Brooke MD, 5 mg at 11/15/22 0805  •  aspirin (ECOTRIN LOW STRENGTH) EC tablet 81 mg, 81 mg, Oral, Daily, Jana Garcia MD, 81 mg at 11/15/22 0806  •  heparin (porcine) 25,000 units in 0 45% NaCl 250 mL infusion (premix), 3-30 Units/kg/hr (Order-Specific), Intravenous, Titrated, NOE Cooley, Last Rate: 7 7 mL/hr at 11/15/22 0930, 11 Units/kg/hr at 11/15/22 0930  •  heparin (porcine) injection 2,800 Units, 2,800 Units, Intravenous, Q1H PRN, NOE Treviño, 2,800 Units at 11/14/22 0630  •  heparin (porcine) injection 5,600 Units, 5,600 Units, Intravenous, Q1H PRN, NOE Cooley  •  hydrALAZINE (APRESOLINE) injection 5 mg, 5 mg, Intravenous, Q6H PRN, Isabella Benson MD  •  metoprolol succinate (TOPROL-XL) 24 hr tablet 25 mg, 25 mg, Oral, Daily, Edilia Balbuena PA-C, 25 mg at 11/15/22 0804     Labs & Results:         Results from last 7 days   Lab Units 11/13/22  0618 11/12/22  1042 11/12/22  0358   WBC Thousand/uL 5 72 5 86 5 54   HEMOGLOBIN g/dL 12 2 12 8 11 8   HEMATOCRIT % 36 5 39 4 35 8   PLATELETS Thousands/uL 162 183 154         Results from last 7 days   Lab Units 11/15/22  0528 11/14/22  0540 11/13/22  0618   POTASSIUM mmol/L 3 8 4 0 4 1   CHLORIDE mmol/L 110* 109* 108   CO2 mmol/L 21 23 24   BUN mg/dL 13 13 12   CREATININE mg/dL 0 99 1 12 1 17   CALCIUM mg/dL 8 4 9 0 9 3     Results from last 7 days   Lab Units 11/15/22  0528 11/14/22  2130 11/14/22  1307 11/14/22  0540 11/13/22  0618   INR  1 57*  --   --  1 85* 1 79*   PTT seconds 63* 60* 88* 53* 69*           Vitals: Blood pressure 142/94, pulse 83, temperature 97 5 °F (36 4 °C), resp  rate 19, height 5' (1 524 m), weight 69 9 kg (154 lb 1 6 oz), SpO2 95 %  , Body mass index is 30 1 kg/m² ,   Orthostatic Blood Pressures    Flowsheet Row Most Recent Value   Blood Pressure 142/94 filed at 11/15/2022 1535   Patient Position - Orthostatic VS Sitting filed at 11/13/2022 1440          Systolic (73VJK), SRINIVASA:339 , Min:121 , PAULINA:279     Diastolic (33WAG), AIX:27, Min:82, Max:99        Intake/Output Summary (Last 24 hours) at 11/15/2022 1709  Last data filed at 11/15/2022 1420  Gross per 24 hour   Intake 804 99 ml   Output 1555 ml   Net -750 01 ml       Invasive Devices  Report    Peripheral Intravenous Line  Duration           Peripheral IV 11/11/22 Left;Ventral (anterior) Wrist 4 days    Peripheral IV 11/13/22 Dorsal (posterior); Right Hand 2 days                  EKG:  Atrial fibrillation with nonspecific ST and T-wave abnormality      BP Readings from Last 3 Encounters:   11/15/22 142/94      Wt Readings from Last 3 Encounters:   11/15/22 69 9 kg (154 lb 1 6 oz)

## 2022-11-15 NOTE — PROGRESS NOTES
[address] [facility]  Progress Note - Judson Palomo 1940, 80 y o  female MRN: [id number]  Unit/Bed#: -01 Encounter: [id number]  Primary Care Provider: No primary care provider on file  Date and time admitted to hospital: 11/7/2022  2:54 PM    * Elevated troponin  Assessment & Plan  · Concerning for angina  · Cardiology on board, appreciate recommendations  · Status post cardiac cath today  · Continue IV hydration for CKD  · Follow up cath report     CKD (chronic kidney disease)  Assessment & Plan  Lab Results   Component Value Date    EGFR 53 11/15/2022    EGFR 45 11/14/2022    EGFR 43 11/13/2022    CREATININE 0 99 11/15/2022    CREATININE 1 12 11/14/2022    CREATININE 1 17 11/13/2022     · Cr at baseline, continue to monitor  · Nephrology on board for renal optimization     Chronic diastolic heart failure (HCC)  Assessment & Plan  Wt Readings from Last 3 Encounters:   11/15/22 69 9 kg (154 lb 1 6 oz)     Last echo 1/22: EF>70%,   · Echo results under weakness  · Monitor I/Os, daily weights  · Monitor electrolytes  · Continue metoprolol  · Diuretics on hold for cardiac catheterization     Weakness  Assessment & Plan  · Pt presented to ED after an episode of weakness with associated dizziness and lightheadedness around 1pm followed by 5 episodes nbnb vomiting and diarrhea  · CT head: no acute intracranial abnormality  · CT A/P: cardiomegaly with biatrial dilation  · CXR: no evidence of vascular congestion   · Cardiology following  patient now agreeable to cardiac catheterization      · Echo showed preserved ejection fraction of 55%, RV mildly dilated, biatrial dilation, bioprosthetic TAVR valve, and moderate pulmonary hypertension    Hypertension  Assessment & Plan  · Adequately controlled on current regimen  · Monitor       Atrial fibrillation (HCC)  Assessment & Plan  · Continue rate control meds  · Coumadin being withheld due to cardiac catheterization, continue to hold due to cardiac catheterization  · Continue heparin drip  · Plan to bridge back to coumadin in AM        VTE Pharmacologic Prophylaxis: VTE Score: 4 Moderate Risk (Score 3-4) - Pharmacological DVT Prophylaxis Ordered: heparin drip  Patient Centered Rounds: I performed bedside rounds with nursing staff today  Discussions with Specialists or Other Care Team Provider: felicita    Time Spent for Care: 30 minutes  More than 50% of total time spent on counseling and coordination of care as described above  Current Length of Stay: 7 day(s)  Current Patient Status: Inpatient   Certification Statement: The patient will continue to require additional inpatient hospital stay due to renal optimization post cardiac cath  Discharge Plan: Anticipate discharge in 24-48 hrs to home  Code Status: Level 1 - Full Code    Subjective:   Patient underwent cardiac cath today  Objective:     Vitals:   Temp (24hrs), Av 9 °F (36 6 °C), Min:97 7 °F (36 5 °C), Max:98 1 °F (36 7 °C)    Temp:  [97 7 °F (36 5 °C)-98 1 °F (36 7 °C)] 98 1 °F (36 7 °C)  HR:  [73-96] 94  Resp:  [15-17] 16  BP: (121-159)/(82-99) 137/97  SpO2:  [91 %-98 %] 96 %  Body mass index is 30 1 kg/m²  Input and Output Summary (last 24 hours): Intake/Output Summary (Last 24 hours) at 11/15/2022 1535  Last data filed at 11/15/2022 1420  Gross per 24 hour   Intake 804 99 ml   Output 1555 ml   Net -750 01 ml       Physical Exam:   Physical Exam  Constitutional:       General: She is not in acute distress  Appearance: Normal appearance  She is not toxic-appearing  Cardiovascular:      Rate and Rhythm: Normal rate and regular rhythm  Heart sounds: Normal heart sounds  No murmur heard  Pulmonary:      Effort: Pulmonary effort is normal  No respiratory distress  Breath sounds: Normal breath sounds  No wheezing  Abdominal:      General: Abdomen is flat  There is no distension  Palpations: Abdomen is soft  Tenderness: There is no abdominal tenderness  Neurological:      General: No focal deficit present  Mental Status: She is alert and oriented to person, place, and time  Mental status is at baseline  Motor: No weakness  Additional Data:     Labs:  Results from last 7 days   Lab Units 11/13/22  0618   WBC Thousand/uL 5 72   HEMOGLOBIN g/dL 12 2   HEMATOCRIT % 36 5   PLATELETS Thousands/uL 162   NEUTROS PCT % 59   LYMPHS PCT % 27   MONOS PCT % 9   EOS PCT % 4     Results from last 7 days   Lab Units 11/15/22  0528   SODIUM mmol/L 141   POTASSIUM mmol/L 3 8   CHLORIDE mmol/L 110*   CO2 mmol/L 21   BUN mg/dL 13   CREATININE mg/dL 0 99   ANION GAP mmol/L 10   CALCIUM mg/dL 8 4   GLUCOSE RANDOM mg/dL 90     Results from last 7 days   Lab Units 11/15/22  0528   INR  1 57*                   Lines/Drains:  Invasive Devices  Report    Peripheral Intravenous Line  Duration           Peripheral IV 11/11/22 Left;Ventral (anterior) Wrist 4 days    Peripheral IV 11/13/22 Dorsal (posterior); Right Hand 2 days                      Imaging: No pertinent imaging reviewed      Recent Cultures (last 7 days):         Last 24 Hours Medication List:   Current Facility-Administered Medications   Medication Dose Route Frequency Provider Last Rate   • amLODIPine  5 mg Oral Daily Coy Mello MD     • aspirin  81 mg Oral Daily Alesha Dwyer MD     • heparin (porcine)  3-30 Units/kg/hr (Order-Specific) Intravenous Titrated NOE Demarco 11 Units/kg/hr (11/15/22 0930)   • heparin (porcine)  2,800 Units Intravenous Q1H PRN NOE Cooley     • heparin (porcine)  5,600 Units Intravenous Q1H PRN NOE Cooley     • hydrALAZINE  5 mg Intravenous Q6H PRN Mateo Gomez MD     • metoprolol succinate  25 mg Oral Daily Ha Neil PA-C     • sodium chloride  50 mL/hr Intravenous Continuous Chasidy Garcia PA-C 50 mL/hr (11/15/22 1225)        Today, Patient Was Seen By: Ted Posada    **Please Note: This note may have been constructed using a voice recognition system  **

## 2022-11-15 NOTE — ASSESSMENT & PLAN NOTE
Wt Readings from Last 3 Encounters:   11/15/22 69 9 kg (154 lb 1 6 oz)     Last echo 1/22: EF>70%,   · Echo results under weakness  · Monitor I/Os, daily weights  · Monitor electrolytes  · Continue metoprolol  · Diuretics on hold for cardiac catheterization

## 2022-11-15 NOTE — PLAN OF CARE
Problem: MOBILITY - ADULT  Goal: Maintain or return to baseline ADL function  Description: INTERVENTIONS:  -  Assess patient's ability to carry out ADLs; assess patient's baseline for ADL function and identify physical deficits which impact ability to perform ADLs (bathing, care of mouth/teeth, toileting, grooming, dressing, etc )  - Assess/evaluate cause of self-care deficits   - Assess range of motion  - Assess patient's mobility; develop plan if impaired  - Assess patient's need for assistive devices and provide as appropriate  - Encourage maximum independence but intervene and supervise when necessary  - Involve family in performance of ADLs  - Assess for home care needs following discharge   - Consider OT consult to assist with ADL evaluation and planning for discharge  - Provide patient education as appropriate  Outcome: Progressing  Goal: Maintains/Returns to pre admission functional level  Description: INTERVENTIONS:  - Perform BMAT or MOVE assessment daily    - Set and communicate daily mobility goal to care team and patient/family/caregiver     - Collaborate with rehabilitation services on mobility goals if consulted  - Ambulate patient 3 times a day  - Out of bed to chair 3 times a day   - Out of bed for meals 3 times a day  - Out of bed for toileting  - Record patient progress and toleration of activity level   Outcome: Progressing     Problem: Potential for Falls  Goal: Patient will remain free of falls  Description: INTERVENTIONS:  - Educate patient/family on patient safety including physical limitations  - Instruct patient to call for assistance with activity   - Consult OT/PT to assist with strengthening/mobility   - Keep Call bell within reach  - Keep bed low and locked with side rails adjusted as appropriate  - Keep care items and personal belongings within reach  - Initiate and maintain comfort rounds  - Make Fall Risk Sign visible to staff  - Apply yellow socks and bracelet for high fall risk patients  - Consider moving patient to room near nurses station  Outcome: Progressing     Problem: PAIN - ADULT  Goal: Verbalizes/displays adequate comfort level or baseline comfort level  Description: Interventions:  - Encourage patient to monitor pain and request assistance  - Assess pain using appropriate pain scale  - Administer analgesics based on type and severity of pain and evaluate response  - Implement non-pharmacological measures as appropriate and evaluate response  - Consider cultural and social influences on pain and pain management  - Notify physician/advanced practitioner if interventions unsuccessful or patient reports new pain  Outcome: Progressing     Problem: INFECTION - ADULT  Goal: Absence or prevention of progression during hospitalization  Description: INTERVENTIONS:  - Assess and monitor for signs and symptoms of infection  - Monitor lab/diagnostic results  - Monitor all insertion sites, i e  indwelling lines, tubes, and drains  - Monitor endotracheal if appropriate and nasal secretions for changes in amount and color  - [place] appropriate cooling/warming therapies per order  - Administer medications as ordered  - Instruct and encourage patient and family to use good hand hygiene technique  - Identify and instruct in appropriate isolation precautions for identified infection/condition  Outcome: Progressing  Goal: Absence of fever/infection during neutropenic period  Description: INTERVENTIONS:  - Monitor WBC    Outcome: Progressing     Problem: SAFETY ADULT  Goal: Maintain or return to baseline ADL function  Description: INTERVENTIONS:  -  Assess patient's ability to carry out ADLs; assess patient's baseline for ADL function and identify physical deficits which impact ability to perform ADLs (bathing, care of mouth/teeth, toileting, grooming, dressing, etc )  - Assess/evaluate cause of self-care deficits   - Assess range of motion  - Assess patient's mobility; develop plan if impaired  - Assess patient's need for assistive devices and provide as appropriate  - Encourage maximum independence but intervene and supervise when necessary  - Involve family in performance of ADLs  - Assess for home care needs following discharge   - Consider OT consult to assist with ADL evaluation and planning for discharge  - Provide patient education as appropriate  Outcome: Progressing  Goal: Maintains/Returns to pre admission functional level  Description: INTERVENTIONS:  - Perform BMAT or MOVE assessment daily    - Set and communicate daily mobility goal to care team and patient/family/caregiver     - Collaborate with rehabilitation services on mobility goals if consulted  - Out of bed to chair 3 times a day   - Out of bed for meals 3 times a day  - Out of bed for toileting  - Record patient progress and toleration of activity level   Outcome: Progressing  Goal: Patient will remain free of falls  Description: INTERVENTIONS:  - Educate patient/family on patient safety including physical limitations  - Instruct patient to call for assistance with activity   - Consult OT/PT to assist with strengthening/mobility   - Keep Call bell within reach  - Keep bed low and locked with side rails adjusted as appropriate  - Keep care items and personal belongings within reach  - Initiate and maintain comfort rounds  - Make Fall Risk Sign visible to staff  - Apply yellow socks and bracelet for high fall risk patients  - Consider moving patient to room near nurses station  Outcome: Progressing     Problem: DISCHARGE PLANNING  Goal: Discharge to home or other facility with appropriate resources  Description: INTERVENTIONS:  - Identify barriers to discharge w/patient and caregiver  - Arrange for needed discharge resources and transportation as appropriate  - Identify discharge learning needs (meds, wound care, etc )  - Arrange for interpretive services to assist at discharge as needed  - Refer to Case Management Department for coordinating discharge planning if the patient needs post-hospital services based on physician/advanced practitioner order or complex needs related to functional status, cognitive ability, or social support system  Outcome: Progressing     Problem: Knowledge Deficit  Goal: Patient/family/caregiver demonstrates understanding of disease process, treatment plan, medications, and discharge instructions  Description: Complete learning assessment and assess knowledge base    Interventions:  - Provide teaching at level of understanding  - Provide teaching via preferred learning methods  Outcome: Progressing     Problem: Prexisting or High Potential for Compromised Skin Integrity  Goal: Skin integrity is maintained or improved  Description: INTERVENTIONS:  - Identify patients at risk for skin breakdown  - Assess and monitor skin integrity  - Assess and monitor nutrition and hydration status  - Monitor labs   - Assess for incontinence   - Turn and reposition patient  - Assist with mobility/ambulation  - Relieve pressure over bony prominences  - Avoid friction and shearing  - Provide appropriate hygiene as needed including keeping skin clean and dry  - Evaluate need for skin moisturizer/barrier cream  - Collaborate with interdisciplinary team   - Patient/family teaching  - Consider wound care consult   Outcome: Progressing     Problem: CARDIOVASCULAR - ADULT  Goal: Maintains optimal cardiac output and hemodynamic stability  Description: INTERVENTIONS:  - Monitor I/O, vital signs and rhythm  - Monitor for S/S and trends of decreased cardiac output  - Administer and titrate ordered vasoactive medications to optimize hemodynamic stability  - Assess quality of pulses, skin color and temperature  - Assess for signs of decreased coronary artery perfusion  - Instruct patient to report change in severity of symptoms  Outcome: Progressing     Problem: METABOLIC, FLUID AND ELECTROLYTES - ADULT  Goal: Fluid balance maintained  Description: INTERVENTIONS:  - Monitor labs   - Monitor I/O and WT  - Instruct patient on fluid and nutrition as appropriate  - Assess for signs & symptoms of volume excess or deficit  Outcome: Progressing     Problem: HEMATOLOGIC - ADULT  Goal: Maintains hematologic stability  Description: INTERVENTIONS  - Assess for signs and symptoms of bleeding or hemorrhage  - Monitor labs  - Administer supportive blood products/factors as ordered and appropriate  Outcome: Progressing

## 2022-11-15 NOTE — PLAN OF CARE
Problem: Knowledge Deficit  Goal: Patient/family/caregiver demonstrates understanding of disease process, treatment plan, medications, and discharge instructions  Description: Complete learning assessment and assess knowledge base    Interventions:  - Provide teaching at level of understanding  - Provide teaching via preferred learning methods  Outcome: Progressing     Problem: CARDIOVASCULAR - ADULT  Goal: Maintains optimal cardiac output and hemodynamic stability  Description: INTERVENTIONS:  - Monitor I/O, vital signs and rhythm  - Monitor for S/S and trends of decreased cardiac output  - Administer and titrate ordered vasoactive medications to optimize hemodynamic stability  - Assess quality of pulses, skin color and temperature  - Assess for signs of decreased coronary artery perfusion  - Instruct patient to report change in severity of symptoms  Outcome: Progressing     Problem: HEMATOLOGIC - ADULT  Goal: Maintains hematologic stability  Description: INTERVENTIONS  - Assess for signs and symptoms of bleeding or hemorrhage  - Monitor labs  - Administer supportive blood products/factors as ordered and appropriate  Outcome: Progressing

## 2022-11-16 ENCOUNTER — APPOINTMENT (INPATIENT)
Dept: VASCULAR ULTRASOUND | Facility: HOSPITAL | Age: 82
End: 2022-11-16

## 2022-11-16 VITALS
TEMPERATURE: 98.4 F | HEART RATE: 81 BPM | BODY MASS INDEX: 29.8 KG/M2 | RESPIRATION RATE: 16 BRPM | SYSTOLIC BLOOD PRESSURE: 151 MMHG | OXYGEN SATURATION: 96 % | HEIGHT: 60 IN | DIASTOLIC BLOOD PRESSURE: 89 MMHG | WEIGHT: 151.76 LBS

## 2022-11-16 LAB
ANION GAP SERPL CALCULATED.3IONS-SCNC: 11 MMOL/L (ref 4–13)
BASOPHILS # BLD AUTO: 0.06 THOUSANDS/ÂΜL (ref 0–0.1)
BASOPHILS NFR BLD AUTO: 1 % (ref 0–1)
BUN SERPL-MCNC: 18 MG/DL (ref 5–25)
CALCIUM SERPL-MCNC: 9.3 MG/DL (ref 8.3–10.1)
CHLORIDE SERPL-SCNC: 110 MMOL/L (ref 96–108)
CO2 SERPL-SCNC: 22 MMOL/L (ref 21–32)
CREAT SERPL-MCNC: 1.01 MG/DL (ref 0.6–1.3)
EOSINOPHIL # BLD AUTO: 0.21 THOUSAND/ÂΜL (ref 0–0.61)
EOSINOPHIL NFR BLD AUTO: 4 % (ref 0–6)
ERYTHROCYTE [DISTWIDTH] IN BLOOD BY AUTOMATED COUNT: 14.6 % (ref 11.6–15.1)
GFR SERPL CREATININE-BSD FRML MDRD: 51 ML/MIN/1.73SQ M
GLUCOSE SERPL-MCNC: 95 MG/DL (ref 65–140)
HCT VFR BLD AUTO: 32.9 % (ref 34.8–46.1)
HGB BLD-MCNC: 10.6 G/DL (ref 11.5–15.4)
IMM GRANULOCYTES # BLD AUTO: 0.01 THOUSAND/UL (ref 0–0.2)
IMM GRANULOCYTES NFR BLD AUTO: 0 % (ref 0–2)
INR PPP: 1.4 (ref 0.84–1.19)
INR PPP: 1.43 (ref 0.84–1.19)
LYMPHOCYTES # BLD AUTO: 1.04 THOUSANDS/ÂΜL (ref 0.6–4.47)
LYMPHOCYTES NFR BLD AUTO: 21 % (ref 14–44)
MAGNESIUM SERPL-MCNC: 1.9 MG/DL (ref 1.6–2.6)
MCH RBC QN AUTO: 28.7 PG (ref 26.8–34.3)
MCHC RBC AUTO-ENTMCNC: 32.2 G/DL (ref 31.4–37.4)
MCV RBC AUTO: 89 FL (ref 82–98)
MONOCYTES # BLD AUTO: 0.49 THOUSAND/ÂΜL (ref 0.17–1.22)
MONOCYTES NFR BLD AUTO: 10 % (ref 4–12)
NEUTROPHILS # BLD AUTO: 3.18 THOUSANDS/ÂΜL (ref 1.85–7.62)
NEUTS SEG NFR BLD AUTO: 64 % (ref 43–75)
NRBC BLD AUTO-RTO: 0 /100 WBCS
PLATELET # BLD AUTO: 151 THOUSANDS/UL (ref 149–390)
PMV BLD AUTO: 9.9 FL (ref 8.9–12.7)
POTASSIUM SERPL-SCNC: 4.2 MMOL/L (ref 3.5–5.3)
PROTHROMBIN TIME: 16.9 SECONDS (ref 11.6–14.5)
PROTHROMBIN TIME: 17.1 SECONDS (ref 11.6–14.5)
RBC # BLD AUTO: 3.69 MILLION/UL (ref 3.81–5.12)
SODIUM SERPL-SCNC: 143 MMOL/L (ref 135–147)
WBC # BLD AUTO: 4.99 THOUSAND/UL (ref 4.31–10.16)

## 2022-11-16 RX ORDER — WARFARIN SODIUM 2 MG/1
1 TABLET ORAL
Status: DISCONTINUED | OUTPATIENT
Start: 2022-11-17 | End: 2022-11-16 | Stop reason: HOSPADM

## 2022-11-16 RX ORDER — ASPIRIN 81 MG/1
81 TABLET ORAL DAILY
Qty: 60 TABLET | Refills: 0 | Status: SHIPPED | OUTPATIENT
Start: 2022-11-17 | End: 2023-01-16

## 2022-11-16 RX ORDER — WARFARIN SODIUM 2 MG/1
1 TABLET ORAL
Status: DISCONTINUED | OUTPATIENT
Start: 2022-11-16 | End: 2022-11-16

## 2022-11-16 RX ORDER — AMLODIPINE BESYLATE 5 MG/1
5 TABLET ORAL DAILY
Qty: 60 TABLET | Refills: 0 | Status: SHIPPED | OUTPATIENT
Start: 2022-11-17 | End: 2023-01-16

## 2022-11-16 RX ORDER — WARFARIN SODIUM 2 MG/1
2 TABLET ORAL
Status: DISCONTINUED | OUTPATIENT
Start: 2022-11-16 | End: 2022-11-16

## 2022-11-16 RX ADMIN — ASPIRIN 81 MG: 81 TABLET, COATED ORAL at 09:31

## 2022-11-16 RX ADMIN — AMLODIPINE BESYLATE 5 MG: 5 TABLET ORAL at 09:31

## 2022-11-16 RX ADMIN — METOPROLOL SUCCINATE 25 MG: 25 TABLET, EXTENDED RELEASE ORAL at 09:31

## 2022-11-16 NOTE — DISCHARGE INSTRUCTIONS
After Radial Heart Catheterization   WHAT YOU NEED TO KNOW:   What will happen after a radial heart catheterization? You will be attached to a heart monitor until you are fully awake  A heart monitor is an EKG that stays on continuously to record your heart's electrical activity  Healthcare providers will monitor your vital signs and pulses in your arm  They will frequently check your pressure bandage for bleeding or swelling  You may have a band wrapped tightly around your wrist  The band puts pressure on your wound and helps prevent bleeding  A healthcare provider can put air into the band or remove air from the band  A healthcare provider will gradually remove air from the band and decrease pressure on your wrist  The band may be removed in 2 hours or when your wound stops bleeding  You will need to keep your wrist straight for 2 to 4 hours  Do not  push or pull with your arm  Arm movements can cause serious bleeding  After you are monitored for several hours, you may go home or may need to stay in the hospital overnight  What do I need to know before I go home? Care for your wound as directed  Remove the pressure bandage in 24 hours or as directed  Mild bruising is normal and expected  A small bandage can be placed on your wound after you remove the pressure bandage  Do not put powders, lotions, or creams on your wound  They may cause your wound to get infected  Monitor your wound every day for signs of infection, such as redness, swelling, or pus  Shower the day after your procedure or as directed  Remove your pressure bandage before you shower  Do not take baths or go in hot tubs or pools  Carefully wash the wound with soap and water  Pat the area dry  A small bandage can be placed on your wound after you shower  Apply firm, steady pressure to your wound if it bleeds  Apply pressure with a clean gauze or towel for 5 to 10 minutes  Call 911 if bleeding becomes heavy or does not stop  Drink liquids as directed  Liquids will help flush the contrast liquid from your body  Ask how much liquid to drink each day and which liquids are best for you  Do not lift anything heavier than 5 pounds until directed by your healthcare provider  Heavy lifting can put stress on your wound and cause bleeding  Do not push or pull with the arm that was used for the procedure  Do not do vigorous activity for at least 48 hours  Vigorous activity may cause bleeding from your wound  Rest and do quiet activities  Take short walks around the house to prevent a blood clot  Ask your healthcare provider when you can return to your normal activities  Do not drive or return to work until your healthcare provider says it is okay  Your healthcare provider may tell you to wait 48 hours before you drive to decrease your risk for bleeding  You may not be able to return to work for at least 2 days after your procedure if your job involves heavy lifting  What medicines may I need? You may need any of the following:  Blood thinners  help prevent blood clots  Clots can cause strokes, heart attacks, and death  The following are general safety guidelines to follow while you are taking a blood thinner:    Watch for bleeding and bruising while you take blood thinners  Watch for bleeding from your gums or nose  Watch for blood in your urine and bowel movements  Use a soft washcloth on your skin, and a soft toothbrush to brush your teeth  This can keep your skin and gums from bleeding  If you shave, use an electric shaver  Do not play contact sports  Tell your dentist and other healthcare providers that you take a blood thinner  Wear a bracelet or necklace that says you take this medicine  Do not start or stop any other medicines unless your healthcare provider tells you to  Many medicines cannot be used with blood thinners  Take your blood thinner exactly as prescribed by your healthcare provider   Do not skip does or take less than prescribed  Tell your provider right away if you forget to take your blood thinner, or if you take too much  Warfarin  is a blood thinner that you may need to take  The following are things you should be aware of if you take warfarin:     Foods and medicines can affect the amount of warfarin in your blood  Do not make major changes to your diet while you take warfarin  Warfarin works best when you eat about the same amount of vitamin K every day  Vitamin K is found in green leafy vegetables and certain other foods  Ask for more information about what to eat when you are taking warfarin  You will need to see your healthcare provider for follow-up visits when you are on warfarin  You will need regular blood tests  These tests are used to decide how much medicine you need  Acetaminophen  helps decrease pain and fever  This medicine is available without a doctor's order  Ask how much medicine is safe to take, and how often to take it  Acetaminophen can cause liver damage if not taken correctly  Take your medicine as directed  Contact your healthcare provider if you think your medicine is not helping or if you have side effects  Tell him or her if you are allergic to any medicine  Keep a list of the medicines, vitamins, and herbs you take  Include the amounts, and when and why you take them  Bring the list or the pill bottles to follow-up visits  Carry your medicine list with you in case of an emergency  Call your local emergency number (911 in the [address] [facility]) if:   You have chest pain       You have any of the following signs of a heart attack:      Squeezing, pressure, or pain in your chest    You may  also have any of the following:     Discomfort or pain in your back, neck, jaw, stomach, or arm    Shortness of breath    Nausea or vomiting    Lightheadedness or a sudden cold sweat    You have any of the following signs of a stroke:      Numbness or drooping on one side of your face Weakness in an arm or leg    Confusion or difficulty speaking    Dizziness, a severe headache, or vision loss    You cough up blood  You have trouble breathing  You cannot stop the bleeding from your wound even after you hold firm pressure for 10 minutes  When should I call my doctor? You have a fever or chills  Blood soaks through your bandage  Your stitches come apart  Your hand or arm feels numb, cool, or looks pale  Your wound gets swollen quickly  Your wound is red, swollen, or draining pus  Your wound looks more bruised or you have new bruising on the side of your wrist      You have nausea or are vomiting  Your skin is itchy, swollen, or you have a rash  You have questions or concerns about your condition or care  Healthy living tips: The following are general healthy guidelines  If your chest pain is caused by a heart problem, your healthcare provider will give you specific guidelines to follow  Manage other health conditions  Diabetes and high cholesterol increases your risk for another heart attack and stroke  Talk to your healthcare provider about your management plan  He or she will make a plan that helps you manage your conditions  Do not smoke  Nicotine and other chemicals in cigarettes and cigars can cause lung and heart damage  Ask your healthcare provider for information if you currently smoke and need help to quit  E-cigarettes or smokeless tobacco still contain nicotine  Talk to your healthcare provider before you use these products  Eat a variety of healthy, low-fat, low-salt foods  Healthy foods include fruits, vegetables, whole-grain breads, low-fat dairy products, beans, lean meats, and fish  Ask for more information about a heart healthy diet  Drink plenty of water every day  Your body is made of mostly water  Water helps your body to control your temperature and blood pressure   Ask your healthcare provider how much water you should drink every day  Ask about activity  Your healthcare provider will tell you which activities to limit or avoid  Ask when you can drive, return to work, and have sex  Ask about the best exercise plan for you  Maintain a healthy weight  Ask your healthcare provider how much you should weigh  Ask him or her to help you create a weight loss plan if you are overweight  Get the flu and pneumonia vaccines  All adults should get the influenza (flu) vaccine  Get it every year as soon as it becomes available  The pneumococcal vaccine is given to adults aged 72 years or older  The vaccine is given every 5 years to prevent pneumococcal disease, such as pneumonia  If you have a stent:   Carry your stent card with you at all times  Let all healthcare providers know that you have a stent  CARE AGREEMENT:   You have the right to help plan your care  Learn about your health condition and how it may be treated  Discuss treatment options with your healthcare providers to decide what care you want to receive  You always have the right to refuse treatment  The above information is an  only  It is not intended as medical advice for individual conditions or treatments  Talk to your doctor, nurse or pharmacist before following any medical regimen to see if it is safe and effective for you  © Copyright [employer] 2022 Information is for End User's use only and may not be sold, redistributed or otherwise used for commercial purposes   All illustrations and images included in CareNotes® are the copyrighted property of A D A M , Inc  or [address] [place] [address] [employer]

## 2022-11-16 NOTE — DISCHARGE SUMMARY
[address] [facility]  Discharge- Divine Baez 1940, 80 y o  female MRN: [id number]  Unit/Bed#: -01 Encounter: [id number]  Primary Care Provider: No primary care provider on file  Date and time admitted to hospital: 11/7/2022  2:54 PM    * Elevated troponin  Assessment & Plan  · Concerning for angina  · Cardiology on board, appreciate recommendations  · Status post cardiac cath with no significant obstruction   · Status post IV hydration  · Now stable for discharge     H/O aortic valve replacement  Assessment & Plan  · Continue home coumadin on discharge on 11/17    CKD (chronic kidney disease)  Assessment & Plan  Lab Results   Component Value Date    EGFR 51 11/16/2022    EGFR 53 11/15/2022    EGFR 45 11/14/2022    CREATININE 1 01 11/16/2022    CREATININE 0 99 11/15/2022    CREATININE 1 12 11/14/2022     · Cr at baseline, continue to monitor  · Nephrology on board for renal optimization   · Status post optimization  · Now stable for discharge     Chronic diastolic heart failure ([facility] [address] )  Assessment & Plan  Wt Readings from Last 3 Encounters:   11/16/22 68 8 kg (151 lb 12 2 oz)     Last echo 1/22: EF>70%,   · Echo results under weakness  · Monitor I/Os, daily weights  · Monitor electrolytes  · Continue metoprolol  · Resume home meds on discharge     Hypertension  Assessment & Plan  · Adequately controlled on current regimen  · Monitor as an outpatient     Atrial fibrillation ([facility] [address] )  Assessment & Plan  · Continue rate control meds  · Coumadin being withheld due to cardiac catheterization, can resume on 11/17  · INR below therapeutic range, instructed to follow up repeat INR as an outpatient   · Stable for discharge         Medical Problems     Resolved Problems  Date Reviewed: 11/14/2022   None       Discharging Physician / Practitioner: Yvonne Painter  PCP: No primary care provider on file    Admission Date:   Admission Orders (From admission, onward)     Ordered        11/08/22 6612 Inpatient Admission  Once            11/07/22 1721  Place in Observation  Once                      Discharge Date: 11/16/22    Consultations During Hospital Stay:  [address]  · IP CONSULT TO NEPHROLOGY  ·     Procedures Performed:   · Imaging, cardiac cath     Significant Findings / Test Results:   Principal Problem:    Elevated troponin  Active Problems:    Atrial fibrillation (HCC)    Hypertension    Weakness    Chronic diastolic heart failure (HCC)    CKD (chronic kidney disease)    H/O aortic valve replacement  Resolved Problems:    * No resolved hospital problems  *  ·   ·     Incidental Findings:   · none     Test Results Pending at Discharge (will require follow up):   · none     Outpatient Tests Requested:  · none    Complications:  none    Reason for Admission: chest pain    Hospital Course:   Cynthia Zhu is a 80 y o  female patient who originally presented to the hospital on 11/7/2022 due to chest pain status post cardiac cath without significant obstruction now stable for discharge  Condition at Discharge: good    Discharge Day Visit / Exam:   * Please refer to separate progress note for these details *    Discussion with Family: Updated  () at bedside  Discharge instructions/Information to patient and family:   See after visit summary for information provided to patient and family  Provisions for Follow-Up Care:  See after visit summary for information related to follow-up care and any pertinent home health orders  Disposition:   Home    Planned Readmission: none      Discharge Statement:  I spent 30+ minutes discharging the patient  This time was spent on the day of discharge  I had direct contact with the patient on the day of discharge  Greater than 50% of the total time was spent examining patient, answering all patient questions, arranging and discussing plan of care with patient as well as directly providing post-discharge instructions  Additional time then spent on discharge activities  Discharge Medications:  See after visit summary for reconciled discharge medications provided to patient and/or family        **Please Note: This note may have been constructed using a voice recognition system**

## 2022-11-16 NOTE — ASSESSMENT & PLAN NOTE
· Continue rate control meds  · Coumadin being withheld due to cardiac catheterization, can resume on 11/17  · INR below therapeutic range, instructed to follow up repeat INR as an outpatient   · Stable for discharge

## 2022-11-16 NOTE — PROGRESS NOTES
NEPHROLOGY PROGRESS NOTE    Patient: Terri Bowenllor               Sex: female          DOA: 11/7/2022  2:54 PM   YOB: 1940        Age:  80 y o         LOS:  LOS: 8 days       HPI     Patient with coronary disease and CKD    SUBJECTIVE     Status post cardiac catheterization which was essentially normal    Patient denies any complaint    No chest pain no palpitation or shortness of breath    No nausea no vomiting    CURRENT MEDICATIONS       Current Facility-Administered Medications:   •  amLODIPine (NORVASC) tablet 5 mg, 5 mg, Oral, Daily, Jennifer Reinoso MD, 5 mg at 11/16/22 3253  •  aspirin (ECOTRIN LOW STRENGTH) EC tablet 81 mg, 81 mg, Oral, Daily, Nehemiah Naik MD, 81 mg at 11/16/22 7734  •  hydrALAZINE (APRESOLINE) injection 5 mg, 5 mg, Intravenous, Q6H PRN, Lorri Rouse MD  •  metoprolol succinate (TOPROL-XL) 24 hr tablet 25 mg, 25 mg, Oral, Daily, Marion Benson PA-C, 25 mg at 11/16/22 0931  •  [START ON 11/17/2022] warfarin (COUMADIN) tablet 1 mg, 1 mg, Oral, Daily (warfarin), Diomedes Garibay PA-C    OBJECTIVE     Current Weight: Weight - Scale: 68 8 kg (151 lb 12 2 oz)  Vitals:    11/16/22 0651   BP: 151/89   Pulse: 81   Resp: 16   Temp: 98 4 °F (36 9 °C)   SpO2: 96%       Intake/Output Summary (Last 24 hours) at 11/16/2022 1130  Last data filed at 11/16/2022 0501  Gross per 24 hour   Intake 560 ml   Output 455 ml   Net 105 ml       PHYSICAL EXAMINATION     Physical Exam  Constitutional:       General: She is not in acute distress  Appearance: She is well-developed  HENT:      Head: Normocephalic  Mouth/Throat:      Mouth: Oropharynx is clear and moist    Eyes:      General: No scleral icterus  Conjunctiva/sclera: Conjunctivae normal    Neck:      Vascular: No JVD  Cardiovascular:      Rate and Rhythm: Normal rate  Heart sounds: Normal heart sounds  Pulmonary:      Effort: Pulmonary effort is normal       Breath sounds: No wheezing     Abdominal:      Palpations: Abdomen is soft  Tenderness: There is no abdominal tenderness  Musculoskeletal:         General: No edema  Normal range of motion  Cervical back: Neck supple  Skin:     General: Skin is warm  Findings: No rash  Neurological:      Mental Status: She is alert and oriented to person, place, and time  Psychiatric:         Mood and Affect: Mood and affect normal          Behavior: Behavior normal           LAB RESULTS     Results from last 7 days   Lab Units 11/16/22  0537 11/15/22  0528 11/14/22  0540 11/13/22  0618 11/12/22  1042 11/12/22  0358 11/11/22  0513 11/10/22  0609   WBC Thousand/uL 4 99  --   --  5 72 5 86 5 54 4 99 5 49   HEMOGLOBIN g/dL 10 6*  --   --  12 2 12 8 11 8 11 9 12 2   HEMATOCRIT % 32 9*  --   --  36 5 39 4 35 8 35 2 37 4   PLATELETS Thousands/uL 151  --   --  162 183 154 149 172   POTASSIUM mmol/L 4 2 3 8 4 0 4 1 4 0 4 0 4 2 4 1   CHLORIDE mmol/L 110* 110* 109* 108 105 108 105 105   CO2 mmol/L 22 21 23 24 25 28 28 32   BUN mg/dL 18 13 13 12 16 18 22 21   CREATININE mg/dL 1 01 0 99 1 12 1 17 1 13 1 13 1 25 1 36*   EGFR ml/min/1 73sq m 51 53 45 43 45 45 40 36   CALCIUM mg/dL 9 3 8 4 9 0 9 3 9 3 9 1 9 0 8 9   MAGNESIUM mg/dL 1 9  --   --   --   --   --   --   --        RADIOLOGY RESULTS      No results found for this or any previous visit  Results for orders placed during the hospital encounter of 11/07/22    XR chest 2 views    Narrative  CHEST    INDICATION:   dizziness  Weakness    COMPARISON:  None    EXAM PERFORMED/VIEWS:  XR CHEST PA & LATERAL  Images: 2    FINDINGS:    Cardiomediastinal silhouette appears enlarged  A median sternotomy has been performed  Prosthetic aortic valve  The lungs are clear  No pneumothorax or pleural effusion  Osseous structures appear within normal limits for patient age  Impression  No acute cardiopulmonary disease  Workstation performed: [id number]    No results found for this or any previous visit      No results found for this or any previous visit  No results found for this or any previous visit  No results found for this or any previous visit  PLAN / RECOMMENDATIONS      Coronary artery disease:  Status post cardiac catheterization which was normal    CKD stage 3:  Remain stable after cardiac catheterization  IV fluid has been  Hypertension:  Reasonably well controlled and will monitor    Disposition:  Can be discharged renal point of view    Mukesh Johnson MD  Nephrology  11/16/2022        Portions of the record may have been created with voice recognition software  Occasional wrong word or "sound a like" substitutions may have occurred due to the inherent limitations of voice recognition software  Read the chart carefully and recognize, using context, where substitutions have occurred

## 2022-11-16 NOTE — ASSESSMENT & PLAN NOTE
Wt Readings from Last 3 Encounters:   11/16/22 68 8 kg (151 lb 12 2 oz)     Last echo 1/22: EF>70%,   · Echo results under weakness  · Monitor I/Os, daily weights  · Monitor electrolytes  · Continue metoprolol  · Resume home meds on discharge

## 2022-11-16 NOTE — PROGRESS NOTES
Cardiology Progress Note - [facility] 80 y o  female MRN: [id number]    Unit/Bed#: -01 Encounter: [id number]      Assessment/Plan:  1  Elevated troponin  • Troponin with peak of 425  • Patient complained of weakness, dizziness and nausea  • Echo reveals EF 55% with mod biatrial dilation; mod TR, mod pulmonary HTN, and bioprosthetic aortic valve  • Cardiac catheterization revealed no significant CAD  • Patient developed bruising around the radial access site; femoral access site healing well  • Apply heat to right upper extremity  • VAS pseudoaneurysm study of upper arm pending     2  Chronic atrial fibrillation   • Rate currently averaging 80 bpm  • Continue Toprol-XL 25 mg  • Restart warfarin tomorrow     3  Hypertension  • Currently 151/89; continue to monitor  • Continue amlodipine 5 mg and Toprol-XL 25 mg     4  Aortic stenosis s/p TAVR (valve in valve)  • Noted on echo; no evidence of regurgitation or significant stenosis     5  Chronic kidney disease stage 3  • CR  1 01; continue to monitor  • Management per nephrology and Internal Medicine    6  Chronic heart failure preserved ejection fraction  · Euvolemic on exam; does not appear to be in acute exacerbation  · Echo reveals EF 55%  · Continue Toprol-XL 25 mg  · Strict I/O and daily weights; continue sodium restriction    7  Pulmonary hypertension  · ECHO revealed moderate pulmonary hypertension  · Continue to monitor as outpatient with serial echocardiograms      Subjective:   Patient seen and examined  No significant events overnight  Patient noted bruising around right radial access site s/p catheterization  Denies numbness, tingling or weakness  Otherwise denies new complaints at this time  Objective:     Vitals: Blood pressure 151/89, pulse 81, temperature 98 4 °F (36 9 °C), temperature source Oral, resp  rate 16, height 5' (1 524 m), weight 68 8 kg (151 lb 12 2 oz), SpO2 96 %  , Body mass index is 29 64 kg/m² ,   Orthostatic Blood Pressures    Flowsheet Row Most Recent Value   Blood Pressure 151/89 filed at 11/16/2022 4284   Patient Position - Orthostatic VS Sitting filed at 11/13/2022 1500            Intake/Output Summary (Last 24 hours) at 11/16/2022 1010  Last data filed at 11/16/2022 0501  Gross per 24 hour   Intake 560 ml   Output 455 ml   Net 105 ml         Physical Exam:  Physical Exam  Vitals and nursing note reviewed  Constitutional:       General: She is not in acute distress  Appearance: She is well-developed  She is not diaphoretic  HENT:      Head: Normocephalic and atraumatic  Nose: Nose normal    Eyes:      Conjunctiva/sclera: Conjunctivae normal    Cardiovascular:      Rate and Rhythm: Normal rate  Rhythm irregularly irregular  Heart sounds: Normal heart sounds  No murmur heard  Pulmonary:      Effort: Pulmonary effort is normal  No respiratory distress  Breath sounds: Normal breath sounds  No wheezing or rales  Chest:      Chest wall: No tenderness  Abdominal:      Palpations: Abdomen is soft  Tenderness: There is no abdominal tenderness  Musculoskeletal:         General: No swelling  Cervical back: Neck supple  Right lower leg: No edema  Left lower leg: No edema  Skin:     General: Skin is warm and dry  Capillary Refill: Capillary refill takes less than 2 seconds  Findings: Bruising (Right arm) present  Neurological:      Mental Status: She is alert and oriented to person, place, and time     Psychiatric:         Mood and Affect: Mood normal          Judgment: Judgment normal               Medications:      Current Facility-Administered Medications:   •  amLODIPine (NORVASC) tablet 5 mg, 5 mg, Oral, Daily, Cristiana Ames MD, 5 mg at 11/16/22 0931  •  aspirin (ECOTRIN LOW STRENGTH) EC tablet 81 mg, 81 mg, Oral, Daily, Cory Orozco MD, 81 mg at 11/16/22 0931  •  hydrALAZINE (APRESOLINE) injection 5 mg, 5 mg, Intravenous, Q6H PRN, Maritza Shelton MD  •  metoprolol succinate (TOPROL-XL) 24 hr tablet 25 mg, 25 mg, Oral, Daily, Anna Gomez PA-C, 25 mg at 11/16/22 7846  •  warfarin (COUMADIN) tablet 1 mg, 1 mg, Oral, Daily (warfarin), Miky Guadalupe PA-C     Labs & Results:         Results from last 7 days   Lab Units 11/16/22  0537 11/13/22  0618 11/12/22  1042   WBC Thousand/uL 4 99 5 72 5 86   HEMOGLOBIN g/dL 10 6* 12 2 12 8   HEMATOCRIT % 32 9* 36 5 39 4   PLATELETS Thousands/uL 151 162 183         Results from last 7 days   Lab Units 11/16/22  0537 11/15/22  0528 11/14/22  0540   POTASSIUM mmol/L 4 2 3 8 4 0   CHLORIDE mmol/L 110* 110* 109*   CO2 mmol/L 22 21 23   BUN mg/dL 18 13 13   CREATININE mg/dL 1 01 0 99 1 12   CALCIUM mg/dL 9 3 8 4 9 0     Results from last 7 days   Lab Units 11/16/22  0537 11/15/22  0528 11/14/22  2130 11/14/22  1307 11/14/22  0540   INR  1 43*  1 40* 1 57*  --   --  1 85*   PTT seconds  --  63* 60* 88* 53*     Results from last 7 days   Lab Units 11/16/22  0537   MAGNESIUM mg/dL 1 9       Vitals: Blood pressure 151/89, pulse 81, temperature 98 4 °F (36 9 °C), temperature source Oral, resp  rate 16, height 5' (1 524 m), weight 68 8 kg (151 lb 12 2 oz), SpO2 96 %  , Body mass index is 29 64 kg/m² ,   Orthostatic Blood Pressures    Flowsheet Row Most Recent Value   Blood Pressure 151/89 filed at 11/16/2022 3901   Patient Position - Orthostatic VS Sitting filed at 11/13/2022 3223          Systolic (67MVH), FWC:387 , Min:121 , WRV:016     Diastolic (64AAC), ZWQ:91, Min:82, Max:99        Intake/Output Summary (Last 24 hours) at 11/16/2022 1010  Last data filed at 11/16/2022 0501  Gross per 24 hour   Intake 560 ml   Output 455 ml   Net 105 ml       Invasive Devices     Peripheral Intravenous Line  Duration           Peripheral IV 11/11/22 Left;Ventral (anterior) Wrist 4 days                  EKG:  Atrial fibrillation with nonspecific ST and T-wave abnormality      BP Readings from Last 3 Encounters:   11/16/22 151/89      Wt Readings from Last 3 Encounters:   11/16/22 68 8 kg (151 lb 12 2 oz)

## 2022-11-16 NOTE — CASE MANAGEMENT
Case Management Discharge Planning Note    Patient name Carmenza Miller  Location [address] [facility] [address] [id number]/-[id number] MRN [id number]  : 1940 Date 2022       Current Admission Date: 2022  Current Admission Diagnosis:Elevated troponin   Patient Active Problem List    Diagnosis Date Noted   • H/O aortic valve replacement 2022   • Atrial fibrillation ([facility] ) 2022   • Hypertension 2022   • Weakness 2022   • Chronic diastolic heart failure ([facility] [address] ) 2022   • Elevated troponin 2022   • CKD (chronic kidney disease) 2022      LOS (days): 8  Geometric Mean LOS (GMLOS) (days): 2 50  Days to GMLOS:-5 4     OBJECTIVE:  Risk of Unplanned Readmission Score: 11 58      Current admission status: Inpatient   Preferred Pharmacy:   [facility] [address]  [facility] [address] [place] [address]  [address]  Phone: [phone] Fax: [phone]    Primary Care Provider: No primary care provider on file  Primary Insurance: MEDICARE  Secondary Insurance: [address] [place] [address]    DISCHARGE DETAILS:    IMM Given (Date):: 22  IMM Given to[de-identified] Patient     Additional Comments: Per the medical team, the patient is medically ready for discharge today with self care and family support  The CM explained and provided a copy of the IMM  The patient verbalized understanding  The the patients  will provide transportation home at discharge

## 2022-11-16 NOTE — PLAN OF CARE
Problem: MOBILITY - ADULT  Goal: Maintain or return to baseline ADL function  Description: INTERVENTIONS:  -  Assess patient's ability to carry out ADLs; assess patient's baseline for ADL function and identify physical deficits which impact ability to perform ADLs (bathing, care of mouth/teeth, toileting, grooming, dressing, etc )  - Assess/evaluate cause of self-care deficits   - Assess range of motion  - Assess patient's mobility; develop plan if impaired  - Assess patient's need for assistive devices and provide as appropriate  - Encourage maximum independence but intervene and supervise when necessary  - Involve family in performance of ADLs  - Assess for home care needs following discharge   - Consider OT consult to assist with ADL evaluation and planning for discharge  - Provide patient education as appropriate  Outcome: Progressing  Goal: Maintains/Returns to pre admission functional level  Description: INTERVENTIONS:  - Perform BMAT or MOVE assessment daily    - Set and communicate daily mobility goal to care team and patient/family/caregiver  - Collaborate with rehabilitation services on mobility goals if consulted  - Perform Range of Motion 3 times a day  - Reposition patient every 3 hours    - Dangle patient 3 times a day  - Stand patient 3 times a day  - Ambulate patient 3 times a day  - Out of bed to chair 3 times a day   - Out of bed for meals 3 times a day  - Out of bed for toileting  - Record patient progress and toleration of activity level   Outcome: Progressing     Problem: Potential for Falls  Goal: Patient will remain free of falls  Description: INTERVENTIONS:  - Educate patient/family on patient safety including physical limitations  - Instruct patient to call for assistance with activity   - Consult OT/PT to assist with strengthening/mobility   - Keep Call bell within reach  - Keep bed low and locked with side rails adjusted as appropriate  - Keep care items and personal belongings within reach  - Initiate and maintain comfort rounds  - Make Fall Risk Sign visible to staff  - Offer Toileting every 2 Hours, in advance of need  - Initiate/Maintain bed alarm  - Obtain necessary fall risk management equipment  - Apply yellow socks and bracelet for high fall risk patients  - Consider moving patient to room near nurses station  Outcome: Progressing     Problem: PAIN - ADULT  Goal: Verbalizes/displays adequate comfort level or baseline comfort level  Description: Interventions:  - Encourage patient to monitor pain and request assistance  - Assess pain using appropriate pain scale  - Administer analgesics based on type and severity of pain and evaluate response  - Implement non-pharmacological measures as appropriate and evaluate response  - Consider cultural and social influences on pain and pain management  - Notify physician/advanced practitioner if interventions unsuccessful or patient reports new pain  Outcome: Progressing     Problem: INFECTION - ADULT  Goal: Absence or prevention of progression during hospitalization  Description: INTERVENTIONS:  - Assess and monitor for signs and symptoms of infection  - Monitor lab/diagnostic results  - Monitor all insertion sites, i e  indwelling lines, tubes, and drains  - Monitor endotracheal if appropriate and nasal secretions for changes in amount and color  - [place] appropriate cooling/warming therapies per order  - Administer medications as ordered  - Instruct and encourage patient and family to use good hand hygiene technique  - Identify and instruct in appropriate isolation precautions for identified infection/condition  Outcome: Progressing  Goal: Absence of fever/infection during neutropenic period  Description: INTERVENTIONS:  - Monitor WBC    Outcome: Progressing     Problem: SAFETY ADULT  Goal: Maintain or return to baseline ADL function  Description: INTERVENTIONS:  -  Assess patient's ability to carry out ADLs; assess patient's baseline for ADL function and identify physical deficits which impact ability to perform ADLs (bathing, care of mouth/teeth, toileting, grooming, dressing, etc )  - Assess/evaluate cause of self-care deficits   - Assess range of motion  - Assess patient's mobility; develop plan if impaired  - Assess patient's need for assistive devices and provide as appropriate  - Encourage maximum independence but intervene and supervise when necessary  - Involve family in performance of ADLs  - Assess for home care needs following discharge   - Consider OT consult to assist with ADL evaluation and planning for discharge  - Provide patient education as appropriate  Outcome: Progressing  Goal: Maintains/Returns to pre admission functional level  Description: INTERVENTIONS:  - Perform BMAT or MOVE assessment daily    - Set and communicate daily mobility goal to care team and patient/family/caregiver  - Collaborate with rehabilitation services on mobility goals if consulted  - Perform Range of Motion 3 times a day  - Reposition patient every 3 hours    - Dangle patient 3 times a day  - Stand patient 3 times a day  - Ambulate patient 3 times a day  - Out of bed to chair 3 times a day   - Out of bed for meals 3 times a day  - Out of bed for toileting  - Record patient progress and toleration of activity level   Outcome: Progressing  Goal: Patient will remain free of falls  Description: INTERVENTIONS:  - Educate patient/family on patient safety including physical limitations  - Instruct patient to call for assistance with activity   - Consult OT/PT to assist with strengthening/mobility   - Keep Call bell within reach  - Keep bed low and locked with side rails adjusted as appropriate  - Keep care items and personal belongings within reach  - Initiate and maintain comfort rounds  - Make Fall Risk Sign visible to staff  - Offer Toileting every 2 Hours, in advance of need  - Initiate/Maintain bed alarm  - Obtain necessary fall risk management equipment  - Apply yellow socks and bracelet for high fall risk patients  - Consider moving patient to room near nurses station  Outcome: Progressing     Problem: DISCHARGE PLANNING  Goal: Discharge to home or other facility with appropriate resources  Description: INTERVENTIONS:  - Identify barriers to discharge w/patient and caregiver  - Arrange for needed discharge resources and transportation as appropriate  - Identify discharge learning needs (meds, wound care, etc )  - Arrange for interpretive services to assist at discharge as needed  - Refer to Case Management Department for coordinating discharge planning if the patient needs post-hospital services based on physician/advanced practitioner order or complex needs related to functional status, cognitive ability, or social support system  Outcome: Progressing     Problem: Knowledge Deficit  Goal: Patient/family/caregiver demonstrates understanding of disease process, treatment plan, medications, and discharge instructions  Description: Complete learning assessment and assess knowledge base    Interventions:  - Provide teaching at level of understanding  - Provide teaching via preferred learning methods  Outcome: Progressing     Problem: Prexisting or High Potential for Compromised Skin Integrity  Goal: Skin integrity is maintained or improved  Description: INTERVENTIONS:  - Identify patients at risk for skin breakdown  - Assess and monitor skin integrity  - Assess and monitor nutrition and hydration status  - Monitor labs   - Assess for incontinence   - Turn and reposition patient  - Assist with mobility/ambulation  - Relieve pressure over bony prominences  - Avoid friction and shearing  - Provide appropriate hygiene as needed including keeping skin clean and dry  - Evaluate need for skin moisturizer/barrier cream  - Collaborate with interdisciplinary team   - Patient/family teaching  - Consider wound care consult   Outcome: Progressing     Problem: CARDIOVASCULAR - ADULT  Goal: Maintains optimal cardiac output and hemodynamic stability  Description: INTERVENTIONS:  - Monitor I/O, vital signs and rhythm  - Monitor for S/S and trends of decreased cardiac output  - Administer and titrate ordered vasoactive medications to optimize hemodynamic stability  - Assess quality of pulses, skin color and temperature  - Assess for signs of decreased coronary artery perfusion  - Instruct patient to report change in severity of symptoms  Outcome: Progressing     Problem: METABOLIC, FLUID AND ELECTROLYTES - ADULT  Goal: Fluid balance maintained  Description: INTERVENTIONS:  - Monitor labs   - Monitor I/O and WT  - Instruct patient on fluid and nutrition as appropriate  - Assess for signs & symptoms of volume excess or deficit  Outcome: Progressing     Problem: HEMATOLOGIC - ADULT  Goal: Maintains hematologic stability  Description: INTERVENTIONS  - Assess for signs and symptoms of bleeding or hemorrhage  - Monitor labs  - Administer supportive blood products/factors as ordered and appropriate  Outcome: Progressing

## 2022-11-16 NOTE — ASSESSMENT & PLAN NOTE
· Concerning for angina  · Cardiology on board, appreciate recommendations  · Status post cardiac cath with no significant obstruction   · Status post IV hydration  · Now stable for discharge

## 2022-11-16 NOTE — ASSESSMENT & PLAN NOTE
Lab Results   Component Value Date    EGFR 51 11/16/2022    EGFR 53 11/15/2022    EGFR 45 11/14/2022    CREATININE 1 01 11/16/2022    CREATININE 0 99 11/15/2022    CREATININE 1 12 11/14/2022     · Cr at baseline, continue to monitor  · Nephrology on board for renal optimization   · Status post optimization  · Now stable for discharge

## 2022-11-16 NOTE — NURSING NOTE
Upon removing TR band, bruising was noted on R wrist  Patient has good cap refill and +2 pulse  Patient denies pain/numbness  Bruising looks stable, no signs of increasing  Cardiology attending made aware, "looks ok"  Night shift nurse made aware

## 2023-10-06 ENCOUNTER — APPOINTMENT (EMERGENCY)
Dept: RADIOLOGY | Facility: HOSPITAL | Age: 83
End: 2023-10-06
Payer: MEDICARE

## 2023-10-06 ENCOUNTER — APPOINTMENT (EMERGENCY)
Dept: CT IMAGING | Facility: HOSPITAL | Age: 83
End: 2023-10-06
Payer: MEDICARE

## 2023-10-06 ENCOUNTER — HOSPITAL ENCOUNTER (INPATIENT)
Facility: HOSPITAL | Age: 83
LOS: 1 days | Discharge: HOME | End: 2023-10-08
Attending: EMERGENCY MEDICINE | Admitting: STUDENT IN AN ORGANIZED HEALTH CARE EDUCATION/TRAINING PROGRAM
Payer: MEDICARE

## 2023-10-06 DIAGNOSIS — R79.89 ELEVATED TROPONIN: ICD-10-CM

## 2023-10-06 DIAGNOSIS — I50.32 CHRONIC DIASTOLIC HEART FAILURE (HCC): ICD-10-CM

## 2023-10-06 DIAGNOSIS — R42 DIZZINESS: ICD-10-CM

## 2023-10-06 DIAGNOSIS — N18.9 CHRONIC KIDNEY DISEASE, UNSPECIFIED CKD STAGE: ICD-10-CM

## 2023-10-06 DIAGNOSIS — I63.9 CVA (CEREBRAL VASCULAR ACCIDENT) (HCC): Primary | ICD-10-CM

## 2023-10-06 DIAGNOSIS — I48.91 ATRIAL FIBRILLATION, UNSPECIFIED TYPE (HCC): ICD-10-CM

## 2023-10-06 PROBLEM — R55 SYNCOPE: Status: ACTIVE | Noted: 2023-10-06

## 2023-10-06 LAB
2HR DELTA HS TROPONIN: -7 NG/L
4HR DELTA HS TROPONIN: -9 NG/L
ALBUMIN SERPL BCP-MCNC: 4.3 G/DL (ref 3.5–5)
ALP SERPL-CCNC: 55 U/L (ref 34–104)
ALT SERPL W P-5'-P-CCNC: 16 U/L (ref 7–52)
ANION GAP SERPL CALCULATED.3IONS-SCNC: 6 MMOL/L
AST SERPL W P-5'-P-CCNC: 32 U/L (ref 13–39)
ATRIAL RATE: 312 BPM
BASOPHILS # BLD AUTO: 0.05 THOUSANDS/ÂΜL (ref 0–0.1)
BASOPHILS NFR BLD AUTO: 1 % (ref 0–1)
BILIRUB SERPL-MCNC: 0.96 MG/DL (ref 0.2–1)
BNP SERPL-MCNC: 299 PG/ML (ref 0–100)
BUN SERPL-MCNC: 23 MG/DL (ref 5–25)
CALCIUM SERPL-MCNC: 9.5 MG/DL (ref 8.4–10.2)
CARDIAC TROPONIN I PNL SERPL HS: 55 NG/L
CARDIAC TROPONIN I PNL SERPL HS: 57 NG/L
CARDIAC TROPONIN I PNL SERPL HS: 64 NG/L
CHLORIDE SERPL-SCNC: 102 MMOL/L (ref 96–108)
CO2 SERPL-SCNC: 31 MMOL/L (ref 21–32)
CREAT SERPL-MCNC: 1.3 MG/DL (ref 0.6–1.3)
EOSINOPHIL # BLD AUTO: 0.13 THOUSAND/ÂΜL (ref 0–0.61)
EOSINOPHIL NFR BLD AUTO: 2 % (ref 0–6)
ERYTHROCYTE [DISTWIDTH] IN BLOOD BY AUTOMATED COUNT: 14.1 % (ref 11.6–15.1)
GFR SERPL CREATININE-BSD FRML MDRD: 38 ML/MIN/1.73SQ M
GLUCOSE SERPL-MCNC: 111 MG/DL (ref 65–140)
HCT VFR BLD AUTO: 40.4 % (ref 34.8–46.1)
HGB BLD-MCNC: 13 G/DL (ref 11.5–15.4)
IMM GRANULOCYTES # BLD AUTO: 0.04 THOUSAND/UL (ref 0–0.2)
IMM GRANULOCYTES NFR BLD AUTO: 1 % (ref 0–2)
INR PPP: 2.32 (ref 0.84–1.19)
LIPASE SERPL-CCNC: 45 U/L (ref 11–82)
LYMPHOCYTES # BLD AUTO: 0.96 THOUSANDS/ÂΜL (ref 0.6–4.47)
LYMPHOCYTES NFR BLD AUTO: 13 % (ref 14–44)
MCH RBC QN AUTO: 28.1 PG (ref 26.8–34.3)
MCHC RBC AUTO-ENTMCNC: 32.2 G/DL (ref 31.4–37.4)
MCV RBC AUTO: 87 FL (ref 82–98)
MONOCYTES # BLD AUTO: 0.49 THOUSAND/ÂΜL (ref 0.17–1.22)
MONOCYTES NFR BLD AUTO: 7 % (ref 4–12)
NEUTROPHILS # BLD AUTO: 5.85 THOUSANDS/ÂΜL (ref 1.85–7.62)
NEUTS SEG NFR BLD AUTO: 76 % (ref 43–75)
NRBC BLD AUTO-RTO: 0 /100 WBCS
PLATELET # BLD AUTO: 161 THOUSANDS/UL (ref 149–390)
PMV BLD AUTO: 10 FL (ref 8.9–12.7)
POTASSIUM SERPL-SCNC: 4 MMOL/L (ref 3.5–5.3)
PROT SERPL-MCNC: 7.2 G/DL (ref 6.4–8.4)
PROTHROMBIN TIME: 26.3 SECONDS (ref 11.6–14.5)
QRS AXIS: 69 DEGREES
QRSD INTERVAL: 94 MS
QT INTERVAL: 410 MS
QTC INTERVAL: 479 MS
RBC # BLD AUTO: 4.62 MILLION/UL (ref 3.81–5.12)
SODIUM SERPL-SCNC: 139 MMOL/L (ref 135–147)
T WAVE AXIS: 45 DEGREES
VENTRICULAR RATE: 82 BPM
WBC # BLD AUTO: 7.52 THOUSAND/UL (ref 4.31–10.16)

## 2023-10-06 PROCEDURE — 93010 ELECTROCARDIOGRAM REPORT: CPT | Performed by: INTERNAL MEDICINE

## 2023-10-06 PROCEDURE — 99285 EMERGENCY DEPT VISIT HI MDM: CPT | Performed by: EMERGENCY MEDICINE

## 2023-10-06 PROCEDURE — 36415 COLL VENOUS BLD VENIPUNCTURE: CPT | Performed by: EMERGENCY MEDICINE

## 2023-10-06 PROCEDURE — 74174 CTA ABD&PLVS W/CONTRAST: CPT

## 2023-10-06 PROCEDURE — 71045 X-RAY EXAM CHEST 1 VIEW: CPT

## 2023-10-06 PROCEDURE — 85025 COMPLETE CBC W/AUTO DIFF WBC: CPT | Performed by: EMERGENCY MEDICINE

## 2023-10-06 PROCEDURE — 99223 1ST HOSP IP/OBS HIGH 75: CPT | Performed by: STUDENT IN AN ORGANIZED HEALTH CARE EDUCATION/TRAINING PROGRAM

## 2023-10-06 PROCEDURE — 93005 ELECTROCARDIOGRAM TRACING: CPT

## 2023-10-06 PROCEDURE — G1004 CDSM NDSC: HCPCS

## 2023-10-06 PROCEDURE — 84484 ASSAY OF TROPONIN QUANT: CPT | Performed by: EMERGENCY MEDICINE

## 2023-10-06 PROCEDURE — 80053 COMPREHEN METABOLIC PANEL: CPT | Performed by: EMERGENCY MEDICINE

## 2023-10-06 PROCEDURE — 99284 EMERGENCY DEPT VISIT MOD MDM: CPT

## 2023-10-06 PROCEDURE — 71275 CT ANGIOGRAPHY CHEST: CPT

## 2023-10-06 PROCEDURE — 70496 CT ANGIOGRAPHY HEAD: CPT

## 2023-10-06 PROCEDURE — 83690 ASSAY OF LIPASE: CPT | Performed by: EMERGENCY MEDICINE

## 2023-10-06 PROCEDURE — 83880 ASSAY OF NATRIURETIC PEPTIDE: CPT | Performed by: EMERGENCY MEDICINE

## 2023-10-06 PROCEDURE — 85610 PROTHROMBIN TIME: CPT

## 2023-10-06 PROCEDURE — 84484 ASSAY OF TROPONIN QUANT: CPT

## 2023-10-06 PROCEDURE — 70498 CT ANGIOGRAPHY NECK: CPT

## 2023-10-06 RX ORDER — ONDANSETRON 2 MG/ML
4 INJECTION INTRAMUSCULAR; INTRAVENOUS EVERY 6 HOURS PRN
Status: DISCONTINUED | OUTPATIENT
Start: 2023-10-06 | End: 2023-10-08 | Stop reason: HOSPADM

## 2023-10-06 RX ORDER — CALCIUM CARBONATE 500 MG/1
1000 TABLET, CHEWABLE ORAL DAILY PRN
Status: DISCONTINUED | OUTPATIENT
Start: 2023-10-06 | End: 2023-10-08 | Stop reason: HOSPADM

## 2023-10-06 RX ORDER — POLYETHYLENE GLYCOL 3350 17 G/17G
17 POWDER, FOR SOLUTION ORAL DAILY PRN
Status: DISCONTINUED | OUTPATIENT
Start: 2023-10-06 | End: 2023-10-08 | Stop reason: HOSPADM

## 2023-10-06 RX ORDER — METOPROLOL SUCCINATE 25 MG/1
25 TABLET, EXTENDED RELEASE ORAL DAILY
Status: DISCONTINUED | OUTPATIENT
Start: 2023-10-06 | End: 2023-10-08 | Stop reason: HOSPADM

## 2023-10-06 RX ORDER — ONDANSETRON 2 MG/ML
1 INJECTION INTRAMUSCULAR; INTRAVENOUS ONCE
Status: COMPLETED | OUTPATIENT
Start: 2023-10-06 | End: 2023-10-06

## 2023-10-06 RX ORDER — FUROSEMIDE 40 MG/1
20 TABLET ORAL 2 TIMES DAILY
Status: DISCONTINUED | OUTPATIENT
Start: 2023-10-06 | End: 2023-10-06

## 2023-10-06 RX ORDER — WARFARIN SODIUM 1 MG/1
1 TABLET ORAL
Status: DISCONTINUED | OUTPATIENT
Start: 2023-10-06 | End: 2023-10-08 | Stop reason: HOSPADM

## 2023-10-06 RX ORDER — FUROSEMIDE 10 MG/ML
25 INJECTION INTRAMUSCULAR; INTRAVENOUS
Status: DISCONTINUED | OUTPATIENT
Start: 2023-10-07 | End: 2023-10-07

## 2023-10-06 RX ORDER — AMLODIPINE BESYLATE 5 MG/1
5 TABLET ORAL DAILY
Status: DISCONTINUED | OUTPATIENT
Start: 2023-10-06 | End: 2023-10-08 | Stop reason: HOSPADM

## 2023-10-06 RX ORDER — FUROSEMIDE 10 MG/ML
25 INJECTION INTRAMUSCULAR; INTRAVENOUS DAILY
Status: DISCONTINUED | OUTPATIENT
Start: 2023-10-06 | End: 2023-10-06

## 2023-10-06 RX ORDER — ACETAMINOPHEN 325 MG/1
650 TABLET ORAL EVERY 4 HOURS PRN
Status: DISCONTINUED | OUTPATIENT
Start: 2023-10-06 | End: 2023-10-08 | Stop reason: HOSPADM

## 2023-10-06 RX ORDER — MECLIZINE HCL 12.5 MG/1
12.5 TABLET ORAL EVERY 8 HOURS PRN
Status: DISCONTINUED | OUTPATIENT
Start: 2023-10-06 | End: 2023-10-08 | Stop reason: HOSPADM

## 2023-10-06 RX ADMIN — FUROSEMIDE 25 MG: 10 INJECTION, SOLUTION INTRAMUSCULAR; INTRAVENOUS at 18:04

## 2023-10-06 RX ADMIN — IOHEXOL 100 ML: 350 INJECTION, SOLUTION INTRAVENOUS at 12:31

## 2023-10-06 RX ADMIN — ACETAMINOPHEN 650 MG: 325 TABLET, FILM COATED ORAL at 23:42

## 2023-10-06 NOTE — ASSESSMENT & PLAN NOTE
Wt Readings from Last 3 Encounters:   11/16/22 68.8 kg (151 lb 12.2 oz)       • Appears in exacerbation evident by hypertension, peripheral edema, SOB with activity, interstitial pulmonary edema on CT, elvated BNP  • Continue home metoprolol, amlodipine  • Diuresis daily IV lasix 25mg  • Daily standing weights  • Monitor I/Os, serial BMP  • Last echo 11/22 EF 55%, will repeat  • S/P TAVR, 12/21  • Consider Cardiology referral

## 2023-10-06 NOTE — ED PROVIDER NOTES
History  Chief Complaint   Patient presents with   • Syncope     Pt had a syncopal episode at home after vomiting. The pt did not have a fall no headstrike no injuries. Positive for blood thinners     Patient is an 80-year-old female past medical history of CHF, CKD, atrial fibrillation, hypertension, aortic valve replacement on Coumadin presenting for syncope. Patient states that she was dusting today when she began to feel vertigo that lasted for several minutes and nausea, vomited twice before syncopized in. Denies any chest pain or shortness of breath at any time. Denies any vertigo after syncopized in.  states that he was able to prevent any head trauma and try to support her. She denies any chest or abdominal pain, fevers, headache, dysuria, rashes, vision changes and states that she was in her normal health eating and drinking normally before this. Got Zofran and fluids on route but still notes mild nausea. Prior to Admission Medications   Prescriptions Last Dose Informant Patient Reported? Taking? amLODIPine (NORVASC) 5 mg tablet   No No   Sig: Take 1 tablet (5 mg total) by mouth daily Do not start before November 17, 2022. aspirin (ECOTRIN LOW STRENGTH) 81 mg EC tablet   No No   Sig: Take 1 tablet (81 mg total) by mouth daily Do not start before November 17, 2022.   furosemide (LASIX) 20 mg tablet   Yes No   Sig: Take 20 mg by mouth 2 (two) times a day   metoprolol succinate (TOPROL-XL) 25 mg 24 hr tablet   Yes No   Sig: Take 25 mg by mouth daily   warfarin (COUMADIN) 1 mg tablet   Yes No   Sig: Take 1 mg by mouth daily      Facility-Administered Medications: None       No past medical history on file. Past Surgical History:   Procedure Laterality Date   • CARDIAC CATHETERIZATION Left 11/15/2022    Procedure: Cardiac catheterization;  Surgeon: Pio Shabazz MD;  Location: [address] [place] [address] CATH LAB;   Service: Cardiology   • CARDIAC CATHETERIZATION N/A 11/15/2022    Procedure: Cardiac Coronary Angiogram;  Surgeon: Fifi Kwan MD;  Location: [address] [place] [address] CATH LAB; Service: Cardiology   • CARDIAC SURGERY         No family history on file. I have reviewed and agree with the history as documented. E-Cigarette/Vaping   • E-Cigarette Use Never User      E-Cigarette/Vaping Substances   • Nicotine No    • THC No    • CBD No    • Flavoring No      Social History     Tobacco Use   • Smoking status: Never   • Smokeless tobacco: Never   Vaping Use   • Vaping Use: Never used   Substance Use Topics   • Alcohol use: Yes     Alcohol/week: 3.0 standard drinks of alcohol     Types: 3 Glasses of wine per week   • Drug use: Never       Review of Systems   All other systems reviewed and are negative. Physical Exam  Physical Exam  Vitals reviewed. Constitutional:       General: She is not in acute distress. Appearance: Normal appearance. She is not ill-appearing. HENT:      Mouth/Throat:      Mouth: Mucous membranes are moist.   Eyes:      Extraocular Movements: Extraocular movements intact. Conjunctiva/sclera: Conjunctivae normal.      Pupils: Pupils are equal, round, and reactive to light. Cardiovascular:      Rate and Rhythm: Normal rate and regular rhythm. Pulses: Normal pulses. Heart sounds: Normal heart sounds. Pulmonary:      Effort: Pulmonary effort is normal.      Breath sounds: Normal breath sounds. Abdominal:      General: Abdomen is flat. Palpations: Abdomen is soft. Tenderness: There is no abdominal tenderness. Musculoskeletal:         General: No swelling. Normal range of motion. Cervical back: Neck supple. Right lower leg: No edema. Left lower leg: No edema. Skin:     General: Skin is warm and dry. Neurological:      General: No focal deficit present. Mental Status: She is alert. Cranial Nerves: No cranial nerve deficit. Motor: No weakness.       Coordination: Coordination normal.   Psychiatric:         Mood and Affect: Mood normal.         Vital Signs  ED Triage Vitals [10/06/23 1108]   Temperature Pulse Respirations Blood Pressure SpO2   97.8 °F (36.6 °C) 72 20 154/90 98 %      Temp Source Heart Rate Source Patient Position - Orthostatic VS BP Location FiO2 (%)   Oral Monitor -- Right arm --      Pain Score       --           Vitals:    10/06/23 1108   BP: 154/90   Pulse: 72         Visual Acuity      ED Medications  Medications   ondansetron (FOR EMS ONLY) (ZOFRAN) 4 mg/2 mL injection 4 mg (has no administration in time range)       Diagnostic Studies  Results Reviewed     Procedure Component Value Units Date/Time    Comprehensive metabolic panel [[id number]]     Lab Status: No result Specimen: Blood     CBC and differential [[id number]]     Lab Status: No result Specimen: Blood     HS Troponin 0hr (reflex protocol) [[id number]]     Lab Status: No result Specimen: Blood     B-Type Natriuretic Peptide(BNP) [[id number]]     Lab Status: No result Specimen: Blood     Lipase [[id number]]     Lab Status: No result Specimen: Blood                  CTA dissection protocol chest abdomen pelvis w wo contrast    (Results Pending)   XR chest 1 view portable    (Results Pending)              Procedures  ECG 12 Lead Documentation Only    Date/Time: 10/6/2023 11:31 AM    Performed by: Rip Baltazar DO  Authorized by: Rip Baltazar DO    Patient location:  ED  Previous ECG:     Previous ECG:  Compared to current    Similarity:  No change  Interpretation:     Interpretation: normal    Rate:     ECG rate assessment: normal    Rhythm:     Rhythm: atrial fibrillation    Ectopy:     Ectopy: none    QRS:     QRS axis:  Normal    QRS intervals:  Normal  Conduction:     Conduction: abnormal      Abnormal conduction: incomplete RBBB    ST segments:     ST segments:  Normal  T waves:     T waves: normal               ED Course  ED Course as of 10/06/23 1522   Fri Oct 06, 2023   1423 Patient with elevated troponin, possible subacute to chronic lacunar infarct, will admit for further evaluation. Medical Decision Making  Patient is a 1year-old female past medical history of hypertension, atrial fibrillation on Coumadin, aortic valve replacement, CHF, CKD presenting for syncope. Patient is well-appearing at bedside with stable vitals and in no acute distress. She has no abdominal tenderness, equal pulses, no gross normalities on neurologic exam and no other significant physical exam findings. Initial EKG unremarkable except for atrial fibrillation. Will obtain labs to assess for electrolyte abnormalities, anemia, troponin to assess for ACS, CT dissection CT CTA of the head and neck to assess for vascular injury, occlusion, other intra-abdominal pathology and continue to monitor. Patient is asymptomatic currently. Amount and/or Complexity of Data Reviewed  Labs: ordered. Radiology: ordered. Disposition  Final diagnoses:   None     ED Disposition     None      Follow-up Information    None         Patient's Medications   Discharge Prescriptions    No medications on file       No discharge procedures on file.     PDMP Review     None          ED Provider  Electronically Signed by           Antoine Eugene DO  10/06/23 0810

## 2023-10-06 NOTE — ASSESSMENT & PLAN NOTE
· Lab Results   Component Value Date    EGFR 38 10/06/2023    EGFR 51 11/16/2022    EGFR 53 11/15/2022    CREATININE 1.30 10/06/2023    CREATININE 1.01 11/16/2022    CREATININE 0.99 11/15/2022   · Appears stable at this time  · BMP in the am  · Avoid hypotension  · Avoid nephrotoxic medications

## 2023-10-06 NOTE — PLAN OF CARE
Problem: PAIN - ADULT  Goal: Verbalizes/displays adequate comfort level or baseline comfort level  Description: Interventions:  - Encourage patient to monitor pain and request assistance  - Assess pain using appropriate pain scale  - Administer analgesics based on type and severity of pain and evaluate response  - Implement non-pharmacological measures as appropriate and evaluate response  - Consider cultural and social influences on pain and pain management  - Notify physician/advanced practitioner if interventions unsuccessful or patient reports new pain  Outcome: Progressing     Problem: INFECTION - ADULT  Goal: Absence or prevention of progression during hospitalization  Description: INTERVENTIONS:  - Assess and monitor for signs and symptoms of infection  - Monitor lab/diagnostic results  - Monitor all insertion sites, i.e. indwelling lines, tubes, and drains  - Monitor endotracheal if appropriate and nasal secretions for changes in amount and color  - [place] appropriate cooling/warming therapies per order  - Administer medications as ordered  - Instruct and encourage patient and family to use good hand hygiene technique  - Identify and instruct in appropriate isolation precautions for identified infection/condition  Outcome: Progressing  Goal: Absence of fever/infection during neutropenic period  Description: INTERVENTIONS:  - Monitor WBC    Outcome: Progressing

## 2023-10-06 NOTE — ASSESSMENT & PLAN NOTE
· Background: dizziness, shortness of breath on exertion, syncope with fall, N/V  · R/O TIA vs Cadiac   · 24/hr tele, orthostatic BPs, MRI  · CTA Head and Neck: Negative  for large vessel occlusion, dissection, aneurysm, or high-grade stenosis. · Will order MRI  · CT Abdomen: Mild bibasal interstitial thickening, not evident in 2022. This may include bibasal interstitial edema versus fibrosis. Correlate with clinical findings.  Moderate cardiomegaly  · CXR: negative for acute cardiopulmonary disease  · Supportive therapy  · PRN antivert, zofran

## 2023-10-06 NOTE — ASSESSMENT & PLAN NOTE
· BP, elevated on admission, possibly secondary to fluid overload  · IV diuretics  · Continue metoprolol, amlodipine  · Orthostatic BPs

## 2023-10-06 NOTE — H&P
[address]  H&P  Name: Susana Garg 80 y.o. female I MRN: [id number]  Unit/Bed#: ED 10 I Date of Admission: 10/6/2023   Date of Service: 10/6/2023 I Hospital Day: 0      Assessment/Plan   * Syncope  Assessment & Plan  · Background: dizziness, shortness of breath on exertion, syncope with fall, N/V  · R/O TIA vs Cadiac  · 24/hr tele, orthostatic BPs, MRI  · CTA Head and Neck: Negative  for large vessel occlusion, dissection, aneurysm, or high-grade stenosis. · Will order MRI  · CT Abdomen: Mild bibasal interstitial thickening, not evident in 2022. This may include bibasal interstitial edema versus fibrosis. Correlate with clinical findings.  Moderate cardiomegaly  · CXR: negative for acute cardiopulmonary disease  · Supportive therapy  · PRN antivert, zofran    Chronic diastolic heart failure (HCC)  Assessment & Plan  Wt Readings from Last 3 Encounters:   11/16/22 68.8 kg (151 lb 12.2 oz)       • Appears in exacerbation evident by hypertension, peripheral edema, SOB with activity, interstitial pulmonary edema on CT, elvated BNP  • Continue home metoprolol, amlodipine  • Diuresis daily IV lasix 25mg  • Daily standing weights  • Monitor I/Os, serial BMP  • Last echo 11/22 EF 55%, will repeat  • S/P TAVR, 12/21  • Consider Cardiology referral        Atrial fibrillation [facility])  Assessment & Plan  · Rate controlled on metoprolol  · Coumadin for anticoagulation, daily INR goal 2-3        Hypertension  Assessment & Plan  · BP, elevated on admission, possibly secondary to fluid overload  · IV diuretics  · Continue metoprolol, amlodipine  · Orthostatic BPs    CKD (chronic kidney disease)  Assessment & Plan  · Lab Results   Component Value Date    EGFR 38 10/06/2023    EGFR 51 11/16/2022    EGFR 53 11/15/2022    CREATININE 1.30 10/06/2023    CREATININE 1.01 11/16/2022    CREATININE 0.99 11/15/2022   · Appears stable at this time  · BMP in the am  · Avoid hypotension  · Avoid nephrotoxic medications        VTE Pharmacologic Prophylaxis:   High Risk (Score >/= 5) - Pharmacological DVT Prophylaxis Ordered: warfarin (Coumadin). Sequential Compression Devices Ordered. Code Status: Level 3 - DNAR and DNI   Discussion with family: Updated  () at bedside. Anticipated Length of Stay: Patient will be admitted on an observation basis with an anticipated length of stay of less than 2 midnights secondary to syncope. Total Time Spent on Date of Encounter in care of patient: 75 mins. This time was spent on one or more of the following: performing physical exam; counseling and coordination of care; obtaining or reviewing history; documenting in the medical record; reviewing/ordering tests, medications or procedures; communicating with other healthcare professionals and discussing with patient's family/caregivers. Chief Complaint:    Chief Complaint   Patient presents with   • Syncope     Pt had a syncopal episode at home after vomiting. The pt did not have a fall no headstrike no injuries. Positive for blood thinners         History of Present Illness:  [place] is a 80 y.o. female with a PMH of HTN, HLD, A-Fib, S/P TAVR (2021), and CKD Stage III who presents with syncope. Patient reports while home dusting she because dizzy with 1 episode of vomiting. While trying to ambulate to recliner chair she passed out and fell. She noted over the last few weeks she has increased SOB with exertion. Patient denies chest pain, facial droop, slurred speech, headache, visual disturbances. She denies head injury from fall. EMS was alerted. Upon arrival to the ED she was noted to be hypertensive, CTA negative for large vessel occulusion. No neurological deficits noted on examination. Review of Systems:  Review of Systems   Constitutional: Positive for fatigue. HENT: Negative. Respiratory: Positive for shortness of breath. Cardiovascular: Positive for leg swelling. Gastrointestinal: Positive for nausea and vomiting. Genitourinary: Negative. Musculoskeletal: Negative. Skin: Negative. Neurological: Positive for syncope. Psychiatric/Behavioral: Negative. Past Medical and Surgical History:   No past medical history on file. Past Surgical History:   Procedure Laterality Date   • CARDIAC CATHETERIZATION Left 11/15/2022    Procedure: Cardiac catheterization;  Surgeon: Darius Watkins MD;  Location: [address] [place] [address] CATH LAB; Service: Cardiology   • CARDIAC CATHETERIZATION N/A 11/15/2022    Procedure: Cardiac Coronary Angiogram;  Surgeon: Darius Watkins MD;  Location: [address] [place] [address] CATH LAB; Service: Cardiology   • CARDIAC SURGERY         Meds/Allergies:  Prior to Admission medications    Medication Sig Start Date End Date Taking? Authorizing Provider   amLODIPine (NORVASC) 5 mg tablet Take 1 tablet (5 mg total) by mouth daily Do not start before November 17, 2022. 11/17/22 1/16/23  Wesly Lawrence MD   aspirin (ECOTRIN LOW STRENGTH) 81 mg EC tablet Take 1 tablet (81 mg total) by mouth daily Do not start before November 17, 2022. 11/17/22 1/16/23  Wesly Lawrence MD   furosemide (LASIX) 20 mg tablet Take 20 mg by mouth 2 (two) times a day    Historical Provider, MD   metoprolol succinate (TOPROL-XL) 25 mg 24 hr tablet Take 25 mg by mouth daily    Historical Provider, MD   warfarin (COUMADIN) 1 mg tablet Take 1 mg by mouth daily    Historical Provider, MD     I have reviewed home medications with patient personally.     Allergies: No Known Allergies    Social History:  Marital Status: /Civil Union   Occupation: retired  Patient Pre-hospital Living Situation: Home, With spouse  Patient Pre-hospital Level of Mobility: walks  Patient Pre-hospital Diet Restrictions: none  Substance Use History:   Social History     Substance and Sexual Activity   Alcohol Use Yes   • Alcohol/week: 3.0 standard drinks of alcohol   • Types: 3 Glasses of wine per week Social History     Tobacco Use   Smoking Status Never   Smokeless Tobacco Never     Social History     Substance and Sexual Activity   Drug Use Never       Family History:  No family history on file. Physical Exam:     Vitals:   Blood Pressure: (!) 176/86 (10/06/23 1515)  Pulse: 91 (10/06/23 1515)  Temperature: 97.8 °F (36.6 °C) (10/06/23 1108)  Temp Source: Oral (10/06/23 1108)  Respirations: 20 (10/06/23 1515)  SpO2: 93 % (10/06/23 1515)    Physical Exam  Vitals and nursing note reviewed. Constitutional:       General: She is not in acute distress. Appearance: She is well-developed. HENT:      Head: Normocephalic and atraumatic. Mouth/Throat:      Mouth: Mucous membranes are moist.   Eyes:      Conjunctiva/sclera: Conjunctivae normal.   Neck:      Vascular: No JVD. Cardiovascular:      Rate and Rhythm: Normal rate. Rhythm irregular. Pulses: Normal pulses. Heart sounds: Normal heart sounds. No murmur heard. Pulmonary:      Effort: Pulmonary effort is normal. No respiratory distress. Breath sounds: Normal breath sounds. Abdominal:      Palpations: Abdomen is soft. Tenderness: There is no abdominal tenderness. Musculoskeletal:      Cervical back: Neck supple. Right lower leg: Edema present. Left lower leg: Edema present. Skin:     General: Skin is warm and dry. Capillary Refill: Capillary refill takes less than 2 seconds. Neurological:      General: No focal deficit present. Mental Status: She is alert and oriented to person, place, and time.    Psychiatric:         Mood and Affect: Mood normal.         Behavior: Behavior normal.        Additional Data:     Lab Results:  Results from last 7 days   Lab Units 10/06/23  1147   WBC Thousand/uL 7.52   HEMOGLOBIN g/dL 13.0   HEMATOCRIT % 40.4   PLATELETS Thousands/uL 161   NEUTROS PCT % 76*   LYMPHS PCT % 13*   MONOS PCT % 7   EOS PCT % 2     Results from last 7 days   Lab Units 10/06/23  1147   SODIUM mmol/L 139   POTASSIUM mmol/L 4.0   CHLORIDE mmol/L 102   CO2 mmol/L 31   BUN mg/dL 23   CREATININE mg/dL 1.30   ANION GAP mmol/L 6   CALCIUM mg/dL 9.5   ALBUMIN g/dL 4.3   TOTAL BILIRUBIN mg/dL 0.96   ALK PHOS U/L 55   ALT U/L 16   AST U/L 32   GLUCOSE RANDOM mg/dL 111                       Lines/Drains:  Invasive Devices     None                     Imaging: Reviewed radiology reports from this admission including: chest xray, abdominal/pelvic CT and CT head  CTA dissection protocol chest abdomen pelvis w wo contrast   Final Result by Anupama Gloria MD (10/06 3489)      No acute intra-abdominal pathology. Mild bibasal interstitial thickening, not evident in 2022. This may include bibasal interstitial edema versus fibrosis. Correlate with clinical findings. Moderate cardiomegaly. Chronic findings, as per the body of the report. 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 This could be subacute or chronic in age. Recommend MRI brain without contrast for confirmation. Severe chronic microangiopathy. Negative CTA head and neck for large vessel occlusion, dissection, aneurysm, or high-grade stenosis. Additional chronic/incidental findings as detailed above. Please see same-day CTA chest abdomen pelvis with and without contrast for further evaluation. The study was marked in [facility] for immediate notification. Workstation performed: [id number]         XR chest 1 view portable   Final Result by Malou Adrian MD (10/06 0514)      No acute cardiopulmonary disease. Chronic pulmonary artery enlargement which can be seen with pulmonary hypertension.          Workstation performed: [id number]         MRI inpatient order    (Results Pending)       EKG and Other Studies Reviewed on Admission:   · EKG: Atrial fibrillation. HR 82.    ** Please Note: This note has been constructed using a voice recognition system.  **

## 2023-10-07 ENCOUNTER — APPOINTMENT (OUTPATIENT)
Dept: MRI IMAGING | Facility: HOSPITAL | Age: 83
End: 2023-10-07
Payer: MEDICARE

## 2023-10-07 PROBLEM — N18.31 STAGE 3A CHRONIC KIDNEY DISEASE (HCC): Status: ACTIVE | Noted: 2022-11-07

## 2023-10-07 LAB
APTT PPP: 46 SECONDS (ref 23–37)
CHOLEST SERPL-MCNC: 124 MG/DL
ERYTHROCYTE [DISTWIDTH] IN BLOOD BY AUTOMATED COUNT: 14 % (ref 11.6–15.1)
HCT VFR BLD AUTO: 37.8 % (ref 34.8–46.1)
HDLC SERPL-MCNC: 43 MG/DL
HGB BLD-MCNC: 12.4 G/DL (ref 11.5–15.4)
INR PPP: 2.54 (ref 0.84–1.19)
LDLC SERPL CALC-MCNC: 60 MG/DL (ref 0–100)
MCH RBC QN AUTO: 28.5 PG (ref 26.8–34.3)
MCHC RBC AUTO-ENTMCNC: 32.8 G/DL (ref 31.4–37.4)
MCV RBC AUTO: 87 FL (ref 82–98)
PLATELET # BLD AUTO: 159 THOUSANDS/UL (ref 149–390)
PMV BLD AUTO: 10 FL (ref 8.9–12.7)
PROTHROMBIN TIME: 28.3 SECONDS (ref 11.6–14.5)
RBC # BLD AUTO: 4.35 MILLION/UL (ref 3.81–5.12)
TRIGL SERPL-MCNC: 103 MG/DL
TSH SERPL DL<=0.05 MIU/L-ACNC: 2.79 UIU/ML (ref 0.45–4.5)
WBC # BLD AUTO: 5.69 THOUSAND/UL (ref 4.31–10.16)

## 2023-10-07 PROCEDURE — 84443 ASSAY THYROID STIM HORMONE: CPT

## 2023-10-07 PROCEDURE — 85027 COMPLETE CBC AUTOMATED: CPT

## 2023-10-07 PROCEDURE — 99232 SBSQ HOSP IP/OBS MODERATE 35: CPT | Performed by: PHYSICIAN ASSISTANT

## 2023-10-07 PROCEDURE — 99223 1ST HOSP IP/OBS HIGH 75: CPT | Performed by: INTERNAL MEDICINE

## 2023-10-07 PROCEDURE — 99223 1ST HOSP IP/OBS HIGH 75: CPT | Performed by: PSYCHIATRY & NEUROLOGY

## 2023-10-07 PROCEDURE — 80061 LIPID PANEL: CPT

## 2023-10-07 PROCEDURE — 85730 THROMBOPLASTIN TIME PARTIAL: CPT

## 2023-10-07 PROCEDURE — 70551 MRI BRAIN STEM W/O DYE: CPT

## 2023-10-07 PROCEDURE — 85610 PROTHROMBIN TIME: CPT

## 2023-10-07 RX ORDER — ATORVASTATIN CALCIUM 40 MG/1
40 TABLET, FILM COATED ORAL EVERY EVENING
Status: DISCONTINUED | OUTPATIENT
Start: 2023-10-07 | End: 2023-10-08 | Stop reason: HOSPADM

## 2023-10-07 RX ORDER — FUROSEMIDE 10 MG/ML
60 INJECTION INTRAMUSCULAR; INTRAVENOUS DAILY
Status: DISCONTINUED | OUTPATIENT
Start: 2023-10-07 | End: 2023-10-07

## 2023-10-07 RX ORDER — FUROSEMIDE 10 MG/ML
40 INJECTION INTRAMUSCULAR; INTRAVENOUS DAILY
Status: DISCONTINUED | OUTPATIENT
Start: 2023-10-07 | End: 2023-10-07

## 2023-10-07 RX ORDER — FUROSEMIDE 20 MG/1
20 TABLET ORAL DAILY
Status: DISCONTINUED | OUTPATIENT
Start: 2023-10-08 | End: 2023-10-08 | Stop reason: HOSPADM

## 2023-10-07 RX ORDER — ASPIRIN 81 MG/1
81 TABLET, CHEWABLE ORAL DAILY
Status: DISCONTINUED | OUTPATIENT
Start: 2023-10-07 | End: 2023-10-08 | Stop reason: HOSPADM

## 2023-10-07 RX ADMIN — WARFARIN SODIUM 1 MG: 1 TABLET ORAL at 17:31

## 2023-10-07 RX ADMIN — METOPROLOL SUCCINATE 25 MG: 25 TABLET, EXTENDED RELEASE ORAL at 08:58

## 2023-10-07 RX ADMIN — ASPIRIN 81 MG: 81 TABLET, CHEWABLE ORAL at 17:31

## 2023-10-07 RX ADMIN — ATORVASTATIN CALCIUM 40 MG: 40 TABLET, FILM COATED ORAL at 17:31

## 2023-10-07 NOTE — PLAN OF CARE
Problem: PAIN - ADULT  Goal: Verbalizes/displays adequate comfort level or baseline comfort level  Description: Interventions:  - Encourage patient to monitor pain and request assistance  - Assess pain using appropriate pain scale  - Administer analgesics based on type and severity of pain and evaluate response  - Implement non-pharmacological measures as appropriate and evaluate response  - Consider cultural and social influences on pain and pain management  - Notify physician/advanced practitioner if interventions unsuccessful or patient reports new pain  Outcome: Progressing     Problem: INFECTION - ADULT  Goal: Absence or prevention of progression during hospitalization  Description: INTERVENTIONS:  - Assess and monitor for signs and symptoms of infection  - Monitor lab/diagnostic results  - Monitor all insertion sites, i.e. indwelling lines, tubes, and drains  - Monitor endotracheal if appropriate and nasal secretions for changes in amount and color  - [place] appropriate cooling/warming therapies per order  - Administer medications as ordered  - Instruct and encourage patient and family to use good hand hygiene technique  - Identify and instruct in appropriate isolation precautions for identified infection/condition  Outcome: Progressing  Goal: Absence of fever/infection during neutropenic period  Description: INTERVENTIONS:  - Monitor WBC    Outcome: Progressing     Problem: SAFETY ADULT  Goal: Patient will remain free of falls  Description: INTERVENTIONS:  - Educate patient/family on patient safety including physical limitations  - Instruct patient to call for assistance with activity   - Consult OT/PT to assist with strengthening/mobility   - Keep Call bell within reach  - Keep bed low and locked with side rails adjusted as appropriate  - Keep care items and personal belongings within reach  - Initiate and maintain comfort rounds  - Make Fall Risk Sign visible to staff  - Offer Toileting every  Hours, in advance of need  - Initiate/Maintain alarm  - Obtain necessary fall risk management equipment:   - Apply yellow socks and bracelet for high fall risk patients  - Consider moving patient to room near nurses station  Outcome: Progressing  Goal: Maintain or return to baseline ADL function  Description: INTERVENTIONS:  -  Assess patient's ability to carry out ADLs; assess patient's baseline for ADL function and identify physical deficits which impact ability to perform ADLs (bathing, care of mouth/teeth, toileting, grooming, dressing, etc.)  - Assess/evaluate cause of self-care deficits   - Assess range of motion  - Assess patient's mobility; develop plan if impaired  - Assess patient's need for assistive devices and provide as appropriate  - Encourage maximum independence but intervene and supervise when necessary  - Involve family in performance of ADLs  - Assess for home care needs following discharge   - Consider OT consult to assist with ADL evaluation and planning for discharge  - Provide patient education as appropriate  Outcome: Progressing  Goal: Maintains/Returns to pre admission functional level  Description: INTERVENTIONS:  - Perform BMAT or MOVE assessment daily.   - Set and communicate daily mobility goal to care team and patient/family/caregiver. - Collaborate with rehabilitation services on mobility goals if consulted  - Perform Range of Motion  times a day. - Reposition patient every  hours.   - Dangle patient  times a day  - Stand patient  times a day  - Ambulate patient  times a day  - Out of bed to chair  times a day   - Out of bed for meals times a day  - Out of bed for toileting  - Record patient progress and toleration of activity level   Outcome: Progressing     Problem: DISCHARGE PLANNING  Goal: Discharge to home or other facility with appropriate resources  Description: INTERVENTIONS:  - Identify barriers to discharge w/patient and caregiver  - Arrange for needed discharge resources and transportation as appropriate  - Identify discharge learning needs (meds, wound care, etc.)  - Arrange for interpretive services to assist at discharge as needed  - Refer to Case Management Department for coordinating discharge planning if the patient needs post-hospital services based on physician/advanced practitioner order or complex needs related to functional status, cognitive ability, or social support system  Outcome: Progressing

## 2023-10-07 NOTE — ASSESSMENT & PLAN NOTE
This patient was admitted yesterday after she had complaints of feeling dizzy while dusting both higher and lower surfaces. She thought she had better sit down and she felt spinning. She went to another room to go lay down and her  caught her because she passed out. She did not have a head strike. She was brought here to the hospital yesterday and the patient had a CTA done and she was noted to have a question of a new subacute/chronic stroke. Neurology is asked to see this patient. There is no neurologic evidence either on exam or from the patient's report her database here that her syncopal episode was epileptic in origin. Concerning whether or not this may have been a reaction to a possible stroke is unlikely. Her MRI had no acute ischemia appreciated on her diffusion-weighted images. This patient does however have significant small vessel disease throughout the bilateral hemispheres. She had no large vessel occlusions in either the head or the neck suspicious for a symptomatic stenosis. From a neurologic point of view we do not feel she needs an EEG at this point. We would keep her on her Coumadin but add low-dose baby aspirin 81 mg because of the severe degree of small vessel atherosclerosis she has visible on her MRI FLAIR images. This patient can follow-up with us in the office as an outpatient nonemergent basis.

## 2023-10-07 NOTE — ASSESSMENT & PLAN NOTE
Appears in exacerbation evident by hypertension, peripheral edema, SOB with activity, interstitial pulmonary edema on CT, elevated BNP  • Continue home metoprolol, amlodipine  • Given exacerbation, will order 2.5x home dose; 60mg AM and 40mg PM for total 100 mg daily   • Daily standing weights  • Monitor I/Os, serial BMP  • Last echo 11/22 LVEF 55%  • s/p TAVR 12/21  • Follow up repeat echo

## 2023-10-07 NOTE — PHYSICAL THERAPY NOTE
Physical Therapy Evaluation     Patient's Name: Ulises Tello       10/07/23 1415   PT Last Visit   PT Visit Date 10/07/23   Note Type   Note type Screen   Additional Comments Chart reviewed. Pt was being brought back from MRI, and was able to get up out of the chair and walk around the room independently. Pt was able to tap dance and [place]. No need for skilled therapy identified. If patient experiences a change in medical status, PT will reconsult as appropriate. Admitting Diagnosis  Chronic diastolic heart failure (HCC) [I50.32]  Syncope [R55]  CVA (cerebral vascular accident) ([address]) [I63.9]  Elevated troponin [R79.89]  Atrial fibrillation, unspecified type ([address]) [I48.91]  Chronic kidney disease, unspecified CKD stage [N18.9]    Problem List  Patient Active Problem List   Diagnosis    Atrial fibrillation ([address])    Hypertension    Weakness    Chronic diastolic heart failure (HCC)    Elevated troponin    Stage 3a chronic kidney disease ([address])    H/O aortic valve replacement    Syncope       Past Medical History  No past medical history on file. Past Surgical History  Past Surgical History:   Procedure Laterality Date    CARDIAC CATHETERIZATION Left 11/15/2022    Procedure: Cardiac catheterization;  Surgeon: Michelle Fernandez MD;  Location: [address] [place] [address] CATH LAB; Service: Cardiology    CARDIAC CATHETERIZATION N/A 11/15/2022    Procedure: Cardiac Coronary Angiogram;  Surgeon: Michelle Fernandez MD;  Location: [address] [place] [address] CATH LAB;   Service: Cardiology    CARDIAC SURGERY               Nathan Umana, KOBE

## 2023-10-07 NOTE — PLAN OF CARE
Problem: PAIN - ADULT  Goal: Verbalizes/displays adequate comfort level or baseline comfort level  Description: Interventions:  - Encourage patient to monitor pain and request assistance  - Assess pain using appropriate pain scale  - Administer analgesics based on type and severity of pain and evaluate response  - Implement non-pharmacological measures as appropriate and evaluate response  - Consider cultural and social influences on pain and pain management  - Notify physician/advanced practitioner if interventions unsuccessful or patient reports new pain  10/6/2023 2103 by Raymundo Ambrose RN  Outcome: Progressing  10/6/2023 2103 by Raymundo Ambrose RN  Outcome: Progressing     Problem: INFECTION - ADULT  Goal: Absence or prevention of progression during hospitalization  Description: INTERVENTIONS:  - Assess and monitor for signs and symptoms of infection  - Monitor lab/diagnostic results  - Monitor all insertion sites, i.e. indwelling lines, tubes, and drains  - Monitor endotracheal if appropriate and nasal secretions for changes in amount and color  - [place] appropriate cooling/warming therapies per order  - Administer medications as ordered  - Instruct and encourage patient and family to use good hand hygiene technique  - Identify and instruct in appropriate isolation precautions for identified infection/condition  10/6/2023 2103 by Raymundo Ambrose RN  Outcome: Progressing  10/6/2023 2103 by Raymundo Ambrose RN  Outcome: Progressing  Goal: Absence of fever/infection during neutropenic period  Description: INTERVENTIONS:  - Monitor WBC    10/6/2023 2103 by Raymundo Ambrose RN  Outcome: Progressing  10/6/2023 2103 by Raymundo Ambrose RN  Outcome: Progressing     Problem: SAFETY ADULT  Goal: Patient will remain free of falls  Description: INTERVENTIONS:  - Educate patient/family on patient safety including physical limitations  - Instruct patient to call for assistance with activity   - Consult OT/PT to assist with strengthening/mobility   - Keep Call bell within reach  - Keep bed low and locked with side rails adjusted as appropriate  - Keep care items and personal belongings within reach  - Initiate and maintain comfort rounds  - Make Fall Risk Sign visible to staff  - Offer Toileting every  Hours, in advance of need  - Initiate/Maintain alarm  - Obtain necessary fall risk management equipment:   - Apply yellow socks and bracelet for high fall risk patients  - Consider moving patient to room near nurses station  10/6/2023 2103 by David Alcaraz RN  Outcome: Progressing  10/6/2023 2103 by David Alcaraz RN  Outcome: Progressing  Goal: Maintain or return to baseline ADL function  Description: INTERVENTIONS:  -  Assess patient's ability to carry out ADLs; assess patient's baseline for ADL function and identify physical deficits which impact ability to perform ADLs (bathing, care of mouth/teeth, toileting, grooming, dressing, etc.)  - Assess/evaluate cause of self-care deficits   - Assess range of motion  - Assess patient's mobility; develop plan if impaired  - Assess patient's need for assistive devices and provide as appropriate  - Encourage maximum independence but intervene and supervise when necessary  - Involve family in performance of ADLs  - Assess for home care needs following discharge   - Consider OT consult to assist with ADL evaluation and planning for discharge  - Provide patient education as appropriate  10/6/2023 2103 by David Alcaraz RN  Outcome: Progressing  10/6/2023 2103 by David Alcaraz RN  Outcome: Progressing  Goal: Maintains/Returns to pre admission functional level  Description: INTERVENTIONS:  - Perform BMAT or MOVE assessment daily.   - Set and communicate daily mobility goal to care team and patient/family/caregiver. - Collaborate with rehabilitation services on mobility goals if consulted  - Perform Range of Motion  times a day. - Reposition patient every  hours.   - Dangle patient  times a day  - Stand patient  times a day  - Ambulate patient  times a day  - Out of bed to chair  times a day   - Out of bed for meal times a day  - Out of bed for toileting  - Record patient progress and toleration of activity level   Outcome: Progressing     Problem: DISCHARGE PLANNING  Goal: Discharge to home or other facility with appropriate resources  Description: INTERVENTIONS:  - Identify barriers to discharge w/patient and caregiver  - Arrange for needed discharge resources and transportation as appropriate  - Identify discharge learning needs (meds, wound care, etc.)  - Arrange for interpretive services to assist at discharge as needed  - Refer to Case Management Department for coordinating discharge planning if the patient needs post-hospital services based on physician/advanced practitioner order or complex needs related to functional status, cognitive ability, or social support system  Outcome: Progressing     Problem: Knowledge Deficit  Goal: Patient/family/caregiver demonstrates understanding of disease process, treatment plan, medications, and discharge instructions  Description: Complete learning assessment and assess knowledge base.   Interventions:  - Provide teaching at level of understanding  - Provide teaching via preferred learning methods  Outcome: Progressing

## 2023-10-07 NOTE — ASSESSMENT & PLAN NOTE
This patient reports a long history of A-fib. She reports she is compliant all the time and therapeutic with her Coumadin. At the time of admission presentation she is at 2.5. We would not suggest any changes at this time.

## 2023-10-07 NOTE — ASSESSMENT & PLAN NOTE
· CKD 3a/b, difficult to ascertain accurate baseline given lack of labs  · Last Cr 1.4 from 6/23  · Avoid hypotension, nephrotoxins  · Daily BMP  · I/Os

## 2023-10-07 NOTE — ASSESSMENT & PLAN NOTE
Presents with dizziness, shortness of breath on exertion, syncope with fall, episode of emesis. No trauma, no postictal state    · CTA Head and Neck: Age-indeterminate lacunar infarct in right thalamocapsular junction, new since 11/7/2022. This could be subacute or chronic in age. Negative  for large vessel occlusion, dissection, aneurysm, or high-grade stenosis. · CT Abdomen: Mild bibasal interstitial thickening, not evident in 2022. This may include bibasal interstitial edema versus fibrosis. Correlate with clinical findings.  Moderate cardiomegaly  · CXR: Negative for acute cardiopulmonary disease  · , HsTnI flat 64-57-55  · CBC,CMP wnl  · Monitor on tele, follow up MRI  · PRN antivert, zofran  · Neurology and cardiology consults appreciated

## 2023-10-07 NOTE — PLAN OF CARE
Problem: PAIN - ADULT  Goal: Verbalizes/displays adequate comfort level or baseline comfort level  Description: Interventions:  - Encourage patient to monitor pain and request assistance  - Assess pain using appropriate pain scale  - Administer analgesics based on type and severity of pain and evaluate response  - Implement non-pharmacological measures as appropriate and evaluate response  - Consider cultural and social influences on pain and pain management  - Notify physician/advanced practitioner if interventions unsuccessful or patient reports new pain  10/7/2023 1914 by Anupama Michael RN  Outcome: Progressing  10/7/2023 1914 by Anupama Michael RN  Outcome: Progressing     Problem: INFECTION - ADULT  Goal: Absence or prevention of progression during hospitalization  Description: INTERVENTIONS:  - Assess and monitor for signs and symptoms of infection  - Monitor lab/diagnostic results  - Monitor all insertion sites, i.e. indwelling lines, tubes, and drains  - Monitor endotracheal if appropriate and nasal secretions for changes in amount and color  - [place] appropriate cooling/warming therapies per order  - Administer medications as ordered  - Instruct and encourage patient and family to use good hand hygiene technique  - Identify and instruct in appropriate isolation precautions for identified infection/condition  10/7/2023 1914 by Anupama Michael RN  Outcome: Progressing  10/7/2023 1914 by Anupama Michael RN  Outcome: Progressing  Goal: Absence of fever/infection during neutropenic period  Description: INTERVENTIONS:  - Monitor WBC    10/7/2023 1914 by Anupama Michael RN  Outcome: Progressing  10/7/2023 1914 by Anupama Michael RN  Outcome: Progressing     Problem: SAFETY ADULT  Goal: Patient will remain free of falls  Description: INTERVENTIONS:  - Educate patient/family on patient safety including physical limitations  - Instruct patient to call for assistance with activity   - Consult OT/PT to assist with strengthening/mobility   - Keep Call bell within reach  - Keep bed low and locked with side rails adjusted as appropriate  - Keep care items and personal belongings within reach  - Initiate and maintain comfort rounds  - Make Fall Risk Sign visible to staff  - Offer Toileting every  Hours, in advance of need  - Initiate/Maintain alarm  - Obtain necessary fall risk management equipment:   - Apply yellow socks and bracelet for high fall risk patients  - Consider moving patient to room near nurses station  10/7/2023 1914 by Linda Paige RN  Outcome: Progressing  10/7/2023 1914 by Linda Paige RN  Outcome: Progressing  Goal: Maintain or return to baseline ADL function  Description: INTERVENTIONS:  -  Assess patient's ability to carry out ADLs; assess patient's baseline for ADL function and identify physical deficits which impact ability to perform ADLs (bathing, care of mouth/teeth, toileting, grooming, dressing, etc.)  - Assess/evaluate cause of self-care deficits   - Assess range of motion  - Assess patient's mobility; develop plan if impaired  - Assess patient's need for assistive devices and provide as appropriate  - Encourage maximum independence but intervene and supervise when necessary  - Involve family in performance of ADLs  - Assess for home care needs following discharge   - Consider OT consult to assist with ADL evaluation and planning for discharge  - Provide patient education as appropriate  10/7/2023 1914 by Linda Paige RN  Outcome: Progressing  10/7/2023 1914 by Linda Paige RN  Outcome: Progressing  Goal: Maintains/Returns to pre admission functional level  Description: INTERVENTIONS:  - Perform BMAT or MOVE assessment daily.   - Set and communicate daily mobility goal to care team and patient/family/caregiver. - Collaborate with rehabilitation services on mobility goals if consulted  - Perform Range of Motion times a day. - Reposition patient every  hours.   - Dangle patient  times a day  - Stand patient times a day  - Ambulate patient  times a day  - Out of bed to chair  times a day   - Out of bed for m times a day  - Out of bed for toileting  - Record patient progress and toleration of activity level   10/7/2023 1914 by Lizzeth Resendiz RN  Outcome: Progressing  10/7/2023 1914 by Lizzeth Resendiz RN  Outcome: Progressing     Problem: DISCHARGE PLANNING  Goal: Discharge to home or other facility with appropriate resources  Description: INTERVENTIONS:  - Identify barriers to discharge w/patient and caregiver  - Arrange for needed discharge resources and transportation as appropriate  - Identify discharge learning needs (meds, wound care, etc.)  - Arrange for interpretive services to assist at discharge as needed  - Refer to Case Management Department for coordinating discharge planning if the patient needs post-hospital services based on physician/advanced practitioner order or complex needs related to functional status, cognitive ability, or social support system  10/7/2023 1914 by Lizzeth Resendiz RN  Outcome: Progressing  10/7/2023 1914 by Lizzeth Resendiz RN  Outcome: Progressing     Problem: Knowledge Deficit  Goal: Patient/family/caregiver demonstrates understanding of disease process, treatment plan, medications, and discharge instructions  Description: Complete learning assessment and assess knowledge base.   Interventions:  - Provide teaching at level of understanding  - Provide teaching via preferred learning methods  10/7/2023 1914 by Lizzeth Resendiz RN  Outcome: Progressing  10/7/2023 1914 by Lizzeth Resendiz RN  Outcome: Progressing

## 2023-10-07 NOTE — ASSESSMENT & PLAN NOTE
· Rate controlled on metoprolol  · Coumadin for anticoagulation, daily INR goal 2-3  · Monitor INR; currently 2.54

## 2023-10-07 NOTE — ASSESSMENT & PLAN NOTE
· Chronic; elevated on admission, possibly secondary to fluid overload  · Continue metoprolol, amlodipine  BP is better, Blood Pressure: 138/72  · Follow up on orthostatic vitals

## 2023-10-07 NOTE — PROGRESS NOTES
[address]  Progress Note  Name: Rod Kramer  MRN: [id number]  Unit/Bed#: -01 I Date of Admission: 10/6/2023   Date of Service: 10/7/2023 I Hospital Day: 0    Assessment/Plan   * Syncope  Assessment & Plan  Presents with dizziness, shortness of breath on exertion, syncope with fall, episode of emesis. No trauma, no postictal state    · CTA Head and Neck: Age-indeterminate lacunar infarct in right thalamocapsular junction, new since 11/7/2022. This could be subacute or chronic in age. Negative  for large vessel occlusion, dissection, aneurysm, or high-grade stenosis. · CT Abdomen: Mild bibasal interstitial thickening, not evident in 2022. This may include bibasal interstitial edema versus fibrosis. Correlate with clinical findings.  Moderate cardiomegaly  · CXR: Negative for acute cardiopulmonary disease  · , HsTnI flat 64-57-55  · CBC,CMP wnl  · Monitor on tele, follow up MRI  · PRN antivert, zofran  · Neurology and cardiology consults appreciated     Atrial fibrillation [facility])  Assessment & Plan  · Rate controlled on metoprolol  · Coumadin for anticoagulation, daily INR goal 2-3  · Monitor INR; currently 2.54    Chronic diastolic heart failure ([address])  Assessment & Plan  Appears in exacerbation evident by hypertension, peripheral edema, SOB with activity, interstitial pulmonary edema on CT, elevated BNP  • Continue home metoprolol, amlodipine  • Given exacerbation, will order 2.5x home dose; 60mg AM and 40mg PM for total 100 mg daily   • Daily standing weights  • Monitor I/Os, serial BMP  • Last echo 11/22 LVEF 55%  • s/p TAVR 12/21  • Follow up repeat echo       Stage 3a chronic kidney disease ([address])  Assessment & Plan  · CKD 3a/b, difficult to ascertain accurate baseline given lack of labs  · Last Cr 1.4 from 6/23  · Avoid hypotension, nephrotoxins  · Daily BMP  · I/Os     Hypertension  Assessment & Plan  · Chronic; elevated on admission, possibly secondary to fluid overload  · Continue metoprolol, amlodipine  BP is better, Blood Pressure: 138/72  · Follow up on orthostatic vitals            VTE Pharmacologic Prophylaxis: VTE Score: 5 High Risk (Score >/= 5) - Pharmacological DVT Prophylaxis Ordered: warfarin (Coumadin). Sequential Compression Devices Ordered. Patient Centered Rounds: I performed bedside rounds with nursing staff today. Discussions with Specialists or Other Care Team Provider: cards, neuro      Education and Discussions with Family / Patient: Updated  () at bedside. Total Time Spent on Date of Encounter in care of patient: 40 mins. This time was spent on one or more of the following: performing physical exam; counseling and coordination of care; obtaining or reviewing history; documenting in the medical record; reviewing/ordering tests, medications or procedures; communicating with other healthcare professionals and discussing with patient's family/caregivers. Current Length of Stay: 0 day(s)  Current Patient Status: Observation   Certification Statement: The patient, admitted on an observation basis, will now require > 2 midnight hospital stay due to ongoing workup of syncope, possible stroke  Discharge Plan: Anticipate discharge in 24-48 hrs to discharge location to be determined pending rehab evaluations. Code Status: Level 3 - DNAR and DNI    Subjective:   Patient seen at the bedside with her  present. Denies additional issues or new neurologic symptoms. She is bummed to have to stay, but understands need for additional workup. Objective:     Vitals:   Temp (24hrs), Av.8 °F (36.6 °C), Min:97.5 °F (36.4 °C), Max:98.3 °F (36.8 °C)    Temp:  [97.5 °F (36.4 °C)-98.3 °F (36.8 °C)] 97.5 °F (36.4 °C)  HR:  [] 81  Resp:  [18-20] 18  BP: (111-176)/(62-97) 111/62  SpO2:  [90 %-94 %] 94 %  Body mass index is 30.66 kg/m². Input and Output Summary (last 24 hours):      Intake/Output Summary (Last 24 hours) at 10/7/2023 1240  Last data filed at 10/7/2023 0901  Gross per 24 hour   Intake --   Output 700 ml   Net -700 ml       Physical Exam:   Physical Exam  Vitals and nursing note reviewed. Constitutional:       Appearance: Normal appearance. She is not ill-appearing or toxic-appearing. Cardiovascular:      Rate and Rhythm: Normal rate. Rhythm irregularly irregular. Heart sounds: Murmur heard. Pulmonary:      Effort: Pulmonary effort is normal. No respiratory distress. Breath sounds: Normal breath sounds. No wheezing. Abdominal:      General: Bowel sounds are normal. There is no distension. Palpations: Abdomen is soft. Tenderness: There is no abdominal tenderness. Musculoskeletal:      Right lower leg: No edema. Left lower leg: No edema. Skin:     General: Skin is warm and dry. Coloration: Skin is not pale. Neurological:      General: No focal deficit present. Mental Status: She is alert and oriented to person, place, and time. Psychiatric:         Mood and Affect: Mood normal.         Behavior: Behavior normal.           Additional Data:     Labs:  Results from last 7 days   Lab Units 10/07/23  0603 10/06/23  1147   WBC Thousand/uL 5.69 7.52   HEMOGLOBIN g/dL 12.4 13.0   HEMATOCRIT % 37.8 40.4   PLATELETS Thousands/uL 159 161   NEUTROS PCT %  --  76*   LYMPHS PCT %  --  13*   MONOS PCT %  --  7   EOS PCT %  --  2     Results from last 7 days   Lab Units 10/06/23  1147   SODIUM mmol/L 139   POTASSIUM mmol/L 4.0   CHLORIDE mmol/L 102   CO2 mmol/L 31   BUN mg/dL 23   CREATININE mg/dL 1.30   ANION GAP mmol/L 6   CALCIUM mg/dL 9.5   ALBUMIN g/dL 4.3   TOTAL BILIRUBIN mg/dL 0.96   ALK PHOS U/L 55   ALT U/L 16   AST U/L 32   GLUCOSE RANDOM mg/dL 111     Results from last 7 days   Lab Units 10/07/23  0603   INR  2.54*                   Lines/Drains:  Invasive Devices     Peripheral Intravenous Line  Duration           Peripheral IV 10/06/23 Dorsal (posterior); Right Hand <1 day Peripheral IV 10/06/23 Right Antecubital <1 day                  Telemetry:  Telemetry Orders (From admission, onward)             24 Hour Telemetry Monitoring  Continuous x 24 Hours (Telem)        Question:  Reason for 24 Hour Telemetry  Answer:  Syncope suspected to be cardiac in origin                 Telemetry Reviewed: Atrial fibrillation. HR averaging 80s  Indication for Continued Telemetry Use: Syncope             Imaging: Reviewed radiology reports from this admission including: chest xray and CTA h/n, CTA dissection reports    Recent Cultures (last 7 days):         Last 24 Hours Medication List:   Current Facility-Administered Medications   Medication Dose Route Frequency Provider Last Rate   • acetaminophen  650 mg Oral Q4H PRN NOE Tomas     • amLODIPine  5 mg Oral Daily NOE Tomas     • atorvastatin  40 mg Oral QPM Mary Butler PA-C     • calcium carbonate  1,000 mg Oral Daily PRN NOE Tomas     • furosemide  60 mg Intravenous Daily Mary Butler PA-C      And   • furosemide  40 mg Intravenous Daily Mary Butler PA-C     • meclizine  12.5 mg Oral Q8H PRN NOE Tomas     • metoprolol succinate  25 mg Oral Daily NOE Tomas     • ondansetron  4 mg Intravenous Q6H PRN NOE Tomas     • polyethylene glycol  17 g Oral Daily PRN NOE Tomas     • warfarin  1 mg Oral Daily (warfarin) NOE Tomas          Today, Patient Was Seen By: Nya Cain PA-C    **Please Note: This note may have been constructed using a voice recognition system. **

## 2023-10-07 NOTE — CONSULTS
[address]  Neuro Consult - Name: Paulino Apple 80 y.o. female   I MRN: [id number] Unit/Bed#: -01 I   Date of Admission: 10/6/2023  Date of Service: 10/7/2023 I Hospital Day: 0    Inpatient consult to Neurology  Consult performed by: NOE Zamora  Consult ordered by: Milton Monique PA-C         Reason for Consult / Principal Problem: Question of subacute stroke as a new radiologic finding  Hx and PE limited by: None  Review of previous medical records was completed. Family, was not present at the bedside for history and examination    Assessment/Plan   * Syncope  Assessment & Plan  This patient was admitted yesterday after she had complaints of feeling dizzy while dusting both higher and lower surfaces. She thought she had better sit down and she felt spinning. She went to another room to go lay down and her  caught her because she passed out. She did not have a head strike. She was brought here to the hospital yesterday and the patient had a CTA done and she was noted to have a question of a new subacute/chronic stroke. Neurology is asked to see this patient. There is no neurologic evidence either on exam or from the patient's report her database here that her syncopal episode was epileptic in origin. Concerning whether or not this may have been a reaction to a possible stroke is unlikely. Her MRI had no acute ischemia appreciated on her diffusion-weighted images. This patient does however have significant small vessel disease throughout the bilateral hemispheres. She had no large vessel occlusions in either the head or the neck suspicious for a symptomatic stenosis. From a neurologic point of view we do not feel she needs an EEG at this point. We would keep her on her Coumadin but add low-dose baby aspirin 81 mg because of the severe degree of small vessel atherosclerosis she has visible on her MRI FLA[facility] images.   This patient can follow-up with us in the office as an outpatient nonemergent basis. Atrial fibrillation [facility])  Assessment & Plan  This patient reports a long history of A-fib. She reports she is compliant all the time and therapeutic with her Coumadin. At the time of admission presentation she is at 2.5. We would not suggest any changes at this time. This patient can be seen nonurgently in our neuro offices in 1 to 2 months. We have started her on low-dose aspirin, 81 mg, as she has significant in her cerebral small vessel disease. I have also briefly discussed with this patient she should follow-up with her family doctor concerning osteoporosis and whether or not she should be adopting calcium and vitamin D.      HPI: Sivan Bennett is a right handed  80 y.o. female who has no prior history of neurologic disease. She does have a medical history significant for hypertension CHF A-fib compliant and therapeutic on Coumadin also with a history of a bioprosthetic valve as well as CKD. She presented to the hospital yesterday after having complaints of dizziness and room spinning at home. Apparently she was dusting, both higher as well as lower surfaces when she felt was going to pass out. She was able to sit down she knew that she did not want a fall. She reports she felt like there was a spinning sensation and she felt nauseous. She apparently passed out as she was trying to move to a different room. Her  was able to catch her and prevent a fall. She has been here overnight she had a CT done and is noted to have what appears to be a subacute subcortical infarct. Neurology is asked to comment as this was not present on her November 2022 films. The patient happened to have gotten her MRI to assess for an ischemic injury at the time that we were seeing this patient. Her MRI is negative for stroke. ROS: 12 system cued query: She currently reports she feels well. She has no residual complaints.   She denies any ongoing headache visual disturbances bulbar complaints chest pain shortness of breath or palpitations, no weakness no dizziness about moving no anxiety. Historical Information     No past medical history on file. Past Surgical History:   Procedure Laterality Date   • CARDIAC CATHETERIZATION Left 11/15/2022    Procedure: Cardiac catheterization;  Surgeon: Althea Magana MD;  Location: [address] [place] [address] CATH LAB; Service: Cardiology   • CARDIAC CATHETERIZATION N/A 11/15/2022    Procedure: Cardiac Coronary Angiogram;  Surgeon: Althea Magana MD;  Location: [address] [place] [address] CATH LAB; Service: Cardiology   • CARDIAC SURGERY         Social History : M, lives with her . She is a non-smoker no alcohol no recreational's. Family History: No family history on file. No Known Allergies  Meds:She reports she is compliant with her meds particularly her Coumadin she reports she always watches and that she is generally always therapeutic. She is not on aspirin. Scheduled Meds:  Medication Dose Route Frequency   • acetaminophen  650 mg Oral Q4H PRN   • amLODIPine  5 mg Oral Daily   • atorvastatin  40 mg Oral QPM   • calcium carbonate  1,000 mg Oral Daily PRN   • furosemide  20 mg Oral Daily   • meclizine  12.5 mg Oral Q8H PRN   • metoprolol succinate  25 mg Oral Daily   • ondansetron  4 mg Intravenous Q6H PRN   • polyethylene glycol  17 g Oral Daily PRN   • warfarin  1 mg Oral Daily (warfarin)     PRN Meds:.•  acetaminophen  •  calcium carbonate  •  meclizine  •  ondansetron  •  polyethylene glycol      Physical Exam:   Objective   Vitals:Blood pressure 111/62, pulse 81, temperature 97.5 °F (36.4 °C), temperature source Oral, resp. rate 18, height 5' (1.524 m), weight 71.2 kg (156 lb 15.5 oz), SpO2 94 %. ,Body mass index is 30.66 kg/m².       Patient was examined in bedside chair there is no family present  General: alert, appears stated age and cooperative  Head: Normocephalic, without obvious abnormality, atraumatic  Oral exam: lips, mucosa, and tongue moist;   Neck: no carotid bruit,   Lungs: clear to auscultation ant. bilaterally  Heart: regular rate and rhythm, S1, S2 normal, no murmur appreciated,   Abdomen: soft, +BS    Extremities: atraumatic, no cyanosis or edema    Neurologic:   Mental status: Alert, oriented, thought content appropriate, no dysarthria no aphasia. Her speech and language are fluent and spontaneous. She was able to follow full commands and cross body commands. CN Exam: Symmetrical, JENNIFER, EOM's I, no nystagmus, VF full, Gaze conjugate No sensory or motor lateralizations (No PP on face), Hearing I B, CNIX-XII I B  Motor: full power, age appropriate x 4 limbs  Sensory: intact  X 4 limbs, mod inc lt, temp, & PP tested   Cerebellar: no past pointing or drift from modified Romberg position, no ataxia w maneuvers. DTR's: 1+;  Plantars: downgoing B  Gait: Fluid smooth, no LOB w cadance change. Lab Results:   I have personally reviewed pertinent reports. , CBC:   Results from last 7 days   Lab Units 10/07/23  0603 10/06/23  1147   WBC Thousand/uL 5.69 7.52   RBC Million/uL 4.35 4.62   HEMOGLOBIN g/dL 12.4 13.0   HEMATOCRIT % 37.8 40.4   MCV fL 87 87   PLATELETS Thousands/uL 159 161        Imaging Studies: I have personally reviewed pertinent films in PACS and Have reviewed both her CT as well as her MRI just done. The formal report of the MRI is not up: I see no acute ischemia on her diffusion-weighted images. Her flares do have the evidence of the advanced small vessel disease. [place] images do not have any unusual pathology either appreciated on my review  CTA: Age-indeterminate lacunar infarct in right thalamocapsular junction, new since 11/7/2022. This could be subacute or chronic in age.   Severe chronic microangiopathy.      Negative CTA head and neck for large vessel occlusion, dissection, aneurysm, or high-grade stenosis.    Additional chronic/incidental findings: No acute osseous abnormality. Chronic T6 anterior wedge compression fracture deformity with moderate height loss. Chronic T7 anterior wedge compression fracture deformity with severe height loss. Moderate-to-severe multilevel degenerative changes of cervical spine. Diffuse osteopenia. Poststernotomy.     CTA chest abdomen pelvis with and without: No acute intra-abdominal pathology. Mild bibasal interstitial thickening, not evident in 2022. This may include bibasal interstitial edema versus fibrosis. Correlate with clinical findings. Moderate cardiomegaly. Chronic findings, as per the body of the report.       EEG, Echo, Pathology, and Other Studies: She had an echo done in November of last year. She was noted to have a EF 55% ejection fraction. There was no thrombus seen. She has moderately dilated atria bilaterally. Counseling / Coordination of Care  Total time spent today 50 minutes. Greater than 50% of total time was spent with the patient and / or family counseling and / or coordination of care. A description of the counseling / coordination of care: All of the above was discussed and reviewed with the patient with the exception of her MRI which was not available at the time that we were in the room. She had asked several questions I believe they were all answered to her satisfaction at this time. Dictation voice to text software has been used in the creation of this document. Please consider this in light of any contextual or grammatical errors.

## 2023-10-07 NOTE — CONSULTS
Consultation - Cardiology   Azul Madden 80 y.o. female MRN: [id number]  Unit/Bed#: -01 Encounter: [id number]  10/07/23  9:03 AM    Assessment/ Plan:  1. Syncope  • Endorses associated dizziness and emesis  • Telemetry shows rate controlled A-fib without significant events, continue to monitor  • TSH is WNL, orthostatic vitals ordered  • Echo pending  • Consider outpatient event monitor with primary cardiologist at [facility]    2. Subacute CVA  • Noted on CTA head/neck, MRI brain pending  • Management per neurology    3. Elevated troponin  • Denies chest pain or SOB  • EKG without acute ischemic abnormalities   • Troponins mildly elevated with peak of 64, appear to be chronically elevated    4. Chronic atrial fibrillation  • HR is well controlled, continue Toprol-XL  • Continue warfarin for AC    5. Chronic HFpEF  • Appears euvolemic on exam  • , however CXR shows no acute cardiopulmonary disease  • Echo pending, EF previously 55% (11/2022)  • Continue Lasix 20 mg daily  • Strict I/O's and daily weights; recommend sodium and fluid restriction    6. Aortic stenosis s/p TAVR  • Echo ordered    7. Hypertension  • Currently well controlled, continue to monitor  • Continue amlodipine, Toprol-XL, and Lasix    8. Pulmonary hypertension  • Echo ordered, RVSP previously 50.0 mmHg (11/2022)    9. CKD 3  • Creatinine is WNL, continue to monitor          History of Present Illness   Physician Requesting Consult: Srinivasa Ashley MD    Reason for Consult / Principal Problem: Syncope    HPI: Azul Madden is a 80y.o. year old female with history of chronic A-fib on warfarin, chronic HFpEF, aortic stenosis s/p TAVR, hypertension, pulmonary hypertension, and CKD 3 who presents following a brief syncopal episode. Patient was dusting yesterday when she experienced episode of dizziness and emesis followed by syncope witnessed by patient's .  was able to catch patient preventing fall or trauma. LOC was reportedly very brief. CTA head and neck in the ED suggestive of subacute CVA prompting neurology consultation. Cardiology consulted for further evaluation and management of syncope. Patient was previously evaluated by cardiology at [address] [place] [address] for weakness and elevated troponin in November 2022. Patient received cardiac catheterization which was negative for significant obstructive CAD. Denies chest pain, shortness of breath, palpitations or recent fever/chills. Follow[facility]. Inpatient consult to Cardiology  Consult performed by: Marjorie Rosario PA-C  Consult ordered by: NOE Tomas          EKG: Atrial fibrillation; incomplete right bundle branch block      Review of Systems   Constitutional: Negative for chills and fever. HENT: Negative for ear pain and sore throat. Eyes: Negative for pain and visual disturbance. Respiratory: Negative for cough and shortness of breath. Cardiovascular: Negative for chest pain, palpitations and leg swelling. Gastrointestinal: Positive for vomiting. Negative for abdominal pain. Genitourinary: Negative for dysuria and hematuria. Musculoskeletal: Negative for arthralgias and back pain. Skin: Negative for color change and rash. Neurological: Positive for dizziness and syncope. All other systems reviewed and are negative. Historical Information   No past medical history on file. Past Surgical History:   Procedure Laterality Date   • CARDIAC CATHETERIZATION Left 11/15/2022    Procedure: Cardiac catheterization;  Surgeon: Kana Blanco MD;  Location: [address] [place] [address] CATH LAB; Service: Cardiology   • CARDIAC CATHETERIZATION N/A 11/15/2022    Procedure: Cardiac Coronary Angiogram;  Surgeon: Kana Blanco MD;  Location: [address] [place] [address] CATH LAB;   Service: Cardiology   • CARDIAC SURGERY       Social History     Substance and Sexual Activity   Alcohol Use Yes   • Alcohol/week: 3.0 standard drinks of alcohol   • Types: 3 Glasses of wine per week     Social History     Substance and Sexual Activity   Drug Use Never     Social History     Tobacco Use   Smoking Status Never   Smokeless Tobacco Never       Family History: No family history on file. Meds/Allergies   all current active meds have been reviewed  No Known Allergies    Objective   Vitals: Blood pressure 138/72, pulse 81, temperature 97.5 °F (36.4 °C), temperature source Oral, resp. rate 18, height 5' (1.524 m), weight 71.2 kg (156 lb 15.5 oz), SpO2 91 %., Body mass index is 30.66 kg/m².,   Orthostatic Blood Pressures    Flowsheet Row Most Recent Value   Blood Pressure 138/72 filed at 10/07/2023 0700   Patient Position - Orthostatic VS Lying filed at 10/07/2023 1966          Systolic (06GBT), KPB:449 , Min:121 , VTB:469     Diastolic (45EWO), BEAU:55, Min:67, Max:97        Intake/Output Summary (Last 24 hours) at 10/7/2023 0903  Last data filed at 10/7/2023 0620  Gross per 24 hour   Intake --   Output 400 ml   Net -400 ml       Invasive Devices     Peripheral Intravenous Line  Duration           Peripheral IV 10/06/23 Dorsal (posterior); Right Hand <1 day    Peripheral IV 10/06/23 Right Antecubital <1 day                    Physical Exam:  GEN: Alert and oriented x 3, in no acute distress. Well appearing and well nourished. HEENT: Sclera anicteric, conjunctivae pink, mucous membranes moist. Oropharynx clear. NECK: Supple, no carotid bruits, no significant JVD. Trachea midline, no thyromegaly. HEART: Irregularly irregular rhythm, normal S1 and S2, no murmurs, clicks, gallops or rubs. PMI nondisplaced, no thrills. LUNGS: Clear to auscultation bilaterally; no wheezes, rales, or rhonchi. No increased work of breathing or signs of respiratory distress. ABDOMEN: Soft, nontender, nondistended, normoactive bowel sounds. EXTREMITIES: Skin warm and well perfused, no clubbing, cyanosis, or edema. NEURO: No focal findings. Normal speech.  Mood and affect normal.   SKIN: Normal without suspicious lesions on exposed skin.     Lab Results:     Cardiac Profile:   Results from last 7 days   Lab Units 10/06/23  1829 10/06/23  1358 10/06/23  1147   HS TNI 0HR ng/L  --   --  64*   HS TNI 2HR ng/L  --  57*  --    HSTNI D2 ng/L  --  -7  --    HS TNI 4HR ng/L 55*  --   --    HSTNI D4 ng/L -9  --   --        CBC with diff:   Results from last 7 days   Lab Units 10/07/23  0603 10/06/23  1147   WBC Thousand/uL 5.69 7.52   HEMOGLOBIN g/dL 12.4 13.0   HEMATOCRIT % 37.8 40.4   MCV fL 87 87   PLATELETS Thousands/uL 159 161   RBC Million/uL 4.35 4.62   MCH pg 28.5 28.1   MCHC g/dL 32.8 32.2   RDW % 14.0 14.1   MPV fL 10.0 10.0   NRBC AUTO /100 WBCs  --  0         CMP:   Results from last 7 days   Lab Units 10/06/23  1147   POTASSIUM mmol/L 4.0   CHLORIDE mmol/L 102   CO2 mmol/L 31   BUN mg/dL 23   CREATININE mg/dL 1.30   CALCIUM mg/dL 9.5   AST U/L 32   ALT U/L 16   ALK PHOS U/L 55   EGFR ml/min/1.73sq m 38

## 2023-10-08 ENCOUNTER — APPOINTMENT (INPATIENT)
Dept: NON INVASIVE DIAGNOSTICS | Facility: HOSPITAL | Age: 83
End: 2023-10-08
Payer: MEDICARE

## 2023-10-08 VITALS
WEIGHT: 156 LBS | BODY MASS INDEX: 30.63 KG/M2 | HEART RATE: 77 BPM | RESPIRATION RATE: 12 BRPM | HEIGHT: 60 IN | OXYGEN SATURATION: 93 % | SYSTOLIC BLOOD PRESSURE: 146 MMHG | DIASTOLIC BLOOD PRESSURE: 85 MMHG | TEMPERATURE: 97.7 F

## 2023-10-08 LAB
AORTIC ROOT: 2.5 CM
APICAL FOUR CHAMBER EJECTION FRACTION: 65 %
ASCENDING AORTA: 2.8 CM
AV LVOT MEAN GRADIENT: 1 MMHG
AV LVOT PEAK GRADIENT: 3 MMHG
CHOLEST SERPL-MCNC: 126 MG/DL
DOP CALC LVOT AREA: 2.54 CM2
DOP CALC LVOT CARDIAC INDEX: 2.01 L/MIN/M2
DOP CALC LVOT CARDIAC OUTPUT: 3.38 L/MIN
DOP CALC LVOT DIAMETER: 1.8 CM
DOP CALC LVOT PEAK VEL VTI: 15.37 CM
DOP CALC LVOT PEAK VEL: 0.83 M/S
DOP CALC LVOT STROKE INDEX: 22.6 ML/M2
DOP CALC LVOT STROKE VOLUME: 39.09
E WAVE DECELERATION TIME: 160 MS
E/A RATIO: 1.08
EST. AVERAGE GLUCOSE BLD GHB EST-MCNC: 123 MG/DL
FRACTIONAL SHORTENING: 33 (ref 28–44)
HBA1C MFR BLD: 5.9 %
HDLC SERPL-MCNC: 44 MG/DL
INTERVENTRICULAR SEPTUM IN DIASTOLE (PARASTERNAL SHORT AXIS VIEW): 1.1 CM
INTERVENTRICULAR SEPTUM: 1.1 CM (ref 0.6–1.1)
LAAS-AP2: 26.6 CM2
LAAS-AP4: 26.9 CM2
LDLC SERPL CALC-MCNC: 61 MG/DL (ref 0–100)
LEFT ATRIUM SIZE: 4.6 CM
LEFT ATRIUM VOLUME (MOD BIPLANE): 89 ML
LEFT ATRIUM VOLUME INDEX (MOD BIPLANE): 53 ML/M2
LEFT INTERNAL DIMENSION IN SYSTOLE: 3 CM (ref 2.1–4)
LEFT VENTRICLE DIASTOLIC VOLUME (MOD BIPLANE): 44 ML
LEFT VENTRICLE SYSTOLIC VOLUME (MOD BIPLANE): 17 ML
LEFT VENTRICULAR INTERNAL DIMENSION IN DIASTOLE: 4.5 CM (ref 3.5–6)
LEFT VENTRICULAR POSTERIOR WALL IN END DIASTOLE: 1.1 CM
LEFT VENTRICULAR STROKE VOLUME: 57 ML
LV EF: 61 %
LVSV (TEICH): 57 ML
MV E'TISSUE VEL-SEP: 9 CM/S
MV PEAK A VEL: 0.88 M/S
MV PEAK E VEL: 95 CM/S
MV STENOSIS PRESSURE HALF TIME: 46 MS
MV VALVE AREA P 1/2 METHOD: 4.78
RIGHT ATRIUM AREA SYSTOLE A4C: 28.9 CM2
RIGHT VENTRICLE ID DIMENSION: 4.6 CM
SL CV LEFT ATRIUM LENGTH A2C: 6.5 CM
SL CV LV EF: 55
SL CV PED ECHO LEFT VENTRICLE DIASTOLIC VOLUME (MOD BIPLANE) 2D: 92 ML
SL CV PED ECHO LEFT VENTRICLE SYSTOLIC VOLUME (MOD BIPLANE) 2D: 35 ML
TR MAX PG: 55 MMHG
TR PEAK VELOCITY: 3.7 M/S
TRICUSPID ANNULAR PLANE SYSTOLIC EXCURSION: 1.6 CM
TRICUSPID VALVE PEAK REGURGITATION VELOCITY: 3.72 M/S
TRIGL SERPL-MCNC: 104 MG/DL

## 2023-10-08 PROCEDURE — 93306 TTE W/DOPPLER COMPLETE: CPT

## 2023-10-08 PROCEDURE — 99239 HOSP IP/OBS DSCHRG MGMT >30: CPT | Performed by: PHYSICIAN ASSISTANT

## 2023-10-08 PROCEDURE — 93306 TTE W/DOPPLER COMPLETE: CPT | Performed by: INTERNAL MEDICINE

## 2023-10-08 PROCEDURE — 83036 HEMOGLOBIN GLYCOSYLATED A1C: CPT | Performed by: PHYSICIAN ASSISTANT

## 2023-10-08 PROCEDURE — 99232 SBSQ HOSP IP/OBS MODERATE 35: CPT | Performed by: INTERNAL MEDICINE

## 2023-10-08 PROCEDURE — 99232 SBSQ HOSP IP/OBS MODERATE 35: CPT | Performed by: PSYCHIATRY & NEUROLOGY

## 2023-10-08 PROCEDURE — 80061 LIPID PANEL: CPT | Performed by: PHYSICIAN ASSISTANT

## 2023-10-08 RX ORDER — MECLIZINE HCL 12.5 MG/1
12.5 TABLET ORAL EVERY 8 HOURS PRN
Qty: 30 TABLET | Refills: 0 | Status: SHIPPED | OUTPATIENT
Start: 2023-10-08

## 2023-10-08 RX ADMIN — METOPROLOL SUCCINATE 25 MG: 25 TABLET, EXTENDED RELEASE ORAL at 08:48

## 2023-10-08 RX ADMIN — ASPIRIN 81 MG: 81 TABLET, CHEWABLE ORAL at 08:48

## 2023-10-08 RX ADMIN — FUROSEMIDE 20 MG: 20 TABLET ORAL at 08:48

## 2023-10-08 NOTE — DISCHARGE SUMMARY
[address]  Discharge- Malinda Hernandez 1940, 80 y.o. female MRN: [id number]  Unit/Bed#: -01 Encounter: [id number]  Primary Care Provider: No primary care provider on file. Date and time admitted to hospital: 10/6/2023 11:05 AM    * Syncope  Assessment & Plan  Presents with dizziness, shortness of breath on exertion, syncope with fall, episode of emesis. No trauma, no postictal state    · CTA Head and Neck: Age-indeterminate lacunar infarct in right thalamocapsular junction, new since 11/7/2022. This could be subacute or chronic in age. Negative  for large vessel occlusion, dissection, aneurysm, or high-grade stenosis. · CT Abdomen: Mild bibasal interstitial thickening, not evident in 2022. This may include bibasal interstitial edema versus fibrosis. Correlate with clinical findings.  Moderate cardiomegaly  · CXR: Negative for acute cardiopulmonary disease  · MRI brain: No acute infarct; old; small old lacunar infarct within the right thalamus and right caudate body  · , HsTnI flat 64-57-55  · CBC,CMP wnl  · Neurology and cardiology consults appreciated   · No acute stroke; recommend aspirin, statin given findings, however   · Pending echo, discharge home    Atrial fibrillation (HCC)  Assessment & Plan  · Rate controlled on metoprolol  · Coumadin for anticoagulation, daily INR goal 2-3  · Monitor INR; currently 2.54    Chronic diastolic heart failure ([address])  Assessment & Plan  Appears in exacerbation evident by hypertension, peripheral edema, SOB with activity, interstitial pulmonary edema on CT, elevated BNP  • Continue home metoprolol, d/c amlodipine [no longer taking]  • Given exacerbation, will order 2.5x home dose; 60mg AM and 40mg PM for total 100 mg daily   • Daily standing weights  • Monitor I/Os, serial BMP  • Last echo 11/22 LVEF 55%  • s/p TAVR 12/21      Stage 3a chronic kidney disease ([address])  Assessment & Plan  · CKD 3a/b, difficult to ascertain accurate baseline given lack of labs  · Last Cr 1.4 from 6/23  · Appears to be stable    Hypertension  Assessment & Plan  · Chronic; elevated on admission, possibly secondary to fluid overload  · Continue metoprolol  BP is better, Blood Pressure: 127/87  · Follow up on orthostatic vitals     Medical Problems     Resolved Problems  Date Reviewed: 10/8/2023   None       Discharging Physician / Practitioner: Stormy Muller PA-C  PCP: No primary care provider on file. Admission Date:   Admission Orders (From admission, onward)     Ordered        10/07/23 1555  Inpatient Admission  Once            10/06/23 1443  Place in Observation  Once                      Discharge Date: 10/08/23    Consultations During Hospital Stay:  1501 \[facility] [place] [facility]\""  IP CONSULT TO CARDIOLOGY  IP CONSULT TO NEUROLOGY  IP CONSULT TO CASE MANAGEMENT  · IP CONSULT TO NUTRITION SERVICES     Procedures Performed:   · None     Significant Findings / Test Results:     CTA dissection protocol chest abdomen pelvis w wo contrast 10/6/2023:  No acute intra-abdominal pathology. Mild bibasal interstitial thickening, not evident in 2022. This may include bibasal interstitial edema versus fibrosis. Correlate with clinical findings. Moderate cardiomegaly. Chronic findings, as per the body of the report. CTA head and neck with and without contrast 10/6/2023:  Age-indeterminate lacunar infarct in right thalamocapsular junction, new since 11/7/2022. This could be subacute or chronic in age. Recommend MRI brain without contrast for confirmation. Severe chronic microangiopathy. Negative CTA head and neck for large vessel occlusion, dissection, aneurysm, or high-grade stenosis. Additional chronic/incidental findings as detailed above. MRI brain 10/7/2023: Moderate to advanced diffuse chronic microangiopathic change within the brain parenchyma with no acute ischemia.        Incidental Findings:   · Evidence of prior CVA    · I reviewed the above mentioned incidental findings with the patient and/or family and they expressed understanding. Test Results Pending at Discharge (will require follow up):   · ECHO REPORT PENDING AT TIME OF DICTATION      Outpatient Tests Requested:  · None     Complications:  None     Reason for Admission: dizziness, rule out CVA vs cardiac dysrhythmia     Hospital Course:   Abbi Ricardo is a 80 y.o. female patient who originally presented to the hospital on 10/6/2023 due to nausea and dizziness with syncope. Patient does have a past medical history significant for A-fib, TAVR, CKD stage III, CHF, CAD. Initial work-up in the ER showed a possible subacute to chronic stroke, thus patient was seen by neurology. Follow-up MRI showed chronic stroke with microvascular disease. They are recommending aspirin and statin on discharge. Can follow-up outpatient. Cardiology evaluated the patient due to suspected cardiac nature to syncope. Work-up largely unremarkable     Please see above list of diagnoses and related plan for additional information. Condition at Discharge: fair    Discharge Day Visit / Exam:   Subjective: Patient seen this morning out of bed to the chair. Reports feeling well. No further episodes of dizziness or nausea. No headache. She is really hoping to go home today. Discussed her MRI findings, which were reviewed with her yesterday by neurology as well. Vitals: Blood Pressure: 127/87 (10/08/23 0700)  Pulse: 75 (10/08/23 0700)  Temperature: 97.7 °F (36.5 °C) (10/08/23 0700)  Temp Source: Oral (10/08/23 0700)  Respirations: 12 (10/08/23 0700)  Height: 5' (152.4 cm) (10/06/23 1706)  Weight - Scale: 70.9 kg (156 lb 4.9 oz) (10/08/23 0543)  SpO2: 93 % (10/08/23 0900)  Exam:   Physical Exam  Vitals and nursing note reviewed. Constitutional:       General: She is awake. She is not in acute distress. Appearance: Normal appearance. She is well-developed, well-groomed and overweight.  She is not ill-appearing or toxic-appearing. HENT:      Head: Normocephalic and atraumatic. Cardiovascular:      Rate and Rhythm: Normal rate and regular rhythm. Comments: Telemetry reviewed  Pulmonary:      Effort: Pulmonary effort is normal. No respiratory distress. Breath sounds: No wheezing. Musculoskeletal:      Right lower leg: No edema. Left lower leg: No edema. Skin:     Coloration: Skin is not pale. Neurological:      Mental Status: She is alert and oriented to person, place, and time. Psychiatric:         Mood and Affect: Mood normal.         Behavior: Behavior normal. Behavior is cooperative. Discussion with Family: Updated  () at bedside. Discharge instructions/Information to patient and family:   See after visit summary for information provided to patient and family. Provisions for Follow-Up Care:  See after visit summary for information related to follow-up care and any pertinent home health orders. Disposition:   Home    Planned Readmission: no     Discharge Statement:  I spent 55 minutes discharging the patient. This time was spent on the day of discharge. I had direct contact with the patient on the day of discharge. Greater than 50% of the total time was spent examining patient, answering all patient questions, arranging and discussing plan of care with patient as well as directly providing post-discharge instructions. Additional time then spent on discharge activities. Discharge Medications:  See after visit summary for reconciled discharge medications provided to patient and/or family.       **Please Note: This note may have been constructed using a voice recognition system**

## 2023-10-08 NOTE — ASSESSMENT & PLAN NOTE
Presents with dizziness, shortness of breath on exertion, syncope with fall, episode of emesis. No trauma, no postictal state    · CTA Head and Neck: Age-indeterminate lacunar infarct in right thalamocapsular junction, new since 11/7/2022. This could be subacute or chronic in age. Negative  for large vessel occlusion, dissection, aneurysm, or high-grade stenosis. · CT Abdomen: Mild bibasal interstitial thickening, not evident in 2022. This may include bibasal interstitial edema versus fibrosis. Correlate with clinical findings.  Moderate cardiomegaly  · CXR: Negative for acute cardiopulmonary disease  · MRI brain: No acute infarct; old; small old lacunar infarct within the right thalamus and right caudate body  · , HsTnI flat 64-57-55  · CBC,CMP wnl  · Neurology and cardiology consults appreciated   · No acute stroke; recommend aspirin, statin given findings, however   · Pending echo, discharge home

## 2023-10-08 NOTE — ASSESSMENT & PLAN NOTE
· CKD 3a/b, difficult to ascertain accurate baseline given lack of labs  · Last Cr 1.4 from 6/23  · Appears to be stable

## 2023-10-08 NOTE — PROGRESS NOTES
Progress Note - Neurology   Gaby Blakely 80 y.o. female MRN: [id number]  Unit/Bed#: -01 Encounter: [id number]      Subjective:   Patient seen in follow-up for acute onset of dizziness and suspected syncope, with no recurrent symptoms since admission. Initially reported symptoms started off with vertigo followed by nausea vomiting during which time she also had a brief syncopal event. No obvious symptoms to suspect seizures, her initial imaging was unremarkable and MRI of the brain subsequently done showed evidence of diffuse chronic white matter changes as well as chronic microangiopathy. MRI results discussed with patient. She denies any recurrent symptoms and is comfortably sitting out of bed. She describes mild discomfort in the lower left rib cage this morning and otherwise ambulatory. ROS: 12 system cued query was unchanged from org. consult note. Vitals:   Vitals:    10/08/23 0700   BP: 127/87   Pulse: 75   Resp: 12   Temp: 97.7 °F (36.5 °C)   SpO2: 94%   ,Body mass index is 30.53 kg/m².     MEDS:    Current Facility-Administered Medications:   •  acetaminophen (TYLENOL) tablet 650 mg, 650 mg, Oral, Q4H PRN, NOE Tomas, 650 mg at 10/06/23 2342  •  amLODIPine (NORVASC) tablet 5 mg, 5 mg, Oral, Daily, NOE Tomas  •  aspirin chewable tablet 81 mg, 81 mg, Oral, Daily, NOE Cortez, 81 mg at 10/08/23 0848  •  atorvastatin (LIPITOR) tablet 40 mg, 40 mg, Oral, QPM, Mary Butler PA-C, 40 mg at 10/07/23 1731  •  calcium carbonate (TUMS) chewable tablet 1,000 mg, 1,000 mg, Oral, Daily PRN, NOE Tomas  •  furosemide (LASIX) tablet 20 mg, 20 mg, Oral, Daily, Mary Butler PA-C, 20 mg at 10/08/23 0848  •  meclizine (ANTIVERT) tablet 12.5 mg, 12.5 mg, Oral, Q8H PRN, NOE Tomas  •  metoprolol succinate (TOPROL-XL) 24 hr tablet 25 mg, 25 mg, Oral, Daily, NOE oTmas, 25 mg at 10/08/23 0848  • ondansetron (ZOFRAN) injection 4 mg, 4 mg, Intravenous, Q6H PRN, NOE Tomas  •  polyethylene glycol (MIRALAX) packet 17 g, 17 g, Oral, Daily PRN, NOE Tomas  •  warfarin (COUMADIN) tablet 1 mg, 1 mg, Oral, Daily (warfarin), NOE Tomas, 1 mg at 10/07/23 8571    Physical Exam:  General appearance: alert, appears stated age and cooperative  Head: Normocephalic, without obvious abnormality, atraumatic    Neurologic:  On neurological exam patient is alert awake oriented, speech is fluent, cranial nerves II to XII are intact, with no evidence of any eye movement abnormalities, motor and sensory exam reveals no evidence of any focal weakness or sensory loss. DTRs are 1+, plantars are downgoing and her gait was normal based. Lab Results: I have personally reviewed pertinent reports. Imaging Studies: I have personally reviewed pertinent reports. Assessment:  1. Syncope most likely vasovagal, preceded by vertigo nausea vomiting. 2. Chronic microangiopathy changes noted on MRI with a history of atrial fibrillation and intracranial atherosclerotic disease. Plan:  Patient admitted for syncope, MRI of the brain was reviewed with the patient, will recommend in addition to Coumadin also take aspirin 81 mg and patient is on statin therapy. Patient counseled regarding blood pressure and blood sugar control, and has not had any recurrent episodes of vertigo which seem peripheral, may try meclizine on a as needed basis if she has any recurrent episodes. Patient okay for discharge from neuro standpoint and is advised to follow-up with her PCP. Counseling / Coordination of Care  Total time spent today 25 minutes. Greater than 50% of total time was spent with the patient and / or family counseling and / or coordination of care. 10/8/2023,9:57 AM    Dictation voice to text software has been used in the creation of this document.  Please consider this in light of any contextual or grammatical errors.

## 2023-10-08 NOTE — PLAN OF CARE
Problem: PAIN - ADULT  Goal: Verbalizes/displays adequate comfort level or baseline comfort level  Description: Interventions:  - Encourage patient to monitor pain and request assistance  - Assess pain using appropriate pain scale  - Administer analgesics based on type and severity of pain and evaluate response  - Implement non-pharmacological measures as appropriate and evaluate response  - Consider cultural and social influences on pain and pain management  - Notify physician/advanced practitioner if interventions unsuccessful or patient reports new pain  Outcome: Progressing     Problem: INFECTION - ADULT  Goal: Absence or prevention of progression during hospitalization  Description: INTERVENTIONS:  - Assess and monitor for signs and symptoms of infection  - Monitor lab/diagnostic results  - Monitor all insertion sites, i.e. indwelling lines, tubes, and drains  - Monitor endotracheal if appropriate and nasal secretions for changes in amount and color  - [place] appropriate cooling/warming therapies per order  - Administer medications as ordered  - Instruct and encourage patient and family to use good hand hygiene technique  - Identify and instruct in appropriate isolation precautions for identified infection/condition  Outcome: Progressing  Goal: Absence of fever/infection during neutropenic period  Description: INTERVENTIONS:  - Monitor WBC    Outcome: Progressing     Problem: SAFETY ADULT  Goal: Patient will remain free of falls  Description: INTERVENTIONS:  - Educate patient/family on patient safety including physical limitations  - Instruct patient to call for assistance with activity   - Consult OT/PT to assist with strengthening/mobility   - Keep Call bell within reach  - Keep bed low and locked with side rails adjusted as appropriate  - Keep care items and personal belongings within reach  - Initiate and maintain comfort rounds  - Make Fall Risk Sign visible to staff  - Offer Toileting every  Hours, in advance of need  - Initiate/Maintain alarm  - Obtain necessary fall risk management equipment:   - Apply yellow socks and bracelet for high fall risk patients  - Consider moving patient to room near nurses station  Outcome: Progressing  Goal: Maintain or return to baseline ADL function  Description: INTERVENTIONS:  -  Assess patient's ability to carry out ADLs; assess patient's baseline for ADL function and identify physical deficits which impact ability to perform ADLs (bathing, care of mouth/teeth, toileting, grooming, dressing, etc.)  - Assess/evaluate cause of self-care deficits   - Assess range of motion  - Assess patient's mobility; develop plan if impaired  - Assess patient's need for assistive devices and provide as appropriate  - Encourage maximum independence but intervene and supervise when necessary  - Involve family in performance of ADLs  - Assess for home care needs following discharge   - Consider OT consult to assist with ADL evaluation and planning for discharge  - Provide patient education as appropriate  Outcome: Progressing  Goal: Maintains/Returns to pre admission functional level  Description: INTERVENTIONS:  - Perform BMAT or MOVE assessment daily.   - Set and communicate daily mobility goal to care team and patient/family/caregiver. - Collaborate with rehabilitation services on mobility goals if consulted  - Perform Range of Motion  times a day. - Reposition patient every  hours.   - Dangle patient  times a day  - Stand patient  times a day  - Ambulate patient  times a day  - Out of bed to chair times a day   - Out of bed for meals  times a day  - Out of bed for toileting  - Record patient progress and toleration of activity level   Outcome: Progressing     Problem: DISCHARGE PLANNING  Goal: Discharge to home or other facility with appropriate resources  Description: INTERVENTIONS:  - Identify barriers to discharge w/patient and caregiver  - Arrange for needed discharge resources and transportation as appropriate  - Identify discharge learning needs (meds, wound care, etc.)  - Arrange for interpretive services to assist at discharge as needed  - Refer to Case Management Department for coordinating discharge planning if the patient needs post-hospital services based on physician/advanced practitioner order or complex needs related to functional status, cognitive ability, or social support system  Outcome: Progressing

## 2023-10-08 NOTE — ASSESSMENT & PLAN NOTE
· Chronic; elevated on admission, possibly secondary to fluid overload  · Continue metoprolol  BP is better, Blood Pressure: 127/87  · Follow up on orthostatic vitals

## 2023-10-08 NOTE — SPEECH THERAPY NOTE
Consult received and chart reviewed. Per chart review, and discussion with RN, pt passed RN dysphagia assessment and is tolerating regular diet with thin liquids with no s/s of aspiration. MRI negative for acute ischemia. No dysarthria or language deficits noted or reported. Therefore, formal speech/swallow evaluation not indicated at this time. If new concerns arise, please reconsult.      Jarocho Melgar M.S., [address] [facility]  Speech Language Pathologist   Available via [address] [place] [address] #[id number]  [place] #[id number]

## 2023-10-08 NOTE — CASE MANAGEMENT
Case Management Assessment & Discharge Planning Note    Patient name Vinny Nuñez  Location [address] [facility] [address] 211/-01 MRN [id number]  : 1940 Date 10/8/2023       Current Admission Date: 10/6/2023  Current Admission Diagnosis:Syncope   Patient Active Problem List    Diagnosis Date Noted   • Syncope 10/06/2023   • H/O aortic valve replacement 2022   • Atrial fibrillation ([address]) 2022   • Hypertension 2022   • Weakness 2022   • Chronic diastolic heart failure ([address]) 2022   • Elevated troponin 2022   • Stage 3a chronic kidney disease ([address]) 2022      LOS (days): 1  Geometric Mean LOS (GMLOS) (days):   Days to GMLOS:     OBJECTIVE:    Risk of Unplanned Readmission Score: 11.34         Current admission status: Inpatient       Preferred Pharmacy:   [address] [place] [address]  Phone: [phone] Fax: [phone]    Primary Care Provider: No primary care provider on file. Primary Insurance: MEDICARE  Secondary Insurance: [address] [place] [address]    ASSESSMENT:  Active Health Care Proxies    There are no active Health Care Proxies on file. Advance Directives  Does patient have a [address]?: Yes  Does patient have Advance Directives?: Yes  Advance Directives: Living will, Power of  for health care  Primary Contact:  Vega Shelby         Readmission Root Cause  30 Day Readmission: No    Patient Information  Admitted from[de-identified] Home  Mental Status: Alert  During Assessment patient was accompanied by: Not accompanied during assessment  Assessment information provided by[de-identified] Patient  Primary Caregiver: Self  Support Systems: Spouse/significant other  [place] of Residence: [address] [place] [address] do you live in?: [facility] entry access options.  Select all that apply.: Ramp  Type of Current Residence: 2 story home  Upon entering residence, is there a bedroom on the main floor (no further steps)?: Yes  Upon entering residence, is there a bathroom on the main floor (no further steps)?: Yes  In the last 12 months, was there a time when you were not able to pay the mortgage or rent on time?: No  In the last 12 months, how many places have you lived?: 1  In the last 12 months, was there a time when you did not have a steady place to sleep or slept in a shelter (including now)?: No  Homeless/housing insecurity resource given?: No  Living Arrangements: Lives w/ Spouse/significant other  Is patient a ?: No    Activities of Daily Living Prior to Admission  Functional Status: Independent  Completes ADLs independently?: Yes  Does patient use assisted devices?: No  Does patient currently own DME?: Yes  What DME does the patient currently own?: Neyda Reeves  Does patient have a history of Outpatient Therapy (PT/OT)?: No  Does the patient have a history of Short-Term Rehab?: Yes  Does patient have a history of HHC?: Yes  Does patient currently have [address] [facility]?: No         Patient Information Continued  Income Source: Pension/correction  Does patient have prescription coverage?: Yes  Within the past 12 months, you worried that your food would run out before you got the money to buy more.: Never true  Within the past 12 months, the food you bought just didn't last and you didn't have money to get more.: Never true  Food insecurity resource given?: No  Does patient receive dialysis treatments?: No  Does patient have a history of substance abuse?: No  Does patient have a history of Mental Health Diagnosis?: No    PHQ 2/9 Screening   Reviewed PHQ 2/9 Depression Screening Score?: No    Means of Transportation  Means of Transport to Appts[de-identified] Drives Self  In the past 12 months, has lack of transportation kept you from medical appointments or from getting medications?: No  In the past 12 months, has lack of transportation kept you from meetings, work, or from getting things needed for daily living?: No  Was application for public transport provided?: No        DISCHARGE DETAILS:    Discharge planning discussed with[de-identified] patient  Freedom of Choice: Yes  Comments - Freedom of Choice: Pt does not feel that she will need VNA services on discharge. IPTA. Very active  CM contacted family/caregiver?: No- see comments (pt will update her  herself)  Were Treatment Team discharge recommendations reviewed with patient/caregiver?: Yes  Did patient/caregiver verbalize understanding of patient care needs?: Yes  Were patient/caregiver advised of the risks associated with not following Treatment Team discharge recommendations?: Yes    Contacts  Patient Contacts:  Dian Tai  Relationship to Patient[de-identified] [facility]          Is the patient interested in [address] at discharge?: No    DME Referral Provided  Referral made for DME?: No    Other Referral/Resources/Interventions Provided:  Referral Comments: No anticipated d/c needs    Would you like to participate in our [address] service program?  : No - Declined    Treatment Team Recommendation: Home  Discharge Destination Plan[de-identified] Home  Transport at Discharge : Family

## 2023-10-08 NOTE — ASSESSMENT & PLAN NOTE
Appears in exacerbation evident by hypertension, peripheral edema, SOB with activity, interstitial pulmonary edema on CT, elevated BNP  • Continue home metoprolol, d/c amlodipine [no longer taking]  • Given exacerbation, will order 2.5x home dose; 60mg AM and 40mg PM for total 100 mg daily   • Daily standing weights  • Monitor I/Os, serial BMP  • Last echo 11/22 LVEF 55%  • s/p TAVR 12/21

## 2023-10-08 NOTE — PROGRESS NOTES
Cardiology Progress Note - Joseph Hope 80 y.o. female MRN: [id number]    Unit/Bed#: -01 Encounter: [id number]      Assessment/Plan:  1. Syncope  • Endorses associated dizziness and emesis  • Telemetry shows rate controlled A-fib with no significant events, continue to monitor  • TSH is WNL  • Echo shows EF 55%, mildly dilated RV, moderate biatrial dilation, TAVR valve, mild to moderate MR, moderate to severe TR, RVSP 55 mmHg, mild WV  • Consider outpatient event monitor with primary cardiologist at [facility]     2. Subacute CVA  • Management per neurology     3.  Elevated troponin  • Denies chest pain or SOB  • EKG without acute ischemic abnormalities   • Troponins mildly elevated with peak of 64, appear to be chronically elevated     4. Chronic atrial fibrillation  • HR is well controlled, continue Toprol-XL  • Continue warfarin for [facility]     5. Chronic HFpEF  • Euvolemic on exam  • , however CXR shows no acute cardiopulmonary disease  • Continue Lasix 20 mg daily  • Strict I/O's and daily weights; recommend sodium and fluid restriction     6. Aortic stenosis s/p TAVR  • Recommend periodic outpatient surveillance     7. Hypertension  • Currently well controlled, continue to monitor  • Continue amlodipine, Toprol-XL, and Lasix     8.  Pulmonary hypertension  • RVSP 55 mmHg     9. CKD 3  • Creatinine is WNL, continue to monitor      Patient is stable for discharge from a cardiac standpoint on current medications. Recommend close follow-up with primary cardiologist at [facility] for consideration of outpatient event monitor. Thank for this consultation. Subjective:   Patient seen and examined. No significant events overnight. Patient reports he feels well overall today and denies any new complaints at this time. All questions were answered. Objective:     Vitals: Blood pressure 146/85, pulse 77, temperature 97.7 °F (36.5 °C), temperature source Oral, resp.  rate 12, height 5' (1.524 m), weight 70.8 kg (156 lb), SpO2 93 %. , Body mass index is 30.47 kg/m².,   Orthostatic Blood Pressures    Flowsheet Row Most Recent Value   Blood Pressure 146/85 filed at 10/08/2023 1344   Patient Position - Orthostatic VS Lying filed at 10/08/2023 1100            Intake/Output Summary (Last 24 hours) at 10/8/2023 1810  Last data filed at 10/8/2023 0701  Gross per 24 hour   Intake --   Output 600 ml   Net -600 ml         Physical Exam:  GEN: Alert and oriented x 3, in no acute distress. Well appearing and well nourished. HEENT: Sclera anicteric, conjunctivae pink, mucous membranes moist. Oropharynx clear. NECK: Supple, no carotid bruits, no significant JVD. Trachea midline, no thyromegaly. HEART: Irregularly irregular rhythm, normal S1 and S2, no murmurs, clicks, gallops or rubs. PMI nondisplaced, no thrills. LUNGS: Clear to auscultation bilaterally; no wheezes, rales, or rhonchi. No increased work of breathing or signs of respiratory distress. ABDOMEN: Soft, nontender, nondistended, normoactive bowel sounds. EXTREMITIES: Skin warm and well perfused, no clubbing, cyanosis, or edema. NEURO: No focal findings. Normal speech. Mood and affect normal.   SKIN: Normal without suspicious lesions on exposed skin. Medications:    No current facility-administered medications for this encounter.     Current Outpatient Medications:   •  furosemide (LASIX) 20 mg tablet, Take 20 mg by mouth 2 (two) times a day, Disp: , Rfl:   •  meclizine (ANTIVERT) 12.5 MG tablet, Take 1 tablet (12.5 mg total) by mouth every 8 (eight) hours as needed for dizziness, Disp: 30 tablet, Rfl: 0  •  metoprolol succinate (TOPROL-XL) 25 mg 24 hr tablet, Take 25 mg by mouth daily, Disp: , Rfl:   •  warfarin (COUMADIN) 1 mg tablet, Take 1 mg by mouth daily, Disp: , Rfl:   •  aspirin (ECOTRIN LOW STRENGTH) 81 mg EC tablet, Take 1 tablet (81 mg total) by mouth daily Do not start before November 17, 2022., Disp: 60 tablet, Rfl: 0     Labs & Results:     Results from last 7 days   Lab Units 10/06/23  1829 10/06/23  1358 10/06/23  1147   HS TNI 0HR ng/L  --   --  64*   HS TNI 2HR ng/L  --  57*  --    HSTNI D2 ng/L  --  -7  --    HS TNI 4HR ng/L 55*  --   --    HSTNI D4 ng/L -9  --   --      Results from last 7 days   Lab Units 10/07/23  0603 10/06/23  1147   WBC Thousand/uL 5.69 7.52   HEMOGLOBIN g/dL 12.4 13.0   HEMATOCRIT % 37.8 40.4   PLATELETS Thousands/uL 159 161     Results from last 7 days   Lab Units 10/08/23  0514 10/07/23  0603   TRIGLYCERIDES mg/dL 104 103   HDL mg/dL 44* 43*     Results from last 7 days   Lab Units 10/06/23  1147   POTASSIUM mmol/L 4.0   CHLORIDE mmol/L 102   CO2 mmol/L 31   BUN mg/dL 23   CREATININE mg/dL 1.30   CALCIUM mg/dL 9.5   ALK PHOS U/L 55   ALT U/L 16   AST U/L 32     Results from last 7 days   Lab Units 10/07/23  0603 10/06/23  1829   INR  2.54* 2.32*   PTT seconds 46*  --            Vitals: Blood pressure 146/85, pulse 77, temperature 97.7 °F (36.5 °C), temperature source Oral, resp. rate 12, height 5' (1.524 m), weight 70.8 kg (156 lb), SpO2 93 %. , Body mass index is 30.47 kg/m².,   Orthostatic Blood Pressures    Flowsheet Row Most Recent Value   Blood Pressure 146/85 filed at 10/08/2023 1344   Patient Position - Orthostatic VS Lying filed at 10/08/2023 7332          Systolic (42XZC), WNI:202 , Min:108 , OMERO:028     Diastolic (88BLQ), OJI:87, Min:70, Max:87        Intake/Output Summary (Last 24 hours) at 10/8/2023 1810  Last data filed at 10/8/2023 0701  Gross per 24 hour   Intake --   Output 600 ml   Net -600 ml       Invasive Devices     None                   EKG: Atrial fibrillation; incomplete right bundle branch block    Telemetry:    Telemetry Reviewed: Rate controlled atrial fibrillation    BP Readings from Last 3 Encounters:   10/08/23 146/85   11/16/22 151/89      Wt Readings from Last 3 Encounters:   10/08/23 70.8 kg (156 lb)   10/06/23 68.5 kg (151 lb)   11/16/22 68.8 kg (151 lb 12.2 oz)

## 2023-11-27 ENCOUNTER — TELEPHONE (OUTPATIENT)
Dept: NEUROLOGY | Facility: CLINIC | Age: 83
End: 2023-11-27

## 2023-11-27 NOTE — TELEPHONE ENCOUNTER
Hello,     Can you please advise which Speciality/Team and if patient can be seen by an Resident, AP and or Attending  only? Thank you for your time,     Janey AWAN/ Rafa- 10/08/2023    This patient can be seen nonurgently in our neuro offices in 1 to 2 months.

## 2023-11-29 NOTE — TELEPHONE ENCOUNTER
Called patient and spoke with her regarding HFU. Patient declined to schedule. I did advise her that she can schedule an HFU up to 1 yr from her d/c date, anything after that would be a new patient appt. She verbalized understanding.     Not scheduling HFU at this time       Thank you,     Ruba Maxwell

## 2024-11-25 ENCOUNTER — APPOINTMENT (OUTPATIENT)
Dept: RADIOLOGY | Facility: CLINIC | Age: 84
End: 2024-11-25
Payer: MEDICARE

## 2024-11-25 DIAGNOSIS — R06.02 SHORTNESS OF BREATH: ICD-10-CM

## 2024-11-25 DIAGNOSIS — I48.19 PERSISTENT ATRIAL FIBRILLATION (HCC): ICD-10-CM

## 2024-11-25 PROCEDURE — 71046 X-RAY EXAM CHEST 2 VIEWS: CPT

## (undated) DEVICE — PINNACLE R/O II INTRODUCER SHEATH WITH RADIOPAQUE MARKER: Brand: PINNACLE

## (undated) DEVICE — RADIFOCUS OPTITORQUE ANGIOGRAPHIC CATHETER: Brand: OPTITORQUE

## (undated) DEVICE — CATH DIAG 5FR IMPULSE 100CM FR4

## (undated) DEVICE — CATH DIAG 5FR IMPULSE 100CM FL4.5

## (undated) DEVICE — DGW .035 FC J3MM 260CM TEF: Brand: EMERALD

## (undated) DEVICE — MICROPUNCTURE INTRODUCER SET SILHOUETTE TRANSITIONLESS PUSH-PLUS DESIGN - STIFFENED CANNULA WITH NITINOL WIRE GUIDE: Brand: MICROPUNCTURE

## (undated) DEVICE — GLIDESHEATH SLENDER STAINLESS STEEL KIT: Brand: GLIDESHEATH SLENDER

## (undated) DEVICE — CATH DIAG 5FR IMPULSE 100CM FL3.5

## (undated) DEVICE — CATH DIAG 5FR IMPULSE 100CM FL4

## (undated) DEVICE — TR BAND RADIAL ARTERY COMPRESSION DEVICE: Brand: TR BAND